# Patient Record
Sex: FEMALE | Race: WHITE | NOT HISPANIC OR LATINO | Employment: UNEMPLOYED | URBAN - METROPOLITAN AREA
[De-identification: names, ages, dates, MRNs, and addresses within clinical notes are randomized per-mention and may not be internally consistent; named-entity substitution may affect disease eponyms.]

---

## 2022-03-17 DIAGNOSIS — M25.561 RIGHT KNEE PAIN, UNSPECIFIED CHRONICITY: Primary | ICD-10-CM

## 2022-03-29 ENCOUNTER — HOSPITAL ENCOUNTER (OUTPATIENT)
Dept: RADIOLOGY | Facility: HOSPITAL | Age: 52
Discharge: HOME | End: 2022-03-29
Attending: ORTHOPAEDIC SURGERY
Payer: COMMERCIAL

## 2022-03-29 DIAGNOSIS — M25.561 RIGHT KNEE PAIN, UNSPECIFIED CHRONICITY: ICD-10-CM

## 2022-03-29 PROCEDURE — 73564 XR KNEE COMP 4 OR MORE VIEWS RIGHT: ICD-10-PCS | Mod: 26,RT,, | Performed by: RADIOLOGY

## 2022-03-29 PROCEDURE — 73564 X-RAY EXAM KNEE 4 OR MORE: CPT | Mod: 26,RT,, | Performed by: RADIOLOGY

## 2022-03-29 PROCEDURE — 73564 X-RAY EXAM KNEE 4 OR MORE: CPT | Mod: TC,FY,RT

## 2022-04-05 ENCOUNTER — OFFICE VISIT (OUTPATIENT)
Dept: ORTHOPEDICS | Facility: CLINIC | Age: 52
End: 2022-04-05
Payer: COMMERCIAL

## 2022-04-05 ENCOUNTER — LAB VISIT (OUTPATIENT)
Dept: LAB | Facility: HOSPITAL | Age: 52
End: 2022-04-05
Attending: ORTHOPAEDIC SURGERY
Payer: COMMERCIAL

## 2022-04-05 VITALS
WEIGHT: 168.88 LBS | DIASTOLIC BLOOD PRESSURE: 71 MMHG | BODY MASS INDEX: 24.18 KG/M2 | SYSTOLIC BLOOD PRESSURE: 117 MMHG | HEIGHT: 70 IN | HEART RATE: 77 BPM

## 2022-04-05 DIAGNOSIS — M12.561 TRAUMATIC ARTHRITIS OF RIGHT KNEE: ICD-10-CM

## 2022-04-05 DIAGNOSIS — T84.89XA ACL GRAFT TEAR, INITIAL ENCOUNTER: ICD-10-CM

## 2022-04-05 DIAGNOSIS — T84.89XA ACL GRAFT TEAR, INITIAL ENCOUNTER: Primary | ICD-10-CM

## 2022-04-05 LAB
BASOPHILS # BLD AUTO: 0.04 K/UL (ref 0–0.2)
BASOPHILS NFR BLD: 0.8 % (ref 0–1.9)
CRP SERPL-MCNC: 0.5 MG/L (ref 0–8.2)
DIFFERENTIAL METHOD: ABNORMAL
EOSINOPHIL # BLD AUTO: 0.3 K/UL (ref 0–0.5)
EOSINOPHIL NFR BLD: 5.5 % (ref 0–8)
ERYTHROCYTE [DISTWIDTH] IN BLOOD BY AUTOMATED COUNT: 14.3 % (ref 11.5–14.5)
HCT VFR BLD AUTO: 37.6 % (ref 37–48.5)
HGB BLD-MCNC: 12.6 G/DL (ref 12–16)
IMM GRANULOCYTES # BLD AUTO: 0.01 K/UL (ref 0–0.04)
IMM GRANULOCYTES NFR BLD AUTO: 0.2 % (ref 0–0.5)
LYMPHOCYTES # BLD AUTO: 1.3 K/UL (ref 1–4.8)
LYMPHOCYTES NFR BLD: 25.7 % (ref 18–48)
MCH RBC QN AUTO: 32 PG (ref 27–31)
MCHC RBC AUTO-ENTMCNC: 33.5 G/DL (ref 32–36)
MCV RBC AUTO: 95 FL (ref 82–98)
MONOCYTES # BLD AUTO: 0.5 K/UL (ref 0.3–1)
MONOCYTES NFR BLD: 10 % (ref 4–15)
NEUTROPHILS # BLD AUTO: 2.8 K/UL (ref 1.8–7.7)
NEUTROPHILS NFR BLD: 57.8 % (ref 38–73)
NRBC BLD-RTO: 0 /100 WBC
PLATELET # BLD AUTO: 196 K/UL (ref 150–450)
PMV BLD AUTO: 9.4 FL (ref 9.2–12.9)
RBC # BLD AUTO: 3.94 M/UL (ref 4–5.4)
WBC # BLD AUTO: 4.9 K/UL (ref 3.9–12.7)

## 2022-04-05 PROCEDURE — 3008F PR BODY MASS INDEX (BMI) DOCUMENTED: ICD-10-PCS | Mod: CPTII,S$GLB,, | Performed by: ORTHOPAEDIC SURGERY

## 2022-04-05 PROCEDURE — 1160F RVW MEDS BY RX/DR IN RCRD: CPT | Mod: CPTII,S$GLB,, | Performed by: ORTHOPAEDIC SURGERY

## 2022-04-05 PROCEDURE — 3074F SYST BP LT 130 MM HG: CPT | Mod: CPTII,S$GLB,, | Performed by: ORTHOPAEDIC SURGERY

## 2022-04-05 PROCEDURE — 1160F PR REVIEW ALL MEDS BY PRESCRIBER/CLIN PHARMACIST DOCUMENTED: ICD-10-PCS | Mod: CPTII,S$GLB,, | Performed by: ORTHOPAEDIC SURGERY

## 2022-04-05 PROCEDURE — 1159F MED LIST DOCD IN RCRD: CPT | Mod: CPTII,S$GLB,, | Performed by: ORTHOPAEDIC SURGERY

## 2022-04-05 PROCEDURE — 3078F DIAST BP <80 MM HG: CPT | Mod: CPTII,S$GLB,, | Performed by: ORTHOPAEDIC SURGERY

## 2022-04-05 PROCEDURE — 99999 PR PBB SHADOW E&M-EST. PATIENT-LVL III: ICD-10-PCS | Mod: PBBFAC,,, | Performed by: ORTHOPAEDIC SURGERY

## 2022-04-05 PROCEDURE — 3008F BODY MASS INDEX DOCD: CPT | Mod: CPTII,S$GLB,, | Performed by: ORTHOPAEDIC SURGERY

## 2022-04-05 PROCEDURE — 1159F PR MEDICATION LIST DOCUMENTED IN MEDICAL RECORD: ICD-10-PCS | Mod: CPTII,S$GLB,, | Performed by: ORTHOPAEDIC SURGERY

## 2022-04-05 PROCEDURE — 36415 COLL VENOUS BLD VENIPUNCTURE: CPT | Performed by: ORTHOPAEDIC SURGERY

## 2022-04-05 PROCEDURE — 86140 C-REACTIVE PROTEIN: CPT | Performed by: ORTHOPAEDIC SURGERY

## 2022-04-05 PROCEDURE — 85652 RBC SED RATE AUTOMATED: CPT | Performed by: ORTHOPAEDIC SURGERY

## 2022-04-05 PROCEDURE — 85025 COMPLETE CBC W/AUTO DIFF WBC: CPT | Performed by: ORTHOPAEDIC SURGERY

## 2022-04-05 PROCEDURE — 99999 PR PBB SHADOW E&M-EST. PATIENT-LVL III: CPT | Mod: PBBFAC,,, | Performed by: ORTHOPAEDIC SURGERY

## 2022-04-05 PROCEDURE — 99204 OFFICE O/P NEW MOD 45 MIN: CPT | Mod: S$GLB,,, | Performed by: ORTHOPAEDIC SURGERY

## 2022-04-05 PROCEDURE — 3074F PR MOST RECENT SYSTOLIC BLOOD PRESSURE < 130 MM HG: ICD-10-PCS | Mod: CPTII,S$GLB,, | Performed by: ORTHOPAEDIC SURGERY

## 2022-04-05 PROCEDURE — 99204 PR OFFICE/OUTPT VISIT, NEW, LEVL IV, 45-59 MIN: ICD-10-PCS | Mod: S$GLB,,, | Performed by: ORTHOPAEDIC SURGERY

## 2022-04-05 PROCEDURE — 3078F PR MOST RECENT DIASTOLIC BLOOD PRESSURE < 80 MM HG: ICD-10-PCS | Mod: CPTII,S$GLB,, | Performed by: ORTHOPAEDIC SURGERY

## 2022-04-05 RX ORDER — SERTRALINE HYDROCHLORIDE 50 MG/1
50 TABLET, FILM COATED ORAL DAILY
COMMUNITY
Start: 2022-03-17 | End: 2023-07-06 | Stop reason: SDUPTHER

## 2022-04-05 RX ORDER — MELOXICAM 7.5 MG/1
7.5-15 TABLET ORAL DAILY PRN
COMMUNITY
Start: 2022-02-16 | End: 2022-09-27

## 2022-04-05 RX ORDER — MOMETASONE FUROATE 1 MG/ML
SOLUTION TOPICAL 2 TIMES DAILY
COMMUNITY
Start: 2022-03-29

## 2022-04-05 NOTE — PROGRESS NOTES
"Patient ID:   Liza Byrne is a 51 y.o. female.    Chief Complaint:   Right knee pain and instability    HPI:   Mrs. Byrne is a pleasant 51 year old female with a history of undergoing right ACL reconstructive surgery with allograft 20 years ago. The injury occurred secondary to a basketball injury. She did well for a number of years until about four years ago. She tripped and fell and felt like she ruptured something in her knee. She tried physical therapy but did not progress well. She reports repeated episodes of the knee giving way, swelling, and pain. She currently wears a brace.     Medications:    Current Outpatient Medications:     meloxicam (MOBIC) 7.5 MG tablet, Take 7.5-15 mg by mouth daily as needed., Disp: , Rfl:     mometasone (ELOCON) 0.1 % solution, Apply topically 2 (two) times daily., Disp: , Rfl:     sertraline (ZOLOFT) 50 MG tablet, Take 50 mg by mouth once daily., Disp: , Rfl:     Allergies:  Review of patient's allergies indicates:  No Known Allergies    Past Medical History:  History reviewed. No pertinent past medical history.     Past Surgical History:  History reviewed. No pertinent surgical history.    Social History:  Social History     Occupational History    Not on file   Tobacco Use    Smoking status: Former Smoker    Smokeless tobacco: Never Used   Substance and Sexual Activity    Alcohol use: Yes    Drug use: Never    Sexual activity: Yes       Family History:  History reviewed. No pertinent family history.     ROS:  Review of Systems   Musculoskeletal: Positive for joint pain, joint swelling and myalgias.   Neurological: Negative for numbness and paresthesias.   All other systems reviewed and are negative.      Vitals:  /71 (BP Location: Left arm, Patient Position: Sitting, BP Method: Large (Automatic))   Pulse 77   Ht 5' 10" (1.778 m)   Wt 76.6 kg (168 lb 14 oz)   BMI 24.23 kg/m²     Physical Examination:  Comprehensive Orthopaedic Musculoskeletal Exam    General    "     Constitutional: appears stated age, well-developed and well-nourished    Scleral icterus: no    Labored breathing: no    Psychiatric: normal mood and affect and no acute distress    Neurological: alert and oriented x3    Skin: intact    Lymphadenopathy: none     Ortho Exam   Right knee exam:  Small vertical scar just medial to the tibia tubercle.   Positive 1+ effusion.   ROM on the right 0-135; left is +5 to 140  Lachmans 3B. 3+ pivot shift.   No medial or lateral laxity in full extension or 20 degrees.   Negative Dial test.   Tenderness along the medial and lateral joint lines. Positive McMurrays.     Imaging:  I have independently reviewed the following imaging studies performed at [facility]:    X-Ray Knee Complete 4 Or More Views Right  Narrative: EXAMINATION:  XR KNEE COMP 4 OR MORE VIEWS RIGHT    CLINICAL HISTORY:  Pain in right knee    TECHNIQUE:  Right knee radiographs, four views    COMPARISON:  None    FINDINGS:  Postsurgical changes of ACL reconstruction.  The tibial tunnel appears widened, measuring up to 2.0 cm.  No fracture or dislocation.  Moderate tibiofemoral cartilage space loss with osteophyte production noted.  Small joint effusion noted.  Impression: As above.    Electronically signed by: Dwight Lerner MD  Date:    03/29/2022  Time:    16:41    Assessment:  1. ACL graft tear, initial encounter    2. Traumatic arthritis of right knee      Plan:  I have reviewed the clinical and XR findings with Mrs. Byren. She has a very unstable knee. I have recommended further work-up to include infection panel (CBC, ESR, CRP), CT scan to evaluate the extent of the bony osteolysis within her old tunnels, and a MRI to evaluate the menisci, graft, and cartilage.     She will return to discuss further management after the studies have been completed.     Orders Placed This Encounter    CT Knee Without Contrast Right    MRI Knee Without Contrast Right    CBC Auto Differential    Sedimentation rate     C-reactive protein     No follow-ups on file.

## 2022-04-06 LAB — ERYTHROCYTE [SEDIMENTATION RATE] IN BLOOD BY WESTERGREN METHOD: 10 MM/HR (ref 0–20)

## 2022-04-11 ENCOUNTER — HOSPITAL ENCOUNTER (OUTPATIENT)
Dept: RADIOLOGY | Facility: HOSPITAL | Age: 52
Discharge: HOME | End: 2022-04-11
Attending: ORTHOPAEDIC SURGERY
Payer: COMMERCIAL

## 2022-04-11 DIAGNOSIS — T84.89XA ACL GRAFT TEAR, INITIAL ENCOUNTER: ICD-10-CM

## 2022-04-11 PROCEDURE — 73700 CT KNEE WITHOUT CONTRAST RIGHT: ICD-10-PCS | Mod: 26,RT,, | Performed by: RADIOLOGY

## 2022-04-11 PROCEDURE — 73700 CT LOWER EXTREMITY W/O DYE: CPT | Mod: TC,RT

## 2022-04-11 PROCEDURE — 73700 CT LOWER EXTREMITY W/O DYE: CPT | Mod: 26,RT,, | Performed by: RADIOLOGY

## 2022-04-28 ENCOUNTER — HOSPITAL ENCOUNTER (OUTPATIENT)
Dept: RADIOLOGY | Facility: OTHER | Age: 52
Discharge: HOME | End: 2022-04-28
Attending: ORTHOPAEDIC SURGERY
Payer: COMMERCIAL

## 2022-04-28 DIAGNOSIS — T84.89XA ACL GRAFT TEAR, INITIAL ENCOUNTER: ICD-10-CM

## 2022-04-28 PROCEDURE — 73721 MRI JNT OF LWR EXTRE W/O DYE: CPT | Mod: 26,RT,, | Performed by: RADIOLOGY

## 2022-04-28 PROCEDURE — 73721 MRI JNT OF LWR EXTRE W/O DYE: CPT | Mod: TC,RT

## 2022-04-28 PROCEDURE — 73721 MRI KNEE WITHOUT CONTRAST RIGHT: ICD-10-PCS | Mod: 26,RT,, | Performed by: RADIOLOGY

## 2022-05-03 ENCOUNTER — TELEPHONE (OUTPATIENT)
Dept: ORTHOPEDICS | Facility: CLINIC | Age: 52
End: 2022-05-03
Payer: COMMERCIAL

## 2022-05-03 NOTE — TELEPHONE ENCOUNTER
----- Message from Josiah Butler MD sent at 5/3/2022  4:01 PM CDT -----  Regarding: Follow-Up Appt  I would like for the patient to return to review MRI findings

## 2022-05-13 ENCOUNTER — OFFICE VISIT (OUTPATIENT)
Dept: ORTHOPEDICS | Facility: CLINIC | Age: 52
End: 2022-05-13
Payer: COMMERCIAL

## 2022-05-13 VITALS
BODY MASS INDEX: 23.56 KG/M2 | SYSTOLIC BLOOD PRESSURE: 130 MMHG | HEART RATE: 80 BPM | HEIGHT: 70 IN | WEIGHT: 164.56 LBS | DIASTOLIC BLOOD PRESSURE: 86 MMHG

## 2022-05-13 DIAGNOSIS — T84.89XA ACL GRAFT TEAR, INITIAL ENCOUNTER: Primary | ICD-10-CM

## 2022-05-13 DIAGNOSIS — M23.203 OLD COMPLEX TEAR OF MEDIAL MENISCUS OF RIGHT KNEE: ICD-10-CM

## 2022-05-13 DIAGNOSIS — M23.200 OLD COMPLEX TEAR OF LATERAL MENISCUS OF RIGHT KNEE: ICD-10-CM

## 2022-05-13 DIAGNOSIS — M12.561 TRAUMATIC ARTHRITIS OF RIGHT KNEE: ICD-10-CM

## 2022-05-13 PROCEDURE — 99214 PR OFFICE/OUTPT VISIT, EST, LEVL IV, 30-39 MIN: ICD-10-PCS | Mod: S$GLB,,, | Performed by: ORTHOPAEDIC SURGERY

## 2022-05-13 PROCEDURE — 3008F PR BODY MASS INDEX (BMI) DOCUMENTED: ICD-10-PCS | Mod: CPTII,S$GLB,, | Performed by: ORTHOPAEDIC SURGERY

## 2022-05-13 PROCEDURE — 1159F PR MEDICATION LIST DOCUMENTED IN MEDICAL RECORD: ICD-10-PCS | Mod: CPTII,S$GLB,, | Performed by: ORTHOPAEDIC SURGERY

## 2022-05-13 PROCEDURE — 1159F MED LIST DOCD IN RCRD: CPT | Mod: CPTII,S$GLB,, | Performed by: ORTHOPAEDIC SURGERY

## 2022-05-13 PROCEDURE — 3075F PR MOST RECENT SYSTOLIC BLOOD PRESS GE 130-139MM HG: ICD-10-PCS | Mod: CPTII,S$GLB,, | Performed by: ORTHOPAEDIC SURGERY

## 2022-05-13 PROCEDURE — 3079F DIAST BP 80-89 MM HG: CPT | Mod: CPTII,S$GLB,, | Performed by: ORTHOPAEDIC SURGERY

## 2022-05-13 PROCEDURE — 99214 OFFICE O/P EST MOD 30 MIN: CPT | Mod: S$GLB,,, | Performed by: ORTHOPAEDIC SURGERY

## 2022-05-13 PROCEDURE — 3008F BODY MASS INDEX DOCD: CPT | Mod: CPTII,S$GLB,, | Performed by: ORTHOPAEDIC SURGERY

## 2022-05-13 PROCEDURE — 3079F PR MOST RECENT DIASTOLIC BLOOD PRESSURE 80-89 MM HG: ICD-10-PCS | Mod: CPTII,S$GLB,, | Performed by: ORTHOPAEDIC SURGERY

## 2022-05-13 PROCEDURE — 3075F SYST BP GE 130 - 139MM HG: CPT | Mod: CPTII,S$GLB,, | Performed by: ORTHOPAEDIC SURGERY

## 2022-05-13 PROCEDURE — 99999 PR PBB SHADOW E&M-EST. PATIENT-LVL III: ICD-10-PCS | Mod: PBBFAC,,, | Performed by: ORTHOPAEDIC SURGERY

## 2022-05-13 PROCEDURE — 99999 PR PBB SHADOW E&M-EST. PATIENT-LVL III: CPT | Mod: PBBFAC,,, | Performed by: ORTHOPAEDIC SURGERY

## 2022-05-13 NOTE — PROGRESS NOTES
Patient ID:   Liza Byrne is a 51 y.o. female.    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 Byrne is returning for evaluation of the right knee.     Medications:    Current Outpatient Medications:     meloxicam (MOBIC) 7.5 MG tablet, Take 7.5-15 mg by mouth daily as needed., Disp: , Rfl:     mometasone (ELOCON) 0.1 % solution, Apply topically 2 (two) times daily., Disp: , Rfl:     sertraline (ZOLOFT) 50 MG tablet, Take 50 mg by mouth once daily., Disp: , Rfl:     Allergies:  Review of patient's allergies indicates:  No Known Allergies    Past Medical History:  No past medical history on file.     Past Surgical History:  No past surgical history on file.    Social History:  Social History     Occupational History    Not on file   Tobacco Use    Smoking status: Former Smoker    Smokeless tobacco: Never Used   Substance and Sexual Activity    Alcohol use: Yes    Drug use: Never    Sexual activity: Yes       Family History:  No family history on file.     ROS:  Review of Systems   Musculoskeletal: Positive for arthritis, joint pain, joint swelling and myalgias.   Neurological: Negative for numbness and paresthesias.   All other systems reviewed and are negative.      Vitals:  There were no vitals taken for this visit.    Physical Examination:  Comprehensive Orthopaedic Musculoskeletal Exam    General        Constitutional: appears stated age, well-developed and well-nourished    Scleral icterus: no    Labored breathing: no    Psychiatric: normal mood and affect and no acute distress    Neurological: alert and oriented x3    Skin: intact    Lymphadenopathy: none     Ortho Exam     Right knee exam:  Small vertical scar just medial to the tibia tubercle.   Positive 1+ effusion.   ROM on the right 0-135; left is +5 to 140  Lachmans 3B. 3+ pivot shift.   No medial or lateral laxity in full extension or 20 degrees.   Negative Dial test.    Tenderness along the medial and lateral joint lines. Positive McMurrays.     Imaging:    I have independently reviewed the following imaging studies performed at [facility]:    MRI Knee Without Contrast Right  Narrative: EXAMINATION:  MRI KNEE WITHOUT CONTRAST RIGHT    CLINICAL HISTORY:  Knee trauma, internal derangement suspected, xray done;    TECHNIQUE:  Routine MRI evaluation of the right knee without contrast using the following sequences: Coronal PDFS, PD; sagittal T2FS, T1; axial PDFS.    COMPARISON:  CT 04/11/2022.  Radiograph 03/29/2022.    FINDINGS:  Menisci: Suspected postoperative change of partial medial meniscectomy noting a diminutive caliber of the body segment and posterior horn.  Probable complex retear of the body segment noting a displaced flap into the superior recess of the medial gutter.    The lateral meniscus demonstrates the following abnormalities:    *Abnormal morphology and signal intensity of the posterior horn concerning for chronic tear with superimposed granulation or scar tissue.  *Abnormal morphology of the and signal intensity of the anterior root ligament concerning for chronic complex tear.  Ligaments: Postoperative changes of ACL reconstruction.  There is nonvisualization of the ACL graft in keeping with a chronic tear.  There is heterogeneous signal intensity within the tibial and femoral tunnels likely related to complex cystic expansion, better evaluated on previous CT.  PCL, MCL and posterolateral corner structures are intact.    Extensor Mechanism: Patellofemoral alignment is maintained.  Quadriceps and patellar tendons are intact.  MPFL and medial/lateral retinacula are normal.    Cartilage:    *Patellofemoral: There is partial to full-thickness chondral fissuring overlying the patellar eminence, medial patellar facet, lateral patellar facet and medial and central trochlea.  No subchondral edema.  *Medial tibiofemoral: There is partial-thickness chondral fissuring  overlying the medial aspect of the central and posterior weight-bearing portions of the femoral condyle.  Tibial cartilage appears grossly preserved.  *Lateral tibiofemoral: There is superficial chondral fissuring overlying the posterior weight-bearing portion of the tibial plateau.  Femoral cartilage appears preserved.  No subchondral edema.  Bones: There are small tricompartmental marginal osteophytes.  No fractures.  No avascular necrosis.  No marrow infiltrative process.    Miscellaneous: There is arthrofibrosis within Hoffa's fat pad.  There is a small joint effusion with mild synovial thickening.  No popliteal cyst.  Distal iliotibial band is normal.  Medial gastrocnemius, lateral gastrocnemius, distal semimembranosus and visualized pes anserine tendons appear unremarkable.  Distal iliotibial band is normal.  Neurovascular structures appear unremarkable.  Impression: 1. Chronic tear of the ACL graft and complex cystic expansion of the femoral and tibial tunnels, better evaluated on previous CT.  2. Postoperative changes of partial medial meniscectomy with probable complex retear of the body segment noting a displaced flap into the superior recess of the medial gutter.  3. Chronic tears of the anterior horn/anterior root ligament and posterior horn of the lateral meniscus.  4. Tricompartmental chondromalacia as detailed above.  5. Small joint effusion with associated synovitis.    Electronically signed by: Aleksandr Ibanez MD  Date:    04/28/2022  Time:    16:56    EXAMINATION:  CT KNEE WITHOUT CONTRAST RIGHT     CLINICAL HISTORY:  Bone lesion, knee, incidental;Osteolysis after remote ACL reconstructive surgery 20 years ago;  Other specified complication of internal orthopedic prosthetic devices, implants and grafts, initial encounter.     TECHNIQUE:  0.625 mm axial images of the knee (right) joint obtained, coronal and sagittal sagittal images reformatted, 3D reconstruction  generated.     COMPARISON:  None     FINDINGS:  Prior history of ACL repair, the tibial tunnel is widened measuring 21 x 20 mm in its largest portion.     The femoral tunnel is also heterogeneous and enlarged measuring 21 x 19 mm.     The tunnels demonstrate fatty tissue and soft tissue component within.  I suspect a ACL graft tear.  There is significant calcification of the anterior horn of the lateral meniscus.     There is a small joint effusion.     The subcutaneous soft tissue and musculature appear normal.     Impression:     Empty and enlarged appearing tibial and femoral tunnels from prior ACL repair.  The ACL graft is probably torn.     Small joint effusion.        Electronically signed by: Rossy Espino MD  Date:                                            04/11/2022  Time:                                           16:11    Assessment:  1. ACL graft tear, initial encounter    2. Traumatic arthritis of right knee    3. Old complex tear of medial meniscus of right knee    4. Old complex tear of lateral meniscus of right knee      Plan:  I have reviewed the imaging studies with the patient in detail. I reviewed the surgical option of right knee diagnostic arthroscopy with meniscal treatment as indicated, bone grafting of the old expanded reconstruction tunnels, and revision ACL reconstruction with allograft. The risks, benefits, and alternatives were reviewed.      No follow-ups on file.

## 2022-05-19 DIAGNOSIS — M23.200 OLD COMPLEX TEAR OF LATERAL MENISCUS OF RIGHT KNEE: ICD-10-CM

## 2022-05-19 DIAGNOSIS — S83.511A RIGHT ACL TEAR: ICD-10-CM

## 2022-05-19 DIAGNOSIS — M23.203 OLD COMPLEX TEAR OF MEDIAL MENISCUS OF RIGHT KNEE: ICD-10-CM

## 2022-05-19 DIAGNOSIS — Z01.818 PRE-OP TESTING: ICD-10-CM

## 2022-05-19 DIAGNOSIS — T84.89XA ACL GRAFT TEAR, INITIAL ENCOUNTER: Primary | ICD-10-CM

## 2022-05-19 RX ORDER — CEFAZOLIN SODIUM 2 G/50ML
2 SOLUTION INTRAVENOUS
Status: CANCELLED | OUTPATIENT
Start: 2022-05-19

## 2022-05-19 RX ORDER — SODIUM CHLORIDE 9 MG/ML
INJECTION, SOLUTION INTRAVENOUS CONTINUOUS
Status: CANCELLED | OUTPATIENT
Start: 2022-05-19

## 2022-06-01 ENCOUNTER — TELEPHONE (OUTPATIENT)
Dept: ORTHOPEDICS | Facility: CLINIC | Age: 52
End: 2022-06-01
Payer: COMMERCIAL

## 2022-06-01 NOTE — TELEPHONE ENCOUNTER
----- Message from Damaris Post sent at 6/1/2022  9:51 AM CDT -----  Regarding: call back  Contact: [phone]  Who Called: PT     Patient is calling to talk to nurse in regards to getting her procedure arrival time and any directions on what to do before the procedure.

## 2022-06-02 ENCOUNTER — TELEPHONE (OUTPATIENT)
Dept: ORTHOPEDICS | Facility: CLINIC | Age: 52
End: 2022-06-02

## 2022-06-02 ENCOUNTER — ANESTHESIA EVENT (OUTPATIENT)
Dept: SURGERY | Facility: HOSPITAL | Age: 52
End: 2022-06-02
Payer: COMMERCIAL

## 2022-06-02 ENCOUNTER — ANESTHESIA (OUTPATIENT)
Dept: SURGERY | Facility: HOSPITAL | Age: 52
End: 2022-06-02
Payer: COMMERCIAL

## 2022-06-02 ENCOUNTER — HOSPITAL ENCOUNTER (OUTPATIENT)
Facility: HOSPITAL | Age: 52
Discharge: HOME | End: 2022-06-02
Attending: ORTHOPAEDIC SURGERY | Admitting: ORTHOPAEDIC SURGERY
Payer: COMMERCIAL

## 2022-06-02 VITALS
BODY MASS INDEX: 24.05 KG/M2 | WEIGHT: 168 LBS | HEIGHT: 70 IN | HEART RATE: 56 BPM | DIASTOLIC BLOOD PRESSURE: 70 MMHG | OXYGEN SATURATION: 97 % | TEMPERATURE: 98 F | SYSTOLIC BLOOD PRESSURE: 128 MMHG | RESPIRATION RATE: 16 BRPM

## 2022-06-02 DIAGNOSIS — S83.511A RIGHT ACL TEAR: ICD-10-CM

## 2022-06-02 DIAGNOSIS — M23.200 OLD COMPLEX TEAR OF LATERAL MENISCUS OF RIGHT KNEE: ICD-10-CM

## 2022-06-02 DIAGNOSIS — M89.50 OSTEOLYSIS AFTER SURGICAL PROCEDURE ON SKELETAL SYSTEM: ICD-10-CM

## 2022-06-02 DIAGNOSIS — T84.89XA ACL GRAFT TEAR, INITIAL ENCOUNTER: ICD-10-CM

## 2022-06-02 DIAGNOSIS — M23.203 OLD COMPLEX TEAR OF MEDIAL MENISCUS OF RIGHT KNEE: ICD-10-CM

## 2022-06-02 DIAGNOSIS — M96.89 OSTEOLYSIS AFTER SURGICAL PROCEDURE ON SKELETAL SYSTEM: ICD-10-CM

## 2022-06-02 PROCEDURE — 25000003 PHARM REV CODE 250: Performed by: NURSE ANESTHETIST, CERTIFIED REGISTERED

## 2022-06-02 PROCEDURE — 27800903 OPTIME MED/SURG SUP & DEVICES OTHER IMPLANTS: Performed by: ORTHOPAEDIC SURGERY

## 2022-06-02 PROCEDURE — 27415 OSTEOCHONDRAL KNEE ALLOGRAFT: CPT | Mod: RT,,, | Performed by: ORTHOPAEDIC SURGERY

## 2022-06-02 PROCEDURE — 97116 GAIT TRAINING THERAPY: CPT

## 2022-06-02 PROCEDURE — 63600175 PHARM REV CODE 636 W HCPCS: Performed by: NURSE ANESTHETIST, CERTIFIED REGISTERED

## 2022-06-02 PROCEDURE — 63600175 PHARM REV CODE 636 W HCPCS: Performed by: ANESTHESIOLOGY

## 2022-06-02 PROCEDURE — 27415 PR OSTEOCHONDRAL KNEE ALLOGRAFT: ICD-10-PCS | Mod: RT,,, | Performed by: ORTHOPAEDIC SURGERY

## 2022-06-02 PROCEDURE — 37000008 HC ANESTHESIA 1ST 15 MINUTES: Performed by: ORTHOPAEDIC SURGERY

## 2022-06-02 PROCEDURE — 63600175 PHARM REV CODE 636 W HCPCS: Performed by: ORTHOPAEDIC SURGERY

## 2022-06-02 PROCEDURE — 71000033 HC RECOVERY, INTIAL HOUR: Performed by: ORTHOPAEDIC SURGERY

## 2022-06-02 PROCEDURE — 36000710: Performed by: ORTHOPAEDIC SURGERY

## 2022-06-02 PROCEDURE — 29880 PR KNEE SCOPE MED/LAT MENISCECTOMY: ICD-10-PCS | Mod: 51,RT,, | Performed by: ORTHOPAEDIC SURGERY

## 2022-06-02 PROCEDURE — 97161 PT EVAL LOW COMPLEX 20 MIN: CPT

## 2022-06-02 PROCEDURE — 71000015 HC POSTOP RECOV 1ST HR: Performed by: ORTHOPAEDIC SURGERY

## 2022-06-02 PROCEDURE — C1713 ANCHOR/SCREW BN/BN,TIS/BN: HCPCS | Performed by: ORTHOPAEDIC SURGERY

## 2022-06-02 PROCEDURE — 71000016 HC POSTOP RECOV ADDL HR: Performed by: ORTHOPAEDIC SURGERY

## 2022-06-02 PROCEDURE — 76942 ECHO GUIDE FOR BIOPSY: CPT | Performed by: STUDENT IN AN ORGANIZED HEALTH CARE EDUCATION/TRAINING PROGRAM

## 2022-06-02 PROCEDURE — 37000009 HC ANESTHESIA EA ADD 15 MINS: Performed by: ORTHOPAEDIC SURGERY

## 2022-06-02 PROCEDURE — 97530 THERAPEUTIC ACTIVITIES: CPT

## 2022-06-02 PROCEDURE — 27201423 OPTIME MED/SURG SUP & DEVICES STERILE SUPPLY: Performed by: ORTHOPAEDIC SURGERY

## 2022-06-02 PROCEDURE — 36000711: Performed by: ORTHOPAEDIC SURGERY

## 2022-06-02 PROCEDURE — 29880 ARTHRS KNE SRG MNISECTMY M&L: CPT | Mod: 51,RT,, | Performed by: ORTHOPAEDIC SURGERY

## 2022-06-02 DEVICE — FIBER CORTICAL ENHANCE 2.5CC: Type: IMPLANTABLE DEVICE | Site: KNEE | Status: FUNCTIONAL

## 2022-06-02 DEVICE — IMPLANTABLE DEVICE: Type: IMPLANTABLE DEVICE | Site: KNEE | Status: FUNCTIONAL

## 2022-06-02 DEVICE — PIN GUIDE GRAFT PASS XACTPIN: Type: IMPLANTABLE DEVICE | Site: KNEE | Status: FUNCTIONAL

## 2022-06-02 RX ORDER — HYDROMORPHONE HYDROCHLORIDE 2 MG/ML
0.5 INJECTION, SOLUTION INTRAMUSCULAR; INTRAVENOUS; SUBCUTANEOUS EVERY 5 MIN PRN
Status: DISCONTINUED | OUTPATIENT
Start: 2022-06-02 | End: 2022-06-02 | Stop reason: HOSPADM

## 2022-06-02 RX ORDER — ONDANSETRON 2 MG/ML
4 INJECTION INTRAMUSCULAR; INTRAVENOUS DAILY PRN
Status: DISCONTINUED | OUTPATIENT
Start: 2022-06-02 | End: 2022-06-02 | Stop reason: HOSPADM

## 2022-06-02 RX ORDER — PROPOFOL 10 MG/ML
VIAL (ML) INTRAVENOUS
Status: DISCONTINUED | OUTPATIENT
Start: 2022-06-02 | End: 2022-06-02

## 2022-06-02 RX ORDER — DEXAMETHASONE SODIUM PHOSPHATE 4 MG/ML
INJECTION, SOLUTION INTRA-ARTICULAR; INTRALESIONAL; INTRAMUSCULAR; INTRAVENOUS; SOFT TISSUE
Status: DISCONTINUED | OUTPATIENT
Start: 2022-06-02 | End: 2022-06-02

## 2022-06-02 RX ORDER — EPHEDRINE SULFATE 50 MG/ML
INJECTION, SOLUTION INTRAVENOUS
Status: DISCONTINUED | OUTPATIENT
Start: 2022-06-02 | End: 2022-06-02

## 2022-06-02 RX ORDER — CEPHALEXIN 500 MG/1
500 CAPSULE ORAL EVERY 12 HOURS
Qty: 2 CAPSULE | Refills: 0 | Status: SHIPPED | OUTPATIENT
Start: 2022-06-02 | End: 2022-06-03

## 2022-06-02 RX ORDER — FENTANYL CITRATE 50 UG/ML
INJECTION, SOLUTION INTRAMUSCULAR; INTRAVENOUS
Status: DISCONTINUED | OUTPATIENT
Start: 2022-06-02 | End: 2022-06-02

## 2022-06-02 RX ORDER — SODIUM CHLORIDE 0.9 % (FLUSH) 0.9 %
3 SYRINGE (ML) INJECTION
Status: DISCONTINUED | OUTPATIENT
Start: 2022-06-02 | End: 2022-06-02 | Stop reason: HOSPADM

## 2022-06-02 RX ORDER — ONDANSETRON HYDROCHLORIDE 8 MG/1
8 TABLET, FILM COATED ORAL EVERY 12 HOURS PRN
Qty: 24 TABLET | Refills: 0 | Status: SHIPPED | OUTPATIENT
Start: 2022-06-02 | End: 2022-09-13

## 2022-06-02 RX ORDER — OXYCODONE AND ACETAMINOPHEN 10; 325 MG/1; MG/1
1 TABLET ORAL EVERY 6 HOURS PRN
Qty: 28 TABLET | Refills: 0 | Status: SHIPPED | OUTPATIENT
Start: 2022-06-02 | End: 2022-09-13

## 2022-06-02 RX ORDER — ROPIVACAINE HYDROCHLORIDE 2 MG/ML
INJECTION, SOLUTION EPIDURAL; INFILTRATION; PERINEURAL
Status: DISCONTINUED | OUTPATIENT
Start: 2022-06-02 | End: 2022-06-02

## 2022-06-02 RX ORDER — ONDANSETRON 2 MG/ML
INJECTION INTRAMUSCULAR; INTRAVENOUS
Status: DISCONTINUED | OUTPATIENT
Start: 2022-06-02 | End: 2022-06-02

## 2022-06-02 RX ORDER — SODIUM CHLORIDE 9 MG/ML
INJECTION, SOLUTION INTRAVENOUS CONTINUOUS
Status: DISCONTINUED | OUTPATIENT
Start: 2022-06-02 | End: 2022-06-02 | Stop reason: HOSPADM

## 2022-06-02 RX ORDER — MIDAZOLAM HYDROCHLORIDE 1 MG/ML
INJECTION, SOLUTION INTRAMUSCULAR; INTRAVENOUS
Status: DISCONTINUED | OUTPATIENT
Start: 2022-06-02 | End: 2022-06-02

## 2022-06-02 RX ORDER — CEFAZOLIN SODIUM 1 G/3ML
INJECTION, POWDER, FOR SOLUTION INTRAMUSCULAR; INTRAVENOUS
Status: DISCONTINUED | OUTPATIENT
Start: 2022-06-02 | End: 2022-06-02

## 2022-06-02 RX ORDER — EPINEPHRINE 1 MG/ML
INJECTION INTRAMUSCULAR; INTRAVENOUS; SUBCUTANEOUS
Status: DISCONTINUED | OUTPATIENT
Start: 2022-06-02 | End: 2022-06-02 | Stop reason: HOSPADM

## 2022-06-02 RX ORDER — CEFAZOLIN SODIUM 2 G/50ML
2 SOLUTION INTRAVENOUS
Status: DISCONTINUED | OUTPATIENT
Start: 2022-06-02 | End: 2022-06-02 | Stop reason: HOSPADM

## 2022-06-02 RX ORDER — ROCURONIUM BROMIDE 10 MG/ML
INJECTION, SOLUTION INTRAVENOUS
Status: DISCONTINUED | OUTPATIENT
Start: 2022-06-02 | End: 2022-06-02

## 2022-06-02 RX ORDER — LIDOCAINE HYDROCHLORIDE 20 MG/ML
INJECTION, SOLUTION EPIDURAL; INFILTRATION; INTRACAUDAL; PERINEURAL
Status: DISCONTINUED | OUTPATIENT
Start: 2022-06-02 | End: 2022-06-02

## 2022-06-02 RX ORDER — PHENYLEPHRINE HYDROCHLORIDE 10 MG/ML
INJECTION INTRAVENOUS
Status: DISCONTINUED | OUTPATIENT
Start: 2022-06-02 | End: 2022-06-02

## 2022-06-02 RX ORDER — DIPHENHYDRAMINE HYDROCHLORIDE 50 MG/ML
12.5 INJECTION INTRAMUSCULAR; INTRAVENOUS EVERY 6 HOURS PRN
Status: DISCONTINUED | OUTPATIENT
Start: 2022-06-02 | End: 2022-06-02 | Stop reason: HOSPADM

## 2022-06-02 RX ADMIN — ONDANSETRON 8 MG: 2 INJECTION, SOLUTION INTRAMUSCULAR; INTRAVENOUS at 11:06

## 2022-06-02 RX ADMIN — ROPIVACAINE HYDROCHLORIDE 20 ML: 2 INJECTION, SOLUTION EPIDURAL; INFILTRATION at 12:06

## 2022-06-02 RX ADMIN — PHENYLEPHRINE HYDROCHLORIDE 100 MCG: 10 INJECTION INTRAVENOUS at 10:06

## 2022-06-02 RX ADMIN — DEXAMETHASONE SODIUM PHOSPHATE 4 MG: 4 INJECTION, SOLUTION INTRA-ARTICULAR; INTRALESIONAL; INTRAMUSCULAR; INTRAVENOUS; SOFT TISSUE at 10:06

## 2022-06-02 RX ADMIN — SODIUM CHLORIDE, SODIUM LACTATE, POTASSIUM CHLORIDE, AND CALCIUM CHLORIDE: .6; .31; .03; .02 INJECTION, SOLUTION INTRAVENOUS at 10:06

## 2022-06-02 RX ADMIN — EPHEDRINE SULFATE 5 MG: 50 INJECTION, SOLUTION INTRAMUSCULAR; INTRAVENOUS; SUBCUTANEOUS at 10:06

## 2022-06-02 RX ADMIN — MIDAZOLAM 2 MG: 1 INJECTION INTRAMUSCULAR; INTRAVENOUS at 09:06

## 2022-06-02 RX ADMIN — PHENYLEPHRINE HYDROCHLORIDE 100 MCG: 10 INJECTION INTRAVENOUS at 09:06

## 2022-06-02 RX ADMIN — PHENYLEPHRINE HYDROCHLORIDE 200 MCG: 10 INJECTION INTRAVENOUS at 10:06

## 2022-06-02 RX ADMIN — CEFAZOLIN 2 G: 330 INJECTION, POWDER, FOR SOLUTION INTRAMUSCULAR; INTRAVENOUS at 09:06

## 2022-06-02 RX ADMIN — FENTANYL CITRATE 50 MCG: 50 INJECTION, SOLUTION INTRAMUSCULAR; INTRAVENOUS at 11:06

## 2022-06-02 RX ADMIN — GLYCOPYRROLATE 0.2 MG: 0.2 INJECTION, SOLUTION INTRAMUSCULAR; INTRAVITREAL at 09:06

## 2022-06-02 RX ADMIN — ROCURONIUM BROMIDE 40 MG: 10 INJECTION, SOLUTION INTRAVENOUS at 09:06

## 2022-06-02 RX ADMIN — EPHEDRINE SULFATE 10 MG: 50 INJECTION, SOLUTION INTRAMUSCULAR; INTRAVENOUS; SUBCUTANEOUS at 10:06

## 2022-06-02 RX ADMIN — PROPOFOL 160 MG: 10 INJECTION, EMULSION INTRAVENOUS at 09:06

## 2022-06-02 RX ADMIN — LIDOCAINE HYDROCHLORIDE 100 MG: 20 INJECTION, SOLUTION EPIDURAL; INFILTRATION; INTRACAUDAL; PERINEURAL at 09:06

## 2022-06-02 RX ADMIN — SODIUM CHLORIDE, SODIUM LACTATE, POTASSIUM CHLORIDE, AND CALCIUM CHLORIDE: .6; .31; .03; .02 INJECTION, SOLUTION INTRAVENOUS at 09:06

## 2022-06-02 RX ADMIN — EPHEDRINE SULFATE 10 MG: 50 INJECTION, SOLUTION INTRAMUSCULAR; INTRAVENOUS; SUBCUTANEOUS at 11:06

## 2022-06-02 RX ADMIN — FENTANYL CITRATE 125 MCG: 50 INJECTION, SOLUTION INTRAMUSCULAR; INTRAVENOUS at 09:06

## 2022-06-02 RX ADMIN — HYDROMORPHONE HYDROCHLORIDE 0.5 MG: 2 INJECTION, SOLUTION INTRAMUSCULAR; INTRAVENOUS; SUBCUTANEOUS at 12:06

## 2022-06-02 RX ADMIN — HYPROMELLOSE 2910 2 DROP: 5 SOLUTION OPHTHALMIC at 09:06

## 2022-06-02 NOTE — BRIEF OP NOTE
Right knee diagnostic arthroscopy, partial medial/lateral meniscectomy, limited chondroplasty, removal of loose bodies, femoral/tibial bone grafting Procedure Note    Liza Byrne  [id number]    Date of Procedure: 6/2/2022    Pre-op Diagnosis: R ACL Recon rupture, Med/Lateral meniscal tear        Post-op Diagnosis:   R ACL tear  Radial tear medial meniscus  Vertical longitudinal tear lateral meniscus meniscal root to lateral margin  Grade III/IV chondromalacia trochlea  Grade II/III chondromalacia Medial/Lateral compartments     Procedure(s):  1. Right knee diagnostic arthroscopy  2. Right knee medial/lateral partial meniscectomy  3. Right knee limited chondroplasty  4. Right knee removal of loose body  5. Right knee notchplasty  6. Femoral/tibial bone grafting, allograft dowels     Anesthesia: General    Surgeon(s) and Role:  Panel 1:     * Josiah Butler MD - Primary     * Lior Ortiz MD - Resident: Surgeon Not Chief     * Ernst Escalante MD - Resident: Surgeon Chief    Estimated Blood Loss: Minimal    Quantatative Blood Loss: No Data Recorded           Drain:  None            Total IV Fluids: see logs           Specimens: * No specimens in log *           Implants:  Linvatec alograft Cloward dowels           Complications:  None    Findings:     R ACL tear  Radial tear medial meniscus  Vertical longitudinal tear lateral meniscus meniscal root to lateral margin  Grade III/IV chondromalacia trochlea  Grade II/III chondromalacia Medial/Lateral compartments            Disposition: awakened from anesthesia, extubated and taken to the recovery room in a stable condition, having suffered no apparent untoward event.           Condition: doing well without problems      Technique: See operative report     Admission Condition: stable    Discharged Condition: stable    Indication for Admission: SDS    Hospital Course: Patient was admitted to same day surgery on 6/2/2022 for right knee diagnostic arthroscopy,  medial/lateral meniscectomy, femoral/tibial bone grafting. Pt underwent procedure, tolerated it well and without complication. Pt was brought to PACU and subsequently stepped down back to same day surgery. In same day, pt's pain was adequately controlled with PO pain medicine and pt met all criteria and was deemed stable for discharge. Pt was discharged to home with PO pain medicine, thorough wound care instructions, and appropriate clinic follow up and discharge instructions.     Patient Instructions:     Activity: activity as tolerated  Diet: regular diet  Wound Care: keep wound clean and dry and as directed    - - -  Ernst Escalante MD   [facility], PGY-5      I have reviewed the notes, assessments, and/or procedures performed by Dr. Butler, I concur with her/his documentation of Liza Byrne.

## 2022-06-02 NOTE — PT/OT/SLP PROGRESS
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 No pertinent past medical history.  History reviewed. No pertinent surgical history.    Subjective Information     Prior level of function: independent  Residence: lives with their family 1-story house/ trailer   Support available: family  Equipment owned: None  Mental Status: Oriented X 4 and Lethargic but became more alert throughout session  Skin: Impaired: surgical site R knee  Sensation: Impaired: numbness R foot post peripheral nerve block  Posture: Good  Hearing: Intact  Vision:  Intact    Pain at time of assessment: 3/10 located in R knee; 4/10 post ambulation     Objective findings/Assessment  Bed mobility: Supervision  Transfers: CGA-SBA  Gross assessment: WFL  Standing balance: Fair+  ROM: R knee AROM grossly 0-80 degrees, otherwise pt WFL  Strength: R LE not tested 2/2 pain and recent sx; LLE and B UE WFL  Patient requires crutch fitting and training.    Treatment  Gait: Pt ambulated ~40 ft and ~10 ft with B crutches and CGA progressed to SBA.   Stairs: PT demonstrated proper stair negotiation with crutches; pt  declined practicing a step   Transfers: Pt performed sup<>sit with SPV; sit <>stand with CGA-SBA with VC's for hand placement and sequencing; and toilet tf with CGA and use of crutches  Therapeutic Exercise: Pt educated on LE exercises such as APs, LAQs, seated marches   Education provided in form of: visual demonstration and verbal instruction; pt educated on ice/rest/elevation, WBAT RLE precautions, step to gait pattern with crutches, stair negotiation with crutches, and overall home safety     AM-PAC 6 CLICK MOBILITY  How much help from another person does this patient currently need?   1 = Unable, Total/Dependent Assistance  2 = A lot, Maximum/Moderate Assistance  3 = A little, Minimum/Contact Guard/Supervision  4 = None, Modified [place]/Independent    Turning over in bed (including adjusting bedclothes, sheets and blankets)?: 3  Sitting down on and standing up from a chair with arms (e.g., wheelchair, bedside commode, etc.): 3  Moving from lying on back to sitting on the side of the bed?: 3  Moving to and from a bed to a chair (including a wheelchair)?: 3  Need to walk in hospital room?: 3  Climbing 3-5 steps with a railing?: 3  Basic Mobility Total Score: 18     AM-PAC Raw Score CMS G-Code Modifier Level of Impairment Assistance   6 % Total / Unable   7 - 9 CM 80 - 100% Maximal Assist   10 - 14 CL 60 - 80% Moderate Assist   15 - 19 CK 40 - 60% Moderate Assist   20 - 22 CJ 20 - 40% Minimal Assist   23 CI 1-20% SBA / CGA   24 CH 0% Independent/ Mod I     Goals/Discharge Status  Patient safely and effectively ambulates with crutches with  standy by assistance,  weight bearing as tolerated: right leg on level surfaces.    Recommended Plan:  Patient to be discharged to home with family support. Recommending OP PT upon d/c once cleared by MD.     06/02/2022

## 2022-06-02 NOTE — ANESTHESIA PROCEDURE NOTES
Peripheral Block    Patient location during procedure: post-op   Block not for primary anesthetic.  Reason for block: at surgeon's request and post-op pain management   Post-op Pain Location: Right knee   Start time: 6/2/2022 12:17 PM  Timeout: 6/2/2022 12:16 PM   End time: 6/2/2022 12:20 PM    Staffing  Authorizing Provider: Kwame Frank MD  Performing Provider: Matt Elizabeth MD    Staffing  Other anesthesia staff: Mary Ellen Mcgrath MD  Preanesthetic Checklist  Completed: patient identified, IV checked, site marked, risks and benefits discussed, surgical consent, monitors and equipment checked, pre-op evaluation and timeout performed  Peripheral Block  Patient position: supine  Prep: ChloraPrep  Patient monitoring: heart rate, continuous pulse ox, frequent blood pressure checks and cardiac monitor  Block type: adductor canal  Laterality: right  Injection technique: single shot  Needle  Needle type: Stimuplex   Needle gauge: 20 G  Needle length: 4 in  Needle localization: ultrasound guidance   -ultrasound image captured on disc.  Assessment  Injection assessment: negative parasthesia, local visualized surrounding nerve and negative aspiration  Paresthesia pain: none  Heart rate change: no  Slow fractionated injection: yes  Pain Tolerance: comfortable throughout block and no complaints  Medications:    Medications: ropivacaine (NAROPIN) solution 0.2% - Perineural   20 mL - 6/2/2022 12:18:00 PM

## 2022-06-02 NOTE — ANESTHESIA PROCEDURE NOTES
Intubation    Date/Time: 6/2/2022 9:44 AM  Performed by: Junito Sepulveda CRNA  Authorized by: Kwame Frank MD     Intubation:     Induction:  Intravenous    Intubated:  Postinduction    Mask Ventilation:  Easy mask    Attempts:  1    Attempted By:  Student (ALFONSO Morataya)    Method of Intubation:  Video laryngoscopy    Blade:  Torres 3    Laryngeal View Grade: Grade I - full view of cords      Difficult Airway Encountered?: No      Complications:  None    Airway Device:  Oral endotracheal tube    Airway Device Size:  7.0    Style/Cuff Inflation:  Cuffed (inflated to minimal occlusive pressure)    Inflation Amount (mL):  5    Tube secured:  21    Secured at:  The lips    Placement Verified By:  Capnometry    Complicating Factors:  None    Findings Post-Intubation:  BS equal bilateral and atraumatic/condition of teeth unchanged

## 2022-06-02 NOTE — PATIENT INSTRUCTIONS
Keep surgical extremity elevated  Ice as needed  Weightbearing as tolerated  You could bend her knee as much as it feels comfortable  Encourage active knee range of motion  Crutches as needed  Dressing can be removed in 3 days and you can take a shower.  No baths.  Follow-up in Orthopedic surgery Clinic in 2 weeks for suture removal and wound check.

## 2022-06-02 NOTE — H&P
I have read and updated the H&P on file. There are not changes to be made to the documentation on file.     To OR today for diagnostic ATS, PLM PMM as needed, staged ACL recon with allograft bone grafting.     - - -     Ernst Escalante MD   \[facility]\"" Orthopaedic Surgery, PGY-5    Patient ID:   Liza Byrne is a 51 y.o. female.     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 Byrne is returning for evaluation of the right knee.      Medications:     Current Outpatient Medications:     meloxicam (MOBIC) 7.5 MG tablet, Take 7.5-15 mg by mouth daily as needed., Disp: , Rfl:     mometasone (ELOCON) 0.1 % solution, Apply topically 2 (two) times daily., Disp: , Rfl:     sertraline (ZOLOFT) 50 MG tablet, Take 50 mg by mouth once daily., Disp: , Rfl:      Allergies:  Review of patient's allergies indicates:  No Known Allergies     Past Medical History:  No past medical history on file.      Past Surgical History:  No past surgical history on file.     Social History:  Social History           Occupational History    Not on file   Tobacco Use    Smoking status: Former Smoker    Smokeless tobacco: Never Used   Substance and Sexual Activity    Alcohol use: Yes    Drug use: Never    Sexual activity: Yes         Family History:  No family history on file.      ROS:  Review of Systems   Musculoskeletal: Positive for arthritis, joint pain, joint swelling and myalgias.   Neurological: Negative for numbness and paresthesias.   All other systems reviewed and are negative.        Vitals:  There were no vitals taken for this visit.     Physical Examination:  Comprehensive Orthopaedic Musculoskeletal Exam     General        Constitutional: appears stated age, well-developed and well-nourished    Scleral icterus: no    Labored breathing: no    Psychiatric: normal mood and affect and no acute distress    Neurological: alert and oriented x3    Skin: intact     Lymphadenopathy: none     Ortho Exam      Right knee exam:  Small vertical scar just medial to the tibia tubercle.   Positive 1+ effusion.   ROM on the right 0-135; left is +5 to 140  Lachmans 3B. 3+ pivot shift.   No medial or lateral laxity in full extension or 20 degrees.   Negative Dial test.   Tenderness along the medial and lateral joint lines. Positive McMurrays.      Imaging:     I have independently reviewed the following imaging studies performed at [facility]:     MRI Knee Without Contrast Right  Narrative: EXAMINATION:  MRI KNEE WITHOUT CONTRAST RIGHT     CLINICAL HISTORY:  Knee trauma, internal derangement suspected, xray done;     TECHNIQUE:  Routine MRI evaluation of the right knee without contrast using the following sequences: Coronal PDFS, PD; sagittal T2FS, T1; axial PDFS.     COMPARISON:  CT 04/11/2022.  Radiograph 03/29/2022.     FINDINGS:  Menisci: Suspected postoperative change of partial medial meniscectomy noting a diminutive caliber of the body segment and posterior horn.  Probable complex retear of the body segment noting a displaced flap into the superior recess of the medial gutter.     The lateral meniscus demonstrates the following abnormalities:     *Abnormal morphology and signal intensity of the posterior horn concerning for chronic tear with superimposed granulation or scar tissue.  *Abnormal morphology of the and signal intensity of the anterior root ligament concerning for chronic complex tear.  Ligaments: Postoperative changes of ACL reconstruction.  There is nonvisualization of the ACL graft in keeping with a chronic tear.  There is heterogeneous signal intensity within the tibial and femoral tunnels likely related to complex cystic expansion, better evaluated on previous CT.  PCL, MCL and posterolateral corner structures are intact.     Extensor Mechanism: Patellofemoral alignment is maintained.  Quadriceps and patellar tendons are intact.  MPFL and medial/lateral retinacula are  normal.     Cartilage:     *Patellofemoral: There is partial to full-thickness chondral fissuring overlying the patellar eminence, medial patellar facet, lateral patellar facet and medial and central trochlea.  No subchondral edema.  *Medial tibiofemoral: There is partial-thickness chondral fissuring overlying the medial aspect of the central and posterior weight-bearing portions of the femoral condyle.  Tibial cartilage appears grossly preserved.  *Lateral tibiofemoral: There is superficial chondral fissuring overlying the posterior weight-bearing portion of the tibial plateau.  Femoral cartilage appears preserved.  No subchondral edema.  Bones: There are small tricompartmental marginal osteophytes.  No fractures.  No avascular necrosis.  No marrow infiltrative process.     Miscellaneous: There is arthrofibrosis within Hoffa's fat pad.  There is a small joint effusion with mild synovial thickening.  No popliteal cyst.  Distal iliotibial band is normal.  Medial gastrocnemius, lateral gastrocnemius, distal semimembranosus and visualized pes anserine tendons appear unremarkable.  Distal iliotibial band is normal.  Neurovascular structures appear unremarkable.  Impression: 1. Chronic tear of the ACL graft and complex cystic expansion of the femoral and tibial tunnels, better evaluated on previous CT.  2. Postoperative changes of partial medial meniscectomy with probable complex retear of the body segment noting a displaced flap into the superior recess of the medial gutter.  3. Chronic tears of the anterior horn/anterior root ligament and posterior horn of the lateral meniscus.  4. Tricompartmental chondromalacia as detailed above.  5. Small joint effusion with associated synovitis.     Electronically signed by:         Aleksandr Ibanez MD  Date:                                        04/28/2022  Time:                                       16:56     EXAMINATION:  CT KNEE WITHOUT CONTRAST RIGHT     CLINICAL  HISTORY:  Bone lesion, knee, incidental;Osteolysis after remote ACL reconstructive surgery 20 years ago;  Other specified complication of internal orthopedic prosthetic devices, implants and grafts, initial encounter.     TECHNIQUE:  0.625 mm axial images of the knee (right) joint obtained, coronal and sagittal sagittal images reformatted, 3D reconstruction generated.     COMPARISON:  None     FINDINGS:  Prior history of ACL repair, the tibial tunnel is widened measuring 21 x 20 mm in its largest portion.     The femoral tunnel is also heterogeneous and enlarged measuring 21 x 19 mm.     The tunnels demonstrate fatty tissue and soft tissue component within.  I suspect a ACL graft tear.  There is significant calcification of the anterior horn of the lateral meniscus.     There is a small joint effusion.     The subcutaneous soft tissue and musculature appear normal.     Impression:     Empty and enlarged appearing tibial and femoral tunnels from prior ACL repair.  The ACL graft is probably torn.     Small joint effusion.        Electronically signed by: Rossy Espino MD  Date:                                            04/11/2022  Time:                                           16:11     Assessment:  1. ACL graft tear, initial encounter    2. Traumatic arthritis of right knee    3. Old complex tear of medial meniscus of right knee    4. Old complex tear of lateral meniscus of right knee       Plan:  I have reviewed the imaging studies with the patient in detail. I reviewed the surgical option of right knee diagnostic arthroscopy with meniscal treatment as indicated, bone grafting of the old expanded reconstruction tunnels, and revision ACL reconstruction with allograft. The risks, benefits, and alternatives were reviewed.     I have reviewed the H&P. There are no interval changes to report.

## 2022-06-02 NOTE — ANESTHESIA POSTPROCEDURE EVALUATION
Anesthesia Post Evaluation    Patient: Liza Byrne    Procedure(s) Performed: Procedure(s) (LRB):  Right knee arthroscopy, partial meniscectomy, bone grafting of old ACL tunnels (Right)    Final Anesthesia Type: general      Patient location during evaluation: PACU  Patient participation: Yes- Able to Participate  Level of consciousness: awake and alert  Post-procedure vital signs: reviewed and stable  Pain management: adequate  Airway patency: patent    PONV status at discharge: No PONV  Anesthetic complications: no      Cardiovascular status: blood pressure returned to baseline  Respiratory status: unassisted  Hydration status: euvolemic  Follow-up not needed.          Vitals Value Taken Time   /70 06/02/22 1355   Temp 36.6 °C (97.9 °F) 06/02/22 1355   Pulse 56 06/02/22 1355   Resp 16 06/02/22 1355   SpO2 97 % 06/02/22 1355         Event Time   Out of Recovery 12:48:50         Pain/Sia Score: Pain Rating Prior to Med Admin: 7 (6/2/2022 12:13 PM)  Sia Score: 10 (6/2/2022  1:55 PM)

## 2022-06-02 NOTE — PLAN OF CARE
Patient has met PACU discharge criteria, VSS, pain well controlled. Family updated by phone. Released from PACU by Dr. Frank

## 2022-06-02 NOTE — OP NOTE
Date of surgery:  June 2, 2022    Facility:  [facility]    Surgeon:  Josiah Butler MD    First assistant:  Ernst Cedillo MD    Second assistant:  Lior Ortiz MD    Preoperative diagnosis:  1. Right ACL graft rupture s/p ACL reconstruction performed elsewhere (chonic)  2. Right medial and lateral meniscal tears  3. Right ACL reconstructive tunnel expansion    Indication:  To improve pain.  To improve bone stock    Postoperative diagnosis:  1. Right ACL graft rupture s/p ACL reconstruction performed elsewhere (chonic)  2. Right medial and lateral meniscal tears  3. Right ACL reconstructive tunnel expansion    Anesthesia:   GETA + pre-operative adductor canal    Procedure:  1. Right knee arthroscopy with partial medial and lateral meniscectomies  2. Right knee arthroscopic-assisted and open bone grafting of expanded ACL tunnels on femur and tibia with allograft    The patient is a 51-year-old female who previously underwent ACL reconstruction approximately 20 years ago.  She has a chronic history of an unstable knee.  She recently presented to the outpatient clinic where x-rays showed large expanded reconstruction tunnels both on the femur and the tibia from her prior ACL surgery.  Her clinical exam revealed that she had significant instability in the knee.  MRI study showed that the graft was torn and that she had medial and lateral meniscal tears.  Recommendation was made for staged reconstruction of her knee.  First, she was to undergo right knee arthroscopy with meniscal treatment as indicated and bone grafting of the expanded reconstruction tunnels.  The 2nd stage would involve right ACL reconstruction.  Patient was 1st correctly identified in the preoperative holding area.  Written informed consent was verified.  The correct extremity was marked with a surgical pen.  The patient was brought into the operating room.  The patient was placed supine on operating room table.  General  anesthesia was administered.  A tourniquet was placed on the right thigh.  The thigh was secured in an arthroscopic leg chamorro.  The right knee was prepped and draped in the usual sterile fashion.  A surgical time-out was performed to verify the correct extremity as well as preoperative minutes duration of IV antibiotics.  The right lower extremity was exsanguinated and then the tourniquet was inflated to 250 mmHg.  A standard anterolateral portal was made.  An arthroscope was introduced into the patellofemoral compartment.  Cartilage on the patella was well preserved.  Grade 3 changes were seen on the trochlear groove.  Scope was placed down the lateral gutter.  Multiple loose bodies were seen and flushed out of the joint.  The scope was then placed down the medial gutter.  No loose bodies were seen.  Osteophytes were present.  Scope was then placed into the medial compartment.  Using an outside in technique, the medial portal was established.  Examination of the medial compartment revealed that there was grade 3 changes present on the medial femoral condyle.  Medial meniscus demonstrated evidence prior partial medial meniscectomy.  There was a tear involving the body of the medial meniscus with a flap.  This flap of meniscus was trimmed using an arthroscopic shaver to complete a partial medial meniscectomy.  The scope was then introduced into the intercondylar notch.  In the notch, there were multiple loose bodies that were present.  These were ostial chondral loose bodies.  These were removed.  The scope was then advanced into the lateral compartment.  Grade 3 changes were seen within the lateral compartment.  The lateral meniscus demonstrated tearing of the posterior horn body and anterior horn.  An arthroscopic shaver was used to perform a partial lateral meniscectomy back to a stable rim.  The scope was then advanced back into the intercondylar notch.  The prior femoral reconstruction tunnel was identified.   The tunnel was at the 12 o'clock position quite vertical.  A shaver was used to debride some the soft tissue at the entrance to the tunnel.  At this point, the shaver and scope were removed from the knee joint.  The previous scar over the proximal tibia was incised.  Dissection was performed down to the entrance to the tibial tunnel.  Once identified, a tibial tunnel was debrided.  A guide pin was placed into the tibial tunnel.  I verified satisfactory position of the guide pin intra-articularly.  Over the guide pin, I sequentially reamed the tunnel up to a 13 mm Reamer.  With the scope in the joint and looking down the tibial tunnel, a curette was used to debride any soft tissue that was present within the tunnel.  Next, a Beath pin was passed into the femoral tunnel.  The femoral tunnel was sequentially reamed to a 12 mm diameter.  Next a 12 mm allograft core was placed over the femoral Beath pin and impacted in place to bone graft the femoral tunnel.  The same was performed on the tibial side except a 14 mm graft was placed.  The graft was placed until it appeared flush at the intra articular opening of the tibial tunnel.  Cortical fibers were then used to bone graft the entrance to the tibial tunnel.  The instruments were removed from the knee joint.  A skin incision was closed with 2-0 buried Vicryl suture and then 3-0 nylon for the skin.  A sterile dressing was applied.  The patient was awakened and transferred to the postanesthesia care unit in stable condition.    Estimated blood loss:  Less than 50 cc    Complications:  None known    Drains:

## 2022-06-02 NOTE — TELEPHONE ENCOUNTER
----- Message from Josiah Butler MD sent at 6/2/2022  1:00 PM CDT -----  Regarding: PO Appt  Pt needs PO appt in about 2 weeks

## 2022-06-02 NOTE — TRANSFER OF CARE
"Anesthesia Transfer of Care Note    Patient: Liza Byrne    Procedure(s) Performed: Procedure(s) (LRB):  Right knee arthroscopy, partial meniscectomy, bone grafting of old ACL tunnels (Right)    Patient location: PACU    Anesthesia Type: general    Transport from OR: Transported from OR on 6-10 L/min O2 by face mask with adequate spontaneous ventilation    Post pain: adequate analgesia    Post assessment: no apparent anesthetic complications    Post vital signs: stable    Level of consciousness: awake and alert    Nausea/Vomiting: no nausea/vomiting    Complications: none    Transfer of care protocol was followed      Last vitals:   Visit Vitals  /79 (BP Location: Right arm, Patient Position: Sitting)   Pulse (!) 59   Temp 36.6 °C (97.9 °F) (Temporal)   Resp 16   Ht 5' 10" (1.778 m)   Wt 76.2 kg (168 lb)   LMP 05/09/2022   SpO2 100%   Breastfeeding No   BMI 24.11 kg/m²     "

## 2022-06-02 NOTE — ANESTHESIA PREPROCEDURE EVALUATION
06/02/2022  Liza Byrne is a 51 y.o., female.      Pre-op Assessment    I have reviewed the Patient Summary Reports.     I have reviewed the Nursing Notes. I have reviewed the NPO Status.   I have reviewed the Medications.     Review of Systems  Anesthesia Hx:  No problems with previous Anesthesia    Cardiovascular:  Cardiovascular Normal     Pulmonary:  Pulmonary Normal    Hepatic/GI:  Hepatic/GI Normal    Neurological:  Neurology Normal    Endocrine:  Endocrine Normal        Physical Exam  General: Well nourished, Cooperative, Alert and Oriented    Airway:  Mallampati: II   Mouth Opening: Normal    Chest/Lungs:  Clear to auscultation, Normal Respiratory Rate    Heart:  Rate: Normal  Rhythm: Regular Rhythm  Sounds: Normal        Anesthesia Plan  Type of Anesthesia, risks & benefits discussed:    Anesthesia Type: Gen ETT  Intra-op Monitoring Plan: Standard ASA Monitors  Post Op Pain Control Plan: multimodal analgesia  Induction:  IV  Airway Plan: Direct  Informed Consent: Informed consent signed with the Patient and all parties understand the risks and agree with anesthesia plan.  All questions answered.   ASA Score: 2    Ready For Surgery From Anesthesia Perspective.     .

## 2022-06-02 NOTE — H&P
Updated H&P    All pertinent history reviewed. No changes in the H&P below. OR today for right knee diagnostic arthroscopy with meniscal treatment as indicated, bone grafting of the old expanded reconstruction tunnels, and revision ACL reconstruction with allograft with Dr. Butler  __________________________________________________________    Patient ID:   Liza Byrne is a 51 y.o. female.     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 Byrne is returning for evaluation of the right knee.      Medications:     Current Outpatient Medications:     meloxicam (MOBIC) 7.5 MG tablet, Take 7.5-15 mg by mouth daily as needed., Disp: , Rfl:     mometasone (ELOCON) 0.1 % solution, Apply topically 2 (two) times daily., Disp: , Rfl:     sertraline (ZOLOFT) 50 MG tablet, Take 50 mg by mouth once daily., Disp: , Rfl:      Allergies:  Review of patient's allergies indicates:  No Known Allergies     Past Medical History:  No past medical history on file.      Past Surgical History:  No past surgical history on file.     Social History:  Social History           Occupational History    Not on file   Tobacco Use    Smoking status: Former Smoker    Smokeless tobacco: Never Used   Substance and Sexual Activity    Alcohol use: Yes    Drug use: Never    Sexual activity: Yes         Family History:  No family history on file.      ROS:  Review of Systems   Musculoskeletal: Positive for arthritis, joint pain, joint swelling and myalgias.   Neurological: Negative for numbness and paresthesias.   All other systems reviewed and are negative.        Vitals:  There were no vitals taken for this visit.     Physical Examination:  Comprehensive Orthopaedic Musculoskeletal Exam     General        Constitutional: appears stated age, well-developed and well-nourished    Scleral icterus: no    Labored breathing: no    Psychiatric: normal mood and affect and no acute distress     Neurological: alert and oriented x3    Skin: intact    Lymphadenopathy: none     Ortho Exam      Right knee exam:  Small vertical scar just medial to the tibia tubercle.   Positive 1+ effusion.   ROM on the right 0-135; left is +5 to 140  Lachmans 3B. 3+ pivot shift.   No medial or lateral laxity in full extension or 20 degrees.   Negative Dial test.   Tenderness along the medial and lateral joint lines. Positive McMurrays.      Imaging:     I have independently reviewed the following imaging studies performed at [facility]:     MRI Knee Without Contrast Right  Narrative: EXAMINATION:  MRI KNEE WITHOUT CONTRAST RIGHT     CLINICAL HISTORY:  Knee trauma, internal derangement suspected, xray done;     TECHNIQUE:  Routine MRI evaluation of the right knee without contrast using the following sequences: Coronal PDFS, PD; sagittal T2FS, T1; axial PDFS.     COMPARISON:  CT 04/11/2022.  Radiograph 03/29/2022.     FINDINGS:  Menisci: Suspected postoperative change of partial medial meniscectomy noting a diminutive caliber of the body segment and posterior horn.  Probable complex retear of the body segment noting a displaced flap into the superior recess of the medial gutter.     The lateral meniscus demonstrates the following abnormalities:     *Abnormal morphology and signal intensity of the posterior horn concerning for chronic tear with superimposed granulation or scar tissue.  *Abnormal morphology of the and signal intensity of the anterior root ligament concerning for chronic complex tear.  Ligaments: Postoperative changes of ACL reconstruction.  There is nonvisualization of the ACL graft in keeping with a chronic tear.  There is heterogeneous signal intensity within the tibial and femoral tunnels likely related to complex cystic expansion, better evaluated on previous CT.  PCL, MCL and posterolateral corner structures are intact.     Extensor Mechanism: Patellofemoral alignment is maintained.  Quadriceps and patellar  tendons are intact.  MPFL and medial/lateral retinacula are normal.     Cartilage:     *Patellofemoral: There is partial to full-thickness chondral fissuring overlying the patellar eminence, medial patellar facet, lateral patellar facet and medial and central trochlea.  No subchondral edema.  *Medial tibiofemoral: There is partial-thickness chondral fissuring overlying the medial aspect of the central and posterior weight-bearing portions of the femoral condyle.  Tibial cartilage appears grossly preserved.  *Lateral tibiofemoral: There is superficial chondral fissuring overlying the posterior weight-bearing portion of the tibial plateau.  Femoral cartilage appears preserved.  No subchondral edema.  Bones: There are small tricompartmental marginal osteophytes.  No fractures.  No avascular necrosis.  No marrow infiltrative process.     Miscellaneous: There is arthrofibrosis within Hoffa's fat pad.  There is a small joint effusion with mild synovial thickening.  No popliteal cyst.  Distal iliotibial band is normal.  Medial gastrocnemius, lateral gastrocnemius, distal semimembranosus and visualized pes anserine tendons appear unremarkable.  Distal iliotibial band is normal.  Neurovascular structures appear unremarkable.  Impression: 1. Chronic tear of the ACL graft and complex cystic expansion of the femoral and tibial tunnels, better evaluated on previous CT.  2. Postoperative changes of partial medial meniscectomy with probable complex retear of the body segment noting a displaced flap into the superior recess of the medial gutter.  3. Chronic tears of the anterior horn/anterior root ligament and posterior horn of the lateral meniscus.  4. Tricompartmental chondromalacia as detailed above.  5. Small joint effusion with associated synovitis.     Electronically signed by:         Aleksandr Ibanez MD  Date:                                        04/28/2022  Time:                                       16:56      EXAMINATION:  CT KNEE WITHOUT CONTRAST RIGHT     CLINICAL HISTORY:  Bone lesion, knee, incidental;Osteolysis after remote ACL reconstructive surgery 20 years ago;  Other specified complication of internal orthopedic prosthetic devices, implants and grafts, initial encounter.     TECHNIQUE:  0.625 mm axial images of the knee (right) joint obtained, coronal and sagittal sagittal images reformatted, 3D reconstruction generated.     COMPARISON:  None     FINDINGS:  Prior history of ACL repair, the tibial tunnel is widened measuring 21 x 20 mm in its largest portion.     The femoral tunnel is also heterogeneous and enlarged measuring 21 x 19 mm.     The tunnels demonstrate fatty tissue and soft tissue component within.  I suspect a ACL graft tear.  There is significant calcification of the anterior horn of the lateral meniscus.     There is a small joint effusion.     The subcutaneous soft tissue and musculature appear normal.     Impression:     Empty and enlarged appearing tibial and femoral tunnels from prior ACL repair.  The ACL graft is probably torn.     Small joint effusion.        Electronically signed by: Rossy Espino MD  Date:                                            04/11/2022  Time:                                           16:11     Assessment:  1. ACL graft tear, initial encounter    2. Traumatic arthritis of right knee    3. Old complex tear of medial meniscus of right knee    4. Old complex tear of lateral meniscus of right knee       Plan:  I have reviewed the imaging studies with the patient in detail. I reviewed the surgical option of right knee diagnostic arthroscopy with meniscal treatment as indicated, bone grafting of the old expanded reconstruction tunnels, and revision ACL reconstruction with allograft. The risks, benefits, and alternatives were reviewed.   No follow-ups on file.    Lior Ortiz MD  [facility] PGY3    I have reviewed the H&P. There are no interval changes to  report.

## 2022-06-04 ENCOUNTER — NURSE TRIAGE (OUTPATIENT)
Dept: ADMINISTRATIVE | Facility: CLINIC | Age: 52
End: 2022-06-04
Payer: COMMERCIAL

## 2022-06-04 NOTE — TELEPHONE ENCOUNTER
and pt calling with questions regarding recent knee surgery. Discharge instructions reviewed. Instruction to remove dressing after 3 days and to shower provided. No baths. Pat dry. Leave open to air. No indication of brace use. Educated on WBAT.   Instructed to call back with any further questions, concerns, new symptoms. Verbalizes understanding.     Reason for Disposition   Health Information question, no triage required and triager able to answer question    Protocols used: INFORMATION ONLY CALL - NO TRIAGE-A-

## 2022-06-17 ENCOUNTER — OFFICE VISIT (OUTPATIENT)
Dept: ORTHOPEDICS | Facility: CLINIC | Age: 52
End: 2022-06-17
Payer: COMMERCIAL

## 2022-06-17 VITALS
HEART RATE: 81 BPM | HEIGHT: 70 IN | DIASTOLIC BLOOD PRESSURE: 76 MMHG | SYSTOLIC BLOOD PRESSURE: 118 MMHG | BODY MASS INDEX: 24.08 KG/M2 | WEIGHT: 168.19 LBS

## 2022-06-17 DIAGNOSIS — S83.511D RUPTURE OF ANTERIOR CRUCIATE LIGAMENT OF RIGHT KNEE, SUBSEQUENT ENCOUNTER: Primary | ICD-10-CM

## 2022-06-17 PROCEDURE — 1160F PR REVIEW ALL MEDS BY PRESCRIBER/CLIN PHARMACIST DOCUMENTED: ICD-10-PCS | Mod: CPTII,S$GLB,, | Performed by: ORTHOPAEDIC SURGERY

## 2022-06-17 PROCEDURE — 99024 PR POST-OP FOLLOW-UP VISIT: ICD-10-PCS | Mod: S$GLB,,, | Performed by: ORTHOPAEDIC SURGERY

## 2022-06-17 PROCEDURE — 1160F RVW MEDS BY RX/DR IN RCRD: CPT | Mod: CPTII,S$GLB,, | Performed by: ORTHOPAEDIC SURGERY

## 2022-06-17 PROCEDURE — 3074F PR MOST RECENT SYSTOLIC BLOOD PRESSURE < 130 MM HG: ICD-10-PCS | Mod: CPTII,S$GLB,, | Performed by: ORTHOPAEDIC SURGERY

## 2022-06-17 PROCEDURE — 99999 PR PBB SHADOW E&M-EST. PATIENT-LVL III: CPT | Mod: PBBFAC,,, | Performed by: ORTHOPAEDIC SURGERY

## 2022-06-17 PROCEDURE — 3008F BODY MASS INDEX DOCD: CPT | Mod: CPTII,S$GLB,, | Performed by: ORTHOPAEDIC SURGERY

## 2022-06-17 PROCEDURE — 3008F PR BODY MASS INDEX (BMI) DOCUMENTED: ICD-10-PCS | Mod: CPTII,S$GLB,, | Performed by: ORTHOPAEDIC SURGERY

## 2022-06-17 PROCEDURE — 1159F PR MEDICATION LIST DOCUMENTED IN MEDICAL RECORD: ICD-10-PCS | Mod: CPTII,S$GLB,, | Performed by: ORTHOPAEDIC SURGERY

## 2022-06-17 PROCEDURE — 3078F DIAST BP <80 MM HG: CPT | Mod: CPTII,S$GLB,, | Performed by: ORTHOPAEDIC SURGERY

## 2022-06-17 PROCEDURE — 99024 POSTOP FOLLOW-UP VISIT: CPT | Mod: S$GLB,,, | Performed by: ORTHOPAEDIC SURGERY

## 2022-06-17 PROCEDURE — 3078F PR MOST RECENT DIASTOLIC BLOOD PRESSURE < 80 MM HG: ICD-10-PCS | Mod: CPTII,S$GLB,, | Performed by: ORTHOPAEDIC SURGERY

## 2022-06-17 PROCEDURE — 99999 PR PBB SHADOW E&M-EST. PATIENT-LVL III: ICD-10-PCS | Mod: PBBFAC,,, | Performed by: ORTHOPAEDIC SURGERY

## 2022-06-17 PROCEDURE — 1159F MED LIST DOCD IN RCRD: CPT | Mod: CPTII,S$GLB,, | Performed by: ORTHOPAEDIC SURGERY

## 2022-06-17 PROCEDURE — 3074F SYST BP LT 130 MM HG: CPT | Mod: CPTII,S$GLB,, | Performed by: ORTHOPAEDIC SURGERY

## 2022-06-19 NOTE — PROGRESS NOTES
"Patient ID:   Liza Byrne is a 51 y.o. female.    Chief Complaint:   Surgical aftercare s/p right knee arthroscopy, bone grafting ACL tunnels, PLM, PMM    HPI:   Mrs. Byrne is returning for evaluation of her knee. She is here today for suture removal.     Medications:    Current Outpatient Medications:     meloxicam (MOBIC) 7.5 MG tablet, Take 7.5-15 mg by mouth daily as needed., Disp: , Rfl:     mometasone (ELOCON) 0.1 % solution, Apply topically 2 (two) times daily., Disp: , Rfl:     ondansetron (ZOFRAN) 8 MG tablet, Take 1 tablet (8 mg total) by mouth every 12 (twelve) hours as needed for Nausea., Disp: 24 tablet, Rfl: 0    oxyCODONE-acetaminophen (PERCOCET)  mg per tablet, Take 1 tablet by mouth every 6 (six) hours as needed for Pain., Disp: 28 tablet, Rfl: 0    sertraline (ZOLOFT) 50 MG tablet, Take 50 mg by mouth once daily., Disp: , Rfl:     Allergies:  Review of patient's allergies indicates:  No Known Allergies    Vitals:  /76 (BP Location: Left arm, Patient Position: Sitting, BP Method: Large (Automatic))   Pulse 81   Ht 5' 10" (1.778 m)   Wt 76.3 kg (168 lb 3.4 oz)   BMI 24.14 kg/m²     Physical Examination:  Ortho Exam   Right knee exam:  Positive effusion.   ROM 0-130  Surgical incisions C/D/I    Assessment:  1. Rupture of anterior cruciate ligament of right knee, subsequent encounter      Plan:  Suture removal today. Continue PT. Functional ACL brace. Follow-up when she return to the area.     Orders Placed This Encounter    HME - OTHER     No follow-ups on file.           "

## 2022-08-27 ENCOUNTER — OFFICE VISIT (OUTPATIENT)
Dept: URGENT CARE | Facility: CLINIC | Age: 52
End: 2022-08-27
Payer: COMMERCIAL

## 2022-08-27 VITALS
SYSTOLIC BLOOD PRESSURE: 107 MMHG | BODY MASS INDEX: 24.05 KG/M2 | TEMPERATURE: 99 F | OXYGEN SATURATION: 97 % | RESPIRATION RATE: 24 BRPM | HEART RATE: 85 BPM | WEIGHT: 168 LBS | DIASTOLIC BLOOD PRESSURE: 69 MMHG | HEIGHT: 70 IN

## 2022-08-27 DIAGNOSIS — R07.9 CHEST PAIN, UNSPECIFIED TYPE: Primary | ICD-10-CM

## 2022-08-27 DIAGNOSIS — R06.02 SHORTNESS OF BREATH: ICD-10-CM

## 2022-08-27 PROCEDURE — 3008F PR BODY MASS INDEX (BMI) DOCUMENTED: ICD-10-PCS | Mod: CPTII,S$GLB,,

## 2022-08-27 PROCEDURE — 3078F PR MOST RECENT DIASTOLIC BLOOD PRESSURE < 80 MM HG: ICD-10-PCS | Mod: CPTII,S$GLB,,

## 2022-08-27 PROCEDURE — 93005 ELECTROCARDIOGRAM TRACING: CPT | Mod: S$GLB,,,

## 2022-08-27 PROCEDURE — 1160F RVW MEDS BY RX/DR IN RCRD: CPT | Mod: CPTII,S$GLB,,

## 2022-08-27 PROCEDURE — 93010 EKG 12-LEAD: ICD-10-PCS | Mod: S$GLB,,, | Performed by: INTERNAL MEDICINE

## 2022-08-27 PROCEDURE — 71046 X-RAY EXAM CHEST 2 VIEWS: CPT | Mod: S$GLB,,, | Performed by: RADIOLOGY

## 2022-08-27 PROCEDURE — 3078F DIAST BP <80 MM HG: CPT | Mod: CPTII,S$GLB,,

## 2022-08-27 PROCEDURE — 99204 OFFICE O/P NEW MOD 45 MIN: CPT | Mod: S$GLB,,,

## 2022-08-27 PROCEDURE — 3074F SYST BP LT 130 MM HG: CPT | Mod: CPTII,S$GLB,,

## 2022-08-27 PROCEDURE — 71046 XR CHEST PA AND LATERAL: ICD-10-PCS | Mod: S$GLB,,, | Performed by: RADIOLOGY

## 2022-08-27 PROCEDURE — 1159F MED LIST DOCD IN RCRD: CPT | Mod: CPTII,S$GLB,,

## 2022-08-27 PROCEDURE — 93005 EKG 12-LEAD: ICD-10-PCS | Mod: S$GLB,,,

## 2022-08-27 PROCEDURE — 3074F PR MOST RECENT SYSTOLIC BLOOD PRESSURE < 130 MM HG: ICD-10-PCS | Mod: CPTII,S$GLB,,

## 2022-08-27 PROCEDURE — 3008F BODY MASS INDEX DOCD: CPT | Mod: CPTII,S$GLB,,

## 2022-08-27 PROCEDURE — 99204 PR OFFICE/OUTPT VISIT, NEW, LEVL IV, 45-59 MIN: ICD-10-PCS | Mod: S$GLB,,,

## 2022-08-27 PROCEDURE — 1159F PR MEDICATION LIST DOCUMENTED IN MEDICAL RECORD: ICD-10-PCS | Mod: CPTII,S$GLB,,

## 2022-08-27 PROCEDURE — 93010 ELECTROCARDIOGRAM REPORT: CPT | Mod: S$GLB,,, | Performed by: INTERNAL MEDICINE

## 2022-08-27 PROCEDURE — 1160F PR REVIEW ALL MEDS BY PRESCRIBER/CLIN PHARMACIST DOCUMENTED: ICD-10-PCS | Mod: CPTII,S$GLB,,

## 2022-08-27 NOTE — PROGRESS NOTES
"Subjective:       Patient ID: Liza Byrne is a 51 y.o. female.    Vitals:  height is 5' 10" (1.778 m) and weight is 76.2 kg (168 lb). Her temperature is 98.5 °F (36.9 °C). Her blood pressure is 107/69 and her pulse is 85. Her respiration is 24 (abnormal) and oxygen saturation is 97%.     Chief Complaint: Chest Pain    Pt presents with complaint of left sided pain when breathing x3 days.  Pt denies any injury mechanism and says she just returned from a three week long roadtrip.  Pt states she is unable to sleep on her sides due to the pain.  Pt states she took ibuprofen last night for her pain    Provider note starts below:  Patient presents endorsing left-sided chest pain and shortness of breath for 3 days.  The pain is under her left breast and worsens with deep breaths or if she lays on her sides.  She has she has to lay on her back at night.  It is slightly reproducible if she presses on her ribs under her left breast.  She denies any radiation with the pain, palpitations, wheezing, left arm numbness or tingling, nausea, vomiting or jaw pain.  he denies any trauma to the area, fever, chills. She states she has also started to feel short of breath and today felt she was restricted in her breathing and could not take a deep breath.  She also endorses of shuttering breath that happens spontaneously.  She states over the last 3 weeks she has been on a road trip with about 5-7 hours at a time in the vehicle.  She also states in June she had surgery on her right knee and was immobile for a week but after that was soon back to normal activity.  She denies any history of blood clots, history of calf pain, leg swelling or erythema.  She is a former smoker from age [age] and recently smoked for a month when [place] [employer] [place] was announced.  She does not take any blood thinners. No family hx of blood disorders.     Chest Pain   This is a new problem. The current episode started in the past 7 days. The onset quality is sudden. " The problem occurs constantly. The problem has been unchanged. The pain is present in the lateral region. The pain is at a severity of 6/10. The pain is moderate. The quality of the pain is described as sharp and stabbing. The pain does not radiate. Associated symptoms include shortness of breath. Pertinent negatives include no cough, fever, hemoptysis, nausea, numbness, palpitations or vomiting. The pain is aggravated by breathing and movement. She has tried NSAIDs for the symptoms. The treatment provided no relief.     Constitution: Negative for chills and fever.   Cardiovascular:  Positive for chest pain. Negative for chest trauma, leg swelling and palpitations.   Respiratory:  Positive for shortness of breath. Negative for cough and bloody sputum.    Gastrointestinal:  Negative for nausea and vomiting.   Musculoskeletal:  Positive for pain. Negative for trauma.   Neurological:  Negative for numbness and tingling.   Hematologic/Lymphatic: Negative for easy bruising/bleeding, trouble clotting, history of blood clots and history of bleeding disorder. Does not bruise/bleed easily.   Psychiatric/Behavioral:  Positive for nervous/anxious. The patient is nervous/anxious.      Objective:      Physical Exam   Constitutional: normal  HENT:   Head: Normocephalic and atraumatic.   Eyes: Conjunctivae are normal. Extraocular movement intact   Cardiovascular: Normal rate, regular rhythm, normal heart sounds and normal pulses.   No murmur heard.  Pulses:       Dorsalis pedis pulses are 2+ on the right side and 2+ on the left side. Exam reveals no gallop and no friction rub.   Pulmonary/Chest: Breath sounds normal. No stridor. Tachypnea noted. She has no decreased breath sounds. She has no wheezes. She has no rhonchi. She has no rales. She exhibits tenderness. She exhibits no crepitus.   Mild tachypnea noted on exam- takes deep breath after long sentences- breath sounds heard bilaterally             Comments: Mild tachypnea  noted on exam- takes deep breath after long sentences- breath sounds heard bilaterally    Abdominal: Normal appearance.   Musculoskeletal:      Right lower leg: No edema.      Left lower leg: No edema.   Neurological: She is alert.   Skin: Skin is not diaphoretic.   Nursing note and vitals reviewed.    XR CHEST PA AND LATERAL    Result Date: 8/27/2022  EXAMINATION: XR CHEST PA AND LATERAL CLINICAL HISTORY: Chest pain, unspecified TECHNIQUE: PA and lateral views of the chest were performed. COMPARISON: None FINDINGS: Cardiomediastinal silhouette appears to be within normal limits.  Lungs appear essentially clear.  No definite pneumothorax or large volume pleural effusion.  No acute findings in the visualized abdomen.  Osseous and soft tissue structures appear essentially within normal limits.     No convincing evidence of acute cardiopulmonary disease. Electronically signed by: Wong Dobson Date:    08/27/2022 Time:    13:46    XR RIB LEFT W/ PA CHEST    Result Date: 8/27/2022  EXAMINATION: XR RIBS MIN 3 VIEWS W/ PA CHEST LEFT CLINICAL HISTORY: Chest pain, unspecified TECHNIQUE: Four views left ribs. COMPARISON: Correlation with same day chest radiograph. FINDINGS: No convincing evidence of acute displaced left-sided rib fracture.  Findings in the chest reported separately.     As above. Electronically signed by: Wong Dobson Date:    08/27/2022 Time:    13:47     EKG: normal sinus rhythm; no sign of IA elevation or depression or st segment changes  Assessment:       1. Chest pain, unspecified type    2. Shortness of breath          Plan:     EKG and CXR reviewed. Extensive shared decision making conversation had between the patient. Discussed Ddx including pneumonia, pneumothorax, PE, rib fracture, muscular etiology. Based on wells score and PERC rule, low risk for PE. However, on exam, patient is mildly short of breath after long sentences with no underlying cause or etiology. Tachypneic to 20-24 on exam.  Recent surgery and stasis during travel are concerning given presentation. Advised patient that we could not rule out PE in this setting and was strongly encouraged to go to emergency room for further evaluation. Patient stated she was unsure if she wanted to go to the emergency room and would like to discuss with her . We discussed dangers of PE. Advised that if any of her symptoms worsen to go to the ED immediately. patient voiced understanding. All questions answered. Case discussed with Dr. Cynthia Abdul who agreed with plan. Educational information and ED address given via [employer] portal.    Chest pain, unspecified type  -     XR CHEST PA AND LATERAL; Future; Expected date: 08/27/2022  -     IN OFFICE EKG 12-LEAD (to Muse)  -     XR RIB LEFT W/ PA CHEST; Future; Expected date: 08/27/2022    Shortness of breath  -     XR CHEST PA AND LATERAL; Future; Expected date: 08/27/2022  -     IN OFFICE EKG 12-LEAD (to Muse)  -     XR RIB LEFT W/ PA CHEST; Future; Expected date: 08/27/2022            Additional MDM:   PERC Rule:   Age is greater than or equal to 50 = 0.0  Heart Rate is greater than or equal to 100 = 0.0  SaO2 on room air < 95% = 0.0  Unilateral leg swelling = 0.0  Hemoptysis = 0.0  Recent surgery or trauma = 0.0  Prior PE or DVT =  0.0  Hormone use = 0.00  PERC Score = 0    Well's Criteria Score:  -Clinical symptoms of DVT (leg swelling, pain with palpation) = 0.0  -Other diagnosis less likely than pulmonary embolism =            0.0  -Heart Rate >100 =   0.0  -Immobilization (= or > than 3 days) or surgery in the previous 4 weeks = 0.0  -Previous DVT/PE = 0.0  -Hemoptysis =          0.0  -Malignancy =           0.0  Well's Probability Score =    0      Patient Instructions   PLEASE READ ALL DISCHARGE INSTRUCTIONS    ER Referral   Your condition is serious and requires immediate attention and evaluation in an ER setting.    [facility]  [address] [place] [address]  [id number].    GO  STRAIGHT TO THE ER AND DO NOT EAT OR DRINK ANYTHING UNLESS A HEALTHCARE PROVIDER GIVES IT TO YOU.

## 2022-08-27 NOTE — PATIENT INSTRUCTIONS
PLEASE READ ALL DISCHARGE INSTRUCTIONS    ER Referral   Your condition is serious and requires immediate attention and evaluation in an ER setting.    [facility] [address] [place]  [address].    GO STRAIGHT TO THE ER AND DO NOT EAT OR DRINK ANYTHING UNLESS A HEALTHCARE PROVIDER GIVES IT TO YOU.

## 2022-09-01 ENCOUNTER — TELEPHONE (OUTPATIENT)
Dept: URGENT CARE | Facility: CLINIC | Age: 52
End: 2022-09-01
Payer: COMMERCIAL

## 2022-09-11 ENCOUNTER — HOSPITAL ENCOUNTER (EMERGENCY)
Facility: OTHER | Age: 52
Discharge: HOME | End: 2022-09-11
Attending: EMERGENCY MEDICINE
Payer: COMMERCIAL

## 2022-09-11 VITALS
HEART RATE: 70 BPM | WEIGHT: 170 LBS | HEIGHT: 70 IN | TEMPERATURE: 98 F | DIASTOLIC BLOOD PRESSURE: 68 MMHG | SYSTOLIC BLOOD PRESSURE: 105 MMHG | OXYGEN SATURATION: 96 % | BODY MASS INDEX: 24.34 KG/M2 | RESPIRATION RATE: 16 BRPM

## 2022-09-11 DIAGNOSIS — M25.561 ACUTE PAIN OF RIGHT KNEE: Primary | ICD-10-CM

## 2022-09-11 DIAGNOSIS — R52 PAIN: ICD-10-CM

## 2022-09-11 LAB
B-HCG UR QL: NEGATIVE
CTP QC/QA: YES

## 2022-09-11 PROCEDURE — 96372 THER/PROPH/DIAG INJ SC/IM: CPT | Performed by: PHYSICIAN ASSISTANT

## 2022-09-11 PROCEDURE — 63600175 PHARM REV CODE 636 W HCPCS: Performed by: PHYSICIAN ASSISTANT

## 2022-09-11 PROCEDURE — 29505 APPLICATION LONG LEG SPLINT: CPT | Mod: RT

## 2022-09-11 PROCEDURE — 81025 URINE PREGNANCY TEST: CPT | Performed by: PHYSICIAN ASSISTANT

## 2022-09-11 PROCEDURE — 99285 EMERGENCY DEPT VISIT HI MDM: CPT | Mod: 25

## 2022-09-11 RX ORDER — HYDROMORPHONE HYDROCHLORIDE 1 MG/ML
1 INJECTION, SOLUTION INTRAMUSCULAR; INTRAVENOUS; SUBCUTANEOUS
Status: COMPLETED | OUTPATIENT
Start: 2022-09-11 | End: 2022-09-11

## 2022-09-11 RX ORDER — HYDROCODONE BITARTRATE AND ACETAMINOPHEN 5; 325 MG/1; MG/1
1 TABLET ORAL EVERY 4 HOURS PRN
Qty: 18 TABLET | Refills: 0 | Status: SHIPPED | OUTPATIENT
Start: 2022-09-11 | End: 2022-09-27

## 2022-09-11 RX ADMIN — HYDROMORPHONE HYDROCHLORIDE 1 MG: 1 INJECTION, SOLUTION INTRAMUSCULAR; INTRAVENOUS; SUBCUTANEOUS at 02:09

## 2022-09-11 NOTE — ED TRIAGE NOTES
Pt presents to ED c/o R knee pain s/p dislocation while having intercourse. Pt states she had bone graft 6/6/22 in preparation for ACL surgery. States she felt a pop when it occurred and attempted to pop it back in herself by extending her leg but was unsuccessful. Hx of multiple dislocations to this knee, usually able to self-reduce. States this is her first since surgery. Pt arrived to room in wheelchair and holding knee with arms, unable to extend. +pedal pulse, ROM intact for all toes on affected foot.

## 2022-09-12 ENCOUNTER — TELEPHONE (OUTPATIENT)
Dept: ORTHOPEDICS | Facility: CLINIC | Age: 52
End: 2022-09-12
Payer: COMMERCIAL

## 2022-09-12 NOTE — ED PROVIDER NOTES
Encounter Date: 9/11/2022       History     Chief Complaint   Patient presents with    Leg Pain     Bone graft 6/6/22 to R knee in preparation for ACL surgery. Hx of knee dislocations. States she feels her knee dislocated again while having intercourse this am. Unable to extend leg. States she attempted to put it back in place herself.      Patient is a 51-year-old female with history of multiple right knee surgeries who presents to the emergency department with concern for dislocation.  Patient reports she was having intercourse, when she straightened her right knee and felt a pop.  She states typically when this happens she moves the leg and is able to get it back in place.  She reports she is not able to bear weight.  She states this feels different this time.  She reports swelling.  She denies numbness or tingling.    The history is provided by the patient.   Review of patient's allergies indicates:  No Known Allergies  No past medical history on file.  Past Surgical History:   Procedure Laterality Date    KNEE ARTHROSCOPY W/ MENISCECTOMY Right 6/2/2022    Procedure: Right knee arthroscopy, partial meniscectomy, bone grafting of old ACL tunnels;  Surgeon: Josiah Butler MD;  Location: [facility];  Service: Orthopedics;  Laterality: Right;  Linvatec ACL instrumentation, Cloward bone graft dowels     No family history on file.  Social History     Tobacco Use    Smoking status: Former    Smokeless tobacco: Never   Substance Use Topics    Alcohol use: Yes    Drug use: Never     Review of Systems   Constitutional:  Negative for activity change, appetite change, chills, fatigue and fever.   HENT:  Negative for congestion, ear discharge, ear pain, postnasal drip, rhinorrhea, sore throat and trouble swallowing.    Respiratory:  Negative for cough and shortness of breath.    Cardiovascular:  Negative for chest pain.   Gastrointestinal:  Negative for abdominal pain, blood in stool, constipation, diarrhea, nausea and  vomiting.   Genitourinary:  Negative for dysuria, flank pain and hematuria.   Musculoskeletal:  Negative for back pain, neck pain and neck stiffness.        Knee pain   Skin:  Negative for rash and wound.   Neurological:  Negative for dizziness, weakness, light-headedness and headaches.     Physical Exam     Initial Vitals [09/11/22 1218]   BP Pulse Resp Temp SpO2   119/60 73 18 97.5 °F (36.4 °C) 99 %      MAP       --         Physical Exam    Nursing note and vitals reviewed.  Constitutional: She appears well-developed and well-nourished. She is not diaphoretic. She appears distressed (secondary to pain).   HENT:   Head: Normocephalic.   Right Ear: External ear normal.   Left Ear: External ear normal.   Nose: Nose normal.   Eyes: Conjunctivae are normal. Pupils are equal, round, and reactive to light.   Neck:   Normal range of motion.  Cardiovascular:  Normal rate and regular rhythm.           Pulmonary/Chest: Breath sounds normal.   Musculoskeletal:      Cervical back: Normal range of motion.      Right knee: Swelling and bony tenderness present. Decreased range of motion. Tenderness present.     Neurological: She is alert and oriented to person, place, and time.   Skin: Skin is warm and dry. Capillary refill takes less than 2 seconds.   Psychiatric: She has a normal mood and affect.       ED Course   Procedures  Labs Reviewed   POCT URINE PREGNANCY          Imaging Results              CT Knee Without Contrast Right (Final result)  Result time 09/11/22 15:35:55      Final result by Aleksandr Ibanez MD (09/11/22 15:35:55)                   Impression:      1. No acute displaced fractures.  2. Remote postoperative change of ACL reconstruction with recent bone grafting of the expanded femoral and tibial tunnels.      Electronically signed by: Aleksandr Ibanez MD  Date:    09/11/2022  Time:    15:35               Narrative:    EXAMINATION:  CT KNEE WITHOUT CONTRAST RIGHT    CLINICAL HISTORY:  Knee trauma, occult  fracture suspected, xray done;    TECHNIQUE:  0.625 mm axial images were obtained through the right knee without the use of contrast.  Coronal and sagittal reformats were performed.    COMPARISON:  Radiograph 09/11/2014.  MRI 04/28/2022.    FINDINGS:  Postoperative change of ACL reconstruction and recent bone grafting of expanded femoral and tibial tunnels.  No acute displaced fracture.  No suspicious lytic or blastic lesions.  There are tibiofemoral marginal osteophytes.  Mild medial tibiofemoral cartilage space narrowing.  No subluxation or dislocation.  Trace suprapatellar joint fluid.  Visualized musculature demonstrates normal bulk.  Subcutaneous soft tissues are within normal limits.                                       X-Ray Knee 3 View Right (Final result)  Result time 09/11/22 12:47:26      Final result by Ewa Aragon MD (09/11/22 12:47:26)                   Impression:      1. Stable postsurgical change of the knee  2. Moderate osteoarthritis      Electronically signed by: Ewa Aragon MD  Date:    09/11/2022  Time:    12:47               Narrative:    EXAMINATION:  XR KNEE 3 VIEW RIGHT    CLINICAL HISTORY:  Pain, unspecified    TECHNIQUE:  AP, lateral, and Merchant views of the right knee were performed.    COMPARISON:  Prior dated 03/29/22 and MRI of the knee dated 04/28/2022    FINDINGS:  There is moderate medial compartment joint space narrowing and tricompartmental marginal osteophytosis.  There is postsurgical change of the femur and tibia similar to prior exam suggesting remote ACL repair.  No joint effusion.  No acute fracture.                                       Medications   HYDROmorphone injection 1 mg (1 mg Intramuscular Given 9/11/22 1409)     Medical Decision Making:   Initial Assessment:   Urgent evaluation of a 51-year-old female who presents to the emergency department with knee pain.  Patient has had multiple surgeries to right knee.  Recent ACL graft with anticipation to  repair ACL in the next couple of months.  On exam, patient cannot fully extend or flex.  She cannot bear weight.  X-ray reveals no acute osseous injury.  CT scan is obtained also revealing no acute displaced fractures.  Discussed case with patient's Orthopedic Dr. Butler.  He advised knee immobilizer and follow-up in his clinic on Tuesday.  Patient is given pain medication and advised to follow up with Ortho.  Advised to return to the emergency department with any worsening symptoms or concerns.                    Clinical Impression:   Final diagnoses:  [R52] Pain  [M25.561] Acute pain of right knee (Primary)        ED Disposition Condition    Discharge Stable          ED Prescriptions       Medication Sig Dispense Start Date End Date Auth. Provider    HYDROcodone-acetaminophen (NORCO) 5-325 mg per tablet Take 1 tablet by mouth every 4 (four) hours as needed for Pain. 18 tablet 9/11/2022 -- Lidia Wilcox PA-C          Follow-up Information       Follow up With Specialties Details Why Contact Info    Josiah Butler MD Orthopedic Surgery   [address]  [address]  [facility] [address]  [phone]               Lidia Wilcox PA-C  09/11/22 1951

## 2022-09-12 NOTE — TELEPHONE ENCOUNTER
----- Message from Marie Meléndez sent at 9/12/2022  8:14 AM CDT -----  Contact: [phone]  Type:  Same Day Appointment Request    Caller is requesting a same day appointment.  Caller declined first available appointment listed below.    Name of Caller:Pt  When is the first available appointment?9/13  Symptoms: pt is having severe Knee pain, was in the ER and they spoke with the Dr and was to schedule a appt for tomorrow, pt would like to be seen today if possible even if it is with someone Else  Best Call Back Number:[phone]

## 2022-09-13 ENCOUNTER — TELEPHONE (OUTPATIENT)
Dept: ORTHOPEDICS | Facility: CLINIC | Age: 52
End: 2022-09-13

## 2022-09-13 ENCOUNTER — OFFICE VISIT (OUTPATIENT)
Dept: ORTHOPEDICS | Facility: CLINIC | Age: 52
End: 2022-09-13
Payer: COMMERCIAL

## 2022-09-13 ENCOUNTER — HOSPITAL ENCOUNTER (OUTPATIENT)
Dept: RADIOLOGY | Facility: HOSPITAL | Age: 52
Discharge: HOME | End: 2022-09-13
Attending: ORTHOPAEDIC SURGERY
Payer: COMMERCIAL

## 2022-09-13 VITALS
HEART RATE: 86 BPM | WEIGHT: 172.38 LBS | HEIGHT: 70 IN | BODY MASS INDEX: 24.68 KG/M2 | SYSTOLIC BLOOD PRESSURE: 106 MMHG | DIASTOLIC BLOOD PRESSURE: 67 MMHG

## 2022-09-13 DIAGNOSIS — T84.89XD ACL GRAFT TEAR, SUBSEQUENT ENCOUNTER: ICD-10-CM

## 2022-09-13 DIAGNOSIS — S83.251A BUCKET-HANDLE TEAR OF LATERAL MENISCUS OF RIGHT KNEE AS CURRENT INJURY, INITIAL ENCOUNTER: ICD-10-CM

## 2022-09-13 DIAGNOSIS — T84.89XD ACL GRAFT TEAR, SUBSEQUENT ENCOUNTER: Primary | ICD-10-CM

## 2022-09-13 DIAGNOSIS — M12.561 TRAUMATIC ARTHRITIS OF RIGHT KNEE: ICD-10-CM

## 2022-09-13 PROCEDURE — 3078F PR MOST RECENT DIASTOLIC BLOOD PRESSURE < 80 MM HG: ICD-10-PCS | Mod: CPTII,S$GLB,, | Performed by: ORTHOPAEDIC SURGERY

## 2022-09-13 PROCEDURE — 73721 MRI KNEE WITHOUT CONTRAST RIGHT: ICD-10-PCS | Mod: 26,RT,, | Performed by: RADIOLOGY

## 2022-09-13 PROCEDURE — 3078F DIAST BP <80 MM HG: CPT | Mod: CPTII,S$GLB,, | Performed by: ORTHOPAEDIC SURGERY

## 2022-09-13 PROCEDURE — 73721 MRI JNT OF LWR EXTRE W/O DYE: CPT | Mod: 26,RT,, | Performed by: RADIOLOGY

## 2022-09-13 PROCEDURE — 3008F BODY MASS INDEX DOCD: CPT | Mod: CPTII,S$GLB,, | Performed by: ORTHOPAEDIC SURGERY

## 2022-09-13 PROCEDURE — 99999 PR PBB SHADOW E&M-EST. PATIENT-LVL IV: ICD-10-PCS | Mod: PBBFAC,,, | Performed by: ORTHOPAEDIC SURGERY

## 2022-09-13 PROCEDURE — 99214 OFFICE O/P EST MOD 30 MIN: CPT | Mod: S$GLB,,, | Performed by: ORTHOPAEDIC SURGERY

## 2022-09-13 PROCEDURE — 1160F RVW MEDS BY RX/DR IN RCRD: CPT | Mod: CPTII,S$GLB,, | Performed by: ORTHOPAEDIC SURGERY

## 2022-09-13 PROCEDURE — 3074F SYST BP LT 130 MM HG: CPT | Mod: CPTII,S$GLB,, | Performed by: ORTHOPAEDIC SURGERY

## 2022-09-13 PROCEDURE — 3074F PR MOST RECENT SYSTOLIC BLOOD PRESSURE < 130 MM HG: ICD-10-PCS | Mod: CPTII,S$GLB,, | Performed by: ORTHOPAEDIC SURGERY

## 2022-09-13 PROCEDURE — 1159F PR MEDICATION LIST DOCUMENTED IN MEDICAL RECORD: ICD-10-PCS | Mod: CPTII,S$GLB,, | Performed by: ORTHOPAEDIC SURGERY

## 2022-09-13 PROCEDURE — 3008F PR BODY MASS INDEX (BMI) DOCUMENTED: ICD-10-PCS | Mod: CPTII,S$GLB,, | Performed by: ORTHOPAEDIC SURGERY

## 2022-09-13 PROCEDURE — 1160F PR REVIEW ALL MEDS BY PRESCRIBER/CLIN PHARMACIST DOCUMENTED: ICD-10-PCS | Mod: CPTII,S$GLB,, | Performed by: ORTHOPAEDIC SURGERY

## 2022-09-13 PROCEDURE — 1159F MED LIST DOCD IN RCRD: CPT | Mod: CPTII,S$GLB,, | Performed by: ORTHOPAEDIC SURGERY

## 2022-09-13 PROCEDURE — 73721 MRI JNT OF LWR EXTRE W/O DYE: CPT | Mod: TC,PO,RT

## 2022-09-13 PROCEDURE — 99214 PR OFFICE/OUTPT VISIT, EST, LEVL IV, 30-39 MIN: ICD-10-PCS | Mod: S$GLB,,, | Performed by: ORTHOPAEDIC SURGERY

## 2022-09-13 PROCEDURE — 99999 PR PBB SHADOW E&M-EST. PATIENT-LVL IV: CPT | Mod: PBBFAC,,, | Performed by: ORTHOPAEDIC SURGERY

## 2022-09-13 RX ORDER — SODIUM CHLORIDE 9 MG/ML
INJECTION, SOLUTION INTRAVENOUS CONTINUOUS
Status: CANCELLED | OUTPATIENT
Start: 2022-09-13

## 2022-09-13 RX ORDER — CEFAZOLIN SODIUM 2 G/50ML
2 SOLUTION INTRAVENOUS
Status: CANCELLED | OUTPATIENT
Start: 2022-09-13

## 2022-09-13 NOTE — TELEPHONE ENCOUNTER
The patient underwent an MRI scan of the right knee today.  I spoke to her on the phone.  She has displaced bucket-handle lateral meniscus tear.  I recommended urgent surgery to include ACL reconstruction and lateral meniscus treatment as indicated, most likely partial lateral meniscectomy

## 2022-09-13 NOTE — PROGRESS NOTES
Patient ID:   Liaz Byrne is a 51 y.o. female.    Chief Complaint:   Follow-up evaluation for right ACL deficient knee s/p partial meniscectomy & bone grafting     HPI:   The patient is returning today for evaluation of her right knee.  She reports injuring the right knee over this past weekend.  She was participating in sexual intercourse when she felt her knee pop out of place.  She states that she has had this feeling before however she was unable to pop the knee back in the place.  She presented to the [facility] Emergency Department.  She underwent plain x-rays and a CT scan.  She reports having significant pain.  She states that she has never had this allowed amount of pain in the knee.     Medications:    Current Outpatient Medications:     HYDROcodone-acetaminophen (NORCO) 5-325 mg per tablet, Take 1 tablet by mouth every 4 (four) hours as needed for Pain., Disp: 18 tablet, Rfl: 0    meloxicam (MOBIC) 7.5 MG tablet, Take 7.5-15 mg by mouth daily as needed., Disp: , Rfl:     mometasone (ELOCON) 0.1 % solution, Apply topically 2 (two) times daily., Disp: , Rfl:     ondansetron (ZOFRAN) 8 MG tablet, Take 1 tablet (8 mg total) by mouth every 12 (twelve) hours as needed for Nausea. (Patient not taking: Reported on 8/27/2022), Disp: 24 tablet, Rfl: 0    oxyCODONE-acetaminophen (PERCOCET)  mg per tablet, Take 1 tablet by mouth every 6 (six) hours as needed for Pain. (Patient not taking: Reported on 8/27/2022), Disp: 28 tablet, Rfl: 0    sertraline (ZOLOFT) 50 MG tablet, Take 50 mg by mouth once daily., Disp: , Rfl:     Allergies:  Review of patient's allergies indicates:  No Known Allergies    Past Medical History:  No past medical history on file.     Past Surgical History:  Past Surgical History:   Procedure Laterality Date    KNEE ARTHROSCOPY W/ MENISCECTOMY Right 6/2/2022    Procedure: Right knee arthroscopy, partial meniscectomy, bone grafting of old ACL tunnels;  Surgeon: Josiah Butler MD;   Location: [facility] OR;  Service: Orthopedics;  Laterality: Right;  Linvatec ACL instrumentation, Cloward bone graft dowels       Social History:  Social History     Occupational History    Not on file   Tobacco Use    Smoking status: Former    Smokeless tobacco: Never   Substance and Sexual Activity    Alcohol use: Yes    Drug use: Never    Sexual activity: Yes       Family History:  No family history on file.     ROS:  Review of Systems   Musculoskeletal:  Positive for joint pain, joint swelling, myalgias and stiffness.   All other systems reviewed and are negative.    Vitals:  There were no vitals taken for this visit.    Physical Examination:  Comprehensive Orthopaedic Musculoskeletal Exam    General      Constitutional: appears stated age, well-developed and well-nourished    Scleral icterus: no    Labored breathing: no    Psychiatric: normal mood and affect and no acute distress    Neurological: alert and oriented x3    Skin: intact    Lymphadenopathy: none   Ortho Exam     Right knee exam:  There is an effusion.  The knee is very tender along the lateral joint line.  She has a difficult time extending the knee due to pain.  Passively, I can get the knee to about -5 degrees of extension.  The knee feels better in flexion.  No medial or lateral laxity.  Lachman's is 2B.  Negative posterior drawer.    Imaging:  I have independently reviewed the following imaging studies performed at [facility]:    CT Knee Without Contrast Right  Narrative: EXAMINATION:  CT KNEE WITHOUT CONTRAST RIGHT    CLINICAL HISTORY:  Knee trauma, occult fracture suspected, xray done;    TECHNIQUE:  0.625 mm axial images were obtained through the right knee without the use of contrast.  Coronal and sagittal reformats were performed.    COMPARISON:  Radiograph 09/11/2014.  MRI 04/28/2022.    FINDINGS:  Postoperative change of ACL reconstruction and recent bone grafting of expanded femoral and tibial tunnels.  No acute displaced fracture.  No  suspicious lytic or blastic lesions.  There are tibiofemoral marginal osteophytes.  Mild medial tibiofemoral cartilage space narrowing.  No subluxation or dislocation.  Trace suprapatellar joint fluid.  Visualized musculature demonstrates normal bulk.  Subcutaneous soft tissues are within normal limits.  Impression: 1. No acute displaced fractures.  2. Remote postoperative change of ACL reconstruction with recent bone grafting of the expanded femoral and tibial tunnels.    Electronically signed by: Aleksandr Ibanez MD  Date:    09/11/2022  Time:    15:35  X-Ray Knee 3 View Right  Narrative: EXAMINATION:  XR KNEE 3 VIEW RIGHT    CLINICAL HISTORY:  Pain, unspecified    TECHNIQUE:  AP, lateral, and Merchant views of the right knee were performed.    COMPARISON:  Prior dated 03/29/22 and MRI of the knee dated 04/28/2022    FINDINGS:  There is moderate medial compartment joint space narrowing and tricompartmental marginal osteophytosis.  There is postsurgical change of the femur and tibia similar to prior exam suggesting remote ACL repair.  No joint effusion.  No acute fracture.  Impression: 1. Stable postsurgical change of the knee  2. Moderate osteoarthritis    Electronically signed by: Ewa Aragon MD  Date:    09/11/2022  Time:    12:47    Assessment:  1. ACL graft tear, subsequent encounter    2. Traumatic arthritis of right knee      Plan:  I have reviewed the findings in detail with the patient.  Suspect that she has a bucket-handle lateral meniscus tear blocking her motion.  Also discussed that she may have had a large pivot shift which resulted in a significant lateral compartment contusion.  I recommended she undergo MRI scan.  If she does indeed have a bucket-handle tear that is displaced, I would recommend proceeding with urgent ACL reconstruction and lateral meniscus treatment as indicated.  If no bucket-handle tear, she will need to get back in physical therapy and work on regaining her motion.     No  follow-ups on file.

## 2022-09-14 ENCOUNTER — ANESTHESIA EVENT (OUTPATIENT)
Dept: SURGERY | Facility: HOSPITAL | Age: 52
End: 2022-09-14
Payer: COMMERCIAL

## 2022-09-15 ENCOUNTER — ANESTHESIA (OUTPATIENT)
Dept: SURGERY | Facility: HOSPITAL | Age: 52
End: 2022-09-15
Payer: COMMERCIAL

## 2022-09-15 ENCOUNTER — HOSPITAL ENCOUNTER (OUTPATIENT)
Facility: HOSPITAL | Age: 52
Discharge: HOME | End: 2022-09-15
Attending: ORTHOPAEDIC SURGERY | Admitting: ORTHOPAEDIC SURGERY
Payer: COMMERCIAL

## 2022-09-15 VITALS
TEMPERATURE: 98 F | SYSTOLIC BLOOD PRESSURE: 105 MMHG | RESPIRATION RATE: 20 BRPM | BODY MASS INDEX: 24.62 KG/M2 | WEIGHT: 172 LBS | DIASTOLIC BLOOD PRESSURE: 59 MMHG | HEART RATE: 68 BPM | OXYGEN SATURATION: 100 % | HEIGHT: 70 IN

## 2022-09-15 DIAGNOSIS — M12.561 TRAUMATIC ARTHRITIS OF RIGHT KNEE: ICD-10-CM

## 2022-09-15 DIAGNOSIS — T84.89XD ACL GRAFT TEAR, SUBSEQUENT ENCOUNTER: ICD-10-CM

## 2022-09-15 DIAGNOSIS — S83.251A BUCKET-HANDLE TEAR OF LATERAL MENISCUS OF RIGHT KNEE AS CURRENT INJURY, INITIAL ENCOUNTER: ICD-10-CM

## 2022-09-15 DIAGNOSIS — T84.89XD ACL GRAFT TEAR, SUBSEQUENT ENCOUNTER: Primary | ICD-10-CM

## 2022-09-15 LAB
B-HCG UR QL: NEGATIVE
CTP QC/QA: YES

## 2022-09-15 PROCEDURE — 29881 PR KNEE SCOPE SINGLE MENISECECTOMY: ICD-10-PCS | Mod: 51,RT,, | Performed by: ORTHOPAEDIC SURGERY

## 2022-09-15 PROCEDURE — 37000008 HC ANESTHESIA 1ST 15 MINUTES: Performed by: ORTHOPAEDIC SURGERY

## 2022-09-15 PROCEDURE — 63600175 PHARM REV CODE 636 W HCPCS: Performed by: STUDENT IN AN ORGANIZED HEALTH CARE EDUCATION/TRAINING PROGRAM

## 2022-09-15 PROCEDURE — 63600175 PHARM REV CODE 636 W HCPCS: Performed by: ORTHOPAEDIC SURGERY

## 2022-09-15 PROCEDURE — 71000015 HC POSTOP RECOV 1ST HR: Performed by: ORTHOPAEDIC SURGERY

## 2022-09-15 PROCEDURE — C1713 ANCHOR/SCREW BN/BN,TIS/BN: HCPCS | Performed by: ORTHOPAEDIC SURGERY

## 2022-09-15 PROCEDURE — 27201423 OPTIME MED/SURG SUP & DEVICES STERILE SUPPLY: Performed by: ORTHOPAEDIC SURGERY

## 2022-09-15 PROCEDURE — 76942 ECHO GUIDE FOR BIOPSY: CPT | Performed by: STUDENT IN AN ORGANIZED HEALTH CARE EDUCATION/TRAINING PROGRAM

## 2022-09-15 PROCEDURE — 37000009 HC ANESTHESIA EA ADD 15 MINS: Performed by: ORTHOPAEDIC SURGERY

## 2022-09-15 PROCEDURE — 27800903 OPTIME MED/SURG SUP & DEVICES OTHER IMPLANTS: Performed by: ORTHOPAEDIC SURGERY

## 2022-09-15 PROCEDURE — 25000003 PHARM REV CODE 250: Performed by: NURSE ANESTHETIST, CERTIFIED REGISTERED

## 2022-09-15 PROCEDURE — C1769 GUIDE WIRE: HCPCS | Performed by: ORTHOPAEDIC SURGERY

## 2022-09-15 PROCEDURE — 36000711: Performed by: ORTHOPAEDIC SURGERY

## 2022-09-15 PROCEDURE — 81025 URINE PREGNANCY TEST: CPT | Performed by: ORTHOPAEDIC SURGERY

## 2022-09-15 PROCEDURE — 71000033 HC RECOVERY, INTIAL HOUR: Performed by: ORTHOPAEDIC SURGERY

## 2022-09-15 PROCEDURE — 25000003 PHARM REV CODE 250: Performed by: STUDENT IN AN ORGANIZED HEALTH CARE EDUCATION/TRAINING PROGRAM

## 2022-09-15 PROCEDURE — 36000710: Performed by: ORTHOPAEDIC SURGERY

## 2022-09-15 PROCEDURE — 63600175 PHARM REV CODE 636 W HCPCS: Performed by: NURSE ANESTHETIST, CERTIFIED REGISTERED

## 2022-09-15 PROCEDURE — 71000016 HC POSTOP RECOV ADDL HR: Performed by: ORTHOPAEDIC SURGERY

## 2022-09-15 PROCEDURE — 29881 ARTHRS KNE SRG MNISECTMY M/L: CPT | Mod: 51,RT,, | Performed by: ORTHOPAEDIC SURGERY

## 2022-09-15 PROCEDURE — 29888 PR KNEE SCOPE,AID ANT CRUCIATE REPAIR: ICD-10-PCS | Mod: RT,,, | Performed by: ORTHOPAEDIC SURGERY

## 2022-09-15 PROCEDURE — 29888 ARTHRS AID ACL RPR/AGMNTJ: CPT | Mod: RT,,, | Performed by: ORTHOPAEDIC SURGERY

## 2022-09-15 DEVICE — GUIDE FEMORAL BULLSEYE END: Type: IMPLANTABLE DEVICE | Site: KNEE | Status: FUNCTIONAL

## 2022-09-15 DEVICE — SCREW BONE MATRYX 8X25MM: Type: IMPLANTABLE DEVICE | Site: KNEE | Status: FUNCTIONAL

## 2022-09-15 DEVICE — SCREW BONE MATRYX 7X25MM: Type: IMPLANTABLE DEVICE | Site: KNEE | Status: FUNCTIONAL

## 2022-09-15 DEVICE — GUIDE GRAFTMAX XACTPIN FLEX: Type: IMPLANTABLE DEVICE | Site: KNEE | Status: FUNCTIONAL

## 2022-09-15 DEVICE — IMPLANTABLE DEVICE: Type: IMPLANTABLE DEVICE | Site: KNEE | Status: FUNCTIONAL

## 2022-09-15 RX ORDER — SODIUM CHLORIDE 9 MG/ML
INJECTION, SOLUTION INTRAVENOUS CONTINUOUS
Status: DISCONTINUED | OUTPATIENT
Start: 2022-09-15 | End: 2022-09-15 | Stop reason: HOSPADM

## 2022-09-15 RX ORDER — BUPIVACAINE HYDROCHLORIDE AND EPINEPHRINE 5; 5 MG/ML; UG/ML
INJECTION, SOLUTION EPIDURAL; INTRACAUDAL; PERINEURAL
Status: DISCONTINUED | OUTPATIENT
Start: 2022-09-15 | End: 2022-09-15

## 2022-09-15 RX ORDER — SODIUM CHLORIDE 0.9 % (FLUSH) 0.9 %
10 SYRINGE (ML) INJECTION
Status: DISCONTINUED | OUTPATIENT
Start: 2022-09-15 | End: 2022-09-15 | Stop reason: HOSPADM

## 2022-09-15 RX ORDER — ONDANSETRON HYDROCHLORIDE 2 MG/ML
INJECTION, SOLUTION INTRAMUSCULAR; INTRAVENOUS
Status: DISCONTINUED | OUTPATIENT
Start: 2022-09-15 | End: 2022-09-15

## 2022-09-15 RX ORDER — SUCCINYLCHOLINE CHLORIDE 20 MG/ML
INJECTION INTRAMUSCULAR; INTRAVENOUS
Status: DISCONTINUED | OUTPATIENT
Start: 2022-09-15 | End: 2022-09-15

## 2022-09-15 RX ORDER — DEXAMETHASONE SODIUM PHOSPHATE 4 MG/ML
INJECTION, SOLUTION INTRA-ARTICULAR; INTRALESIONAL; INTRAMUSCULAR; INTRAVENOUS; SOFT TISSUE
Status: DISCONTINUED | OUTPATIENT
Start: 2022-09-15 | End: 2022-09-15

## 2022-09-15 RX ORDER — MIDAZOLAM HYDROCHLORIDE 1 MG/ML
INJECTION INTRAMUSCULAR; INTRAVENOUS
Status: DISCONTINUED | OUTPATIENT
Start: 2022-09-15 | End: 2022-09-15

## 2022-09-15 RX ORDER — LIDOCAINE HCL/PF 100 MG/5ML
SYRINGE (ML) INTRAVENOUS
Status: DISCONTINUED | OUTPATIENT
Start: 2022-09-15 | End: 2022-09-15

## 2022-09-15 RX ORDER — CEPHALEXIN 500 MG/1
500 CAPSULE ORAL EVERY 6 HOURS
Qty: 2 CAPSULE | Refills: 0 | Status: SHIPPED | OUTPATIENT
Start: 2022-09-15 | End: 2022-09-16

## 2022-09-15 RX ORDER — OXYCODONE AND ACETAMINOPHEN 5; 325 MG/1; MG/1
1 TABLET ORAL EVERY 4 HOURS PRN
Qty: 28 TABLET | Refills: 0 | Status: SHIPPED | OUTPATIENT
Start: 2022-09-15 | End: 2022-09-27

## 2022-09-15 RX ORDER — PHENYLEPHRINE HYDROCHLORIDE 10 MG/ML
INJECTION INTRAVENOUS
Status: DISCONTINUED | OUTPATIENT
Start: 2022-09-15 | End: 2022-09-15

## 2022-09-15 RX ORDER — EPINEPHRINE 1 MG/ML
INJECTION, SOLUTION INTRACARDIAC; INTRAMUSCULAR; INTRAVENOUS; SUBCUTANEOUS
Status: DISCONTINUED | OUTPATIENT
Start: 2022-09-15 | End: 2022-09-15 | Stop reason: HOSPADM

## 2022-09-15 RX ORDER — OXYCODONE HYDROCHLORIDE 5 MG/1
5 TABLET ORAL
Status: DISCONTINUED | OUTPATIENT
Start: 2022-09-15 | End: 2022-09-15 | Stop reason: HOSPADM

## 2022-09-15 RX ORDER — ONDANSETRON 4 MG/1
4 TABLET, FILM COATED ORAL 2 TIMES DAILY
Qty: 10 TABLET | Refills: 0 | Status: SHIPPED | OUTPATIENT
Start: 2022-09-15 | End: 2022-09-27

## 2022-09-15 RX ORDER — HYDROMORPHONE HYDROCHLORIDE 2 MG/ML
0.5 INJECTION, SOLUTION INTRAMUSCULAR; INTRAVENOUS; SUBCUTANEOUS EVERY 5 MIN PRN
Status: DISCONTINUED | OUTPATIENT
Start: 2022-09-15 | End: 2022-09-15 | Stop reason: HOSPADM

## 2022-09-15 RX ORDER — EPHEDRINE SULFATE 50 MG/ML
INJECTION, SOLUTION INTRAVENOUS
Status: DISCONTINUED | OUTPATIENT
Start: 2022-09-15 | End: 2022-09-15

## 2022-09-15 RX ORDER — PROCHLORPERAZINE EDISYLATE 5 MG/ML
5 INJECTION INTRAMUSCULAR; INTRAVENOUS EVERY 30 MIN PRN
Status: DISCONTINUED | OUTPATIENT
Start: 2022-09-15 | End: 2022-09-15 | Stop reason: HOSPADM

## 2022-09-15 RX ORDER — DEXMEDETOMIDINE HYDROCHLORIDE 100 UG/ML
INJECTION, SOLUTION INTRAVENOUS
Status: DISCONTINUED | OUTPATIENT
Start: 2022-09-15 | End: 2022-09-15

## 2022-09-15 RX ORDER — CEFAZOLIN SODIUM 2 G/50ML
2 SOLUTION INTRAVENOUS
Status: COMPLETED | OUTPATIENT
Start: 2022-09-15 | End: 2022-09-15

## 2022-09-15 RX ORDER — FENTANYL CITRATE 50 UG/ML
INJECTION, SOLUTION INTRAMUSCULAR; INTRAVENOUS
Status: DISCONTINUED | OUTPATIENT
Start: 2022-09-15 | End: 2022-09-15

## 2022-09-15 RX ORDER — ROCURONIUM BROMIDE 10 MG/ML
INJECTION, SOLUTION INTRAVENOUS
Status: DISCONTINUED | OUTPATIENT
Start: 2022-09-15 | End: 2022-09-15

## 2022-09-15 RX ORDER — PROPOFOL 10 MG/ML
VIAL (ML) INTRAVENOUS
Status: DISCONTINUED | OUTPATIENT
Start: 2022-09-15 | End: 2022-09-15

## 2022-09-15 RX ADMIN — MIDAZOLAM HYDROCHLORIDE 3 MG: 1 INJECTION, SOLUTION INTRAMUSCULAR; INTRAVENOUS at 11:09

## 2022-09-15 RX ADMIN — DEXMEDETOMIDINE HYDROCHLORIDE 50 MCG: 100 INJECTION, SOLUTION, CONCENTRATE INTRAVENOUS at 11:09

## 2022-09-15 RX ADMIN — FENTANYL CITRATE 50 MCG: 50 INJECTION, SOLUTION INTRAMUSCULAR; INTRAVENOUS at 12:09

## 2022-09-15 RX ADMIN — ONDANSETRON 8 MG: 2 INJECTION, SOLUTION INTRAMUSCULAR; INTRAVENOUS at 02:09

## 2022-09-15 RX ADMIN — CEFAZOLIN SODIUM 2 G: 2 SOLUTION INTRAVENOUS at 12:09

## 2022-09-15 RX ADMIN — EPHEDRINE SULFATE 10 MG: 50 INJECTION, SOLUTION INTRAMUSCULAR; INTRAVENOUS; SUBCUTANEOUS at 01:09

## 2022-09-15 RX ADMIN — OXYCODONE 5 MG: 5 TABLET ORAL at 04:09

## 2022-09-15 RX ADMIN — SUCCINYLCHOLINE CHLORIDE 120 MG: 20 INJECTION, SOLUTION INTRAMUSCULAR; INTRAVENOUS at 12:09

## 2022-09-15 RX ADMIN — EPHEDRINE SULFATE 5 MG: 50 INJECTION, SOLUTION INTRAMUSCULAR; INTRAVENOUS; SUBCUTANEOUS at 12:09

## 2022-09-15 RX ADMIN — SODIUM CHLORIDE, SODIUM LACTATE, POTASSIUM CHLORIDE, AND CALCIUM CHLORIDE: .6; .31; .03; .02 INJECTION, SOLUTION INTRAVENOUS at 12:09

## 2022-09-15 RX ADMIN — PROPOFOL 130 MG: 10 INJECTION, EMULSION INTRAVENOUS at 12:09

## 2022-09-15 RX ADMIN — DEXAMETHASONE SODIUM PHOSPHATE 8 MG: 4 INJECTION, SOLUTION INTRA-ARTICULAR; INTRALESIONAL; INTRAMUSCULAR; INTRAVENOUS; SOFT TISSUE at 12:09

## 2022-09-15 RX ADMIN — PHENYLEPHRINE HYDROCHLORIDE 100 MCG: 10 INJECTION INTRAVENOUS at 12:09

## 2022-09-15 RX ADMIN — GLYCOPYRROLATE 0.2 MG: 0.2 INJECTION, SOLUTION INTRAMUSCULAR; INTRAVITREAL at 12:09

## 2022-09-15 RX ADMIN — ROCURONIUM BROMIDE 5 MG: 10 INJECTION, SOLUTION INTRAVENOUS at 12:09

## 2022-09-15 RX ADMIN — FENTANYL CITRATE 150 MCG: 50 INJECTION, SOLUTION INTRAMUSCULAR; INTRAVENOUS at 12:09

## 2022-09-15 RX ADMIN — LIDOCAINE HYDROCHLORIDE 30 MG: 20 INJECTION, SOLUTION INTRAVENOUS at 12:09

## 2022-09-15 RX ADMIN — BUPIVACAINE HYDROCHLORIDE AND EPINEPHRINE 25 MG: 5; 5 INJECTION, SOLUTION EPIDURAL; INTRACAUDAL; PERINEURAL at 11:09

## 2022-09-15 RX ADMIN — FENTANYL CITRATE 50 MCG: 50 INJECTION, SOLUTION INTRAMUSCULAR; INTRAVENOUS at 02:09

## 2022-09-15 RX ADMIN — SODIUM CHLORIDE, SODIUM LACTATE, POTASSIUM CHLORIDE, AND CALCIUM CHLORIDE: .6; .31; .03; .02 INJECTION, SOLUTION INTRAVENOUS at 11:09

## 2022-09-15 NOTE — PLAN OF CARE
Discharge criteria met, voicing desire to go home. Discharge instructions given to patient & mother,verbalized understanding. Discharge home via wheelchair in care of her mother.

## 2022-09-15 NOTE — ANESTHESIA PROCEDURE NOTES
Peripheral Block    Patient location during procedure: pre-op   Block not for primary anesthetic.  Reason for block: at surgeon's request and post-op pain management   Post-op Pain Location: R knee   Start time: 9/15/2022 11:05 AM  Timeout: 9/15/2022 11:04 AM   End time: 9/15/2022 11:15 AM    Staffing  Authorizing Provider: Gilberto Heard MD  Performing Provider: Meme Mcmahon MD    Preanesthetic Checklist  Completed: patient identified, IV checked, site marked, risks and benefits discussed, surgical consent, monitors and equipment checked, pre-op evaluation and timeout performed  Peripheral Block  Patient position: supine  Prep: ChloraPrep  Patient monitoring: heart rate, continuous pulse ox and frequent blood pressure checks  Block type: I PACK  Laterality: right  Injection technique: single shot  Needle  Needle type: Echogenic   Needle gauge: 21 G  Needle length: 3.5 in  Needle localization: ultrasound guidance   -ultrasound image captured on disc.  Assessment  Injection assessment: negative aspiration, negative parasthesia and local visualized surrounding nerve  Paresthesia pain: none  Heart rate change: no  Slow fractionated injection: yes  Pain Tolerance: comfortable throughout block and no complaints      Additional Notes  Ultrasound placed on anterior medial thigh just above the patient's knee and used to identify the femur and the popliteal vessels. The needle was directed between the femur and the popliteal vessels, at this location 20 cc of LA was administered. Negative aspiration was performed prior to each 5 cc aliquot administered. LA used was 20 cc of 0.25 % bupivacaine with 1:200K epinephrine and 20 mcg of dexmetetomidine added.

## 2022-09-15 NOTE — ANESTHESIA PREPROCEDURE EVALUATION
09/15/2022  Liza Byrne is a 51 y.o., female for R ACL reconstruction       Pre-op Assessment    I have reviewed the Patient Summary Reports.     I have reviewed the Nursing Notes. I have reviewed the NPO Status.      Review of Systems  Hematology/Oncology:         -- Denies Anemia:   Cardiovascular:   Exercise tolerance: good  Denies Angina.  Denies WHEELER.    Pulmonary:   Denies Shortness of breath.    Renal/:   Denies Chronic Renal Disease.     Hepatic/GI:  Hepatic/GI Normal    Neurological:  Neurology Normal    Endocrine:  Endocrine Normal        Physical Exam  General: Cooperative, Oriented and Alert    Airway:  Mallampati: II   Mouth Opening: Normal  TM Distance: Normal  Tongue: Normal  Neck ROM: Normal ROM    Dental:  Intact    Chest/Lungs:  Clear to auscultation, Normal Respiratory Rate    Heart:  Rate: Normal  Rhythm: Regular Rhythm        Anesthesia Plan  Type of Anesthesia, risks & benefits discussed:    Anesthesia Type: Gen ETT, Regional  Intra-op Monitoring Plan: Standard ASA Monitors  Post Op Pain Control Plan: multimodal analgesia, IV/PO Opioids PRN and peripheral nerve block  Induction:  IV  Airway Plan: Direct  Informed Consent: Informed consent signed with the Patient and all parties understand the risks and agree with anesthesia plan.  All questions answered.   ASA Score: 2    Ready For Surgery From Anesthesia Perspective.     .    Lab Results   Component Value Date    WBC 4.90 04/05/2022    HGB 12.6 04/05/2022    HCT 37.6 04/05/2022     04/05/2022

## 2022-09-15 NOTE — TRANSFER OF CARE
"Anesthesia Transfer of Care Note    Patient: Liza Byrne    Procedure(s) Performed: Procedure(s) (LRB):  RECONSTRUCTION, KNEE, ACL, ARTHROSCOPIC (Right)    Patient location: PACU    Anesthesia Type: general    Transport from OR: Transported from OR on 6-10 L/min O2 by face mask with adequate spontaneous ventilation    Post pain: adequate analgesia    Post assessment: no apparent anesthetic complications    Post vital signs: stable    Level of consciousness: awake    Nausea/Vomiting: no nausea/vomiting    Complications: none    Transfer of care protocol was followed      Last vitals:   Visit Vitals  /60 (BP Location: Left arm, Patient Position: Lying)   Pulse (!) 56   Temp 37 °C (98.6 °F) (Skin)   Resp 16   Ht 5' 10" (1.778 m)   Wt 78 kg (172 lb)   SpO2 100%   BMI 24.68 kg/m²     "

## 2022-09-15 NOTE — DISCHARGE SUMMARY
[facility] Same Day Surgery Discharge Summary   Patient ID:  Liza Byrne  [id number]  51 y.o.  1970    Admit date: 9/15/2022    Discharge date:  9/15/22    Admitting Physician: Josiah Butler MD     Discharge Physician: Josiah Butler MD    Admission Diagnoses: ACL graft tear, subsequent encounter [T84.89XD]  Traumatic arthritis of right knee [M12.561]  Bucket-handle tear of lateral meniscus of right knee as current injury, initial encounter [S83.251A]     Final Diagnoses:    Principal Problem: ACL graft tear, subsequent encounter [T84.89XD]  Traumatic arthritis of right knee [M12.561]  Bucket-handle tear of lateral meniscus of right knee as current injury, initial encounter [S83.251A]     Admission Condition: good    Discharged Condition: good    Consults: Orthopedic Surgery    Hospital Course: Pt was admitted to same day surgery on 9/15/2022 for Right ACL revision recon and meniscus debridement. Pt underwent procedure, tolerated it well and without complication. Pt was brought to PACU and subsequently stepped down back to same day surgery. In same day, pt's pain was adequately controlled with PO pain medicine and pt met all criteria and was deemed stable for discharge. Pt was discharged to home with PO pain medicine, thorough wound care instructions, and appropriate L-ortho clinic follow up.     Physical Exam:  General: NAD, A&Ox3  Cardiac: RRR by PP  Pulm: Non-labored WOB  Abd: soft, non-tender non-distented  RLE: Sterile surgical dressings C/D/I  NVID    Disposition: Home or Self Care    @DISCHARGEMEDSLIST(<NOROUTINE> error)@     Activity: activity as tolerated  Diet: regular diet  Wound Care: keep wound clean and dry  Follow-up with surgery in two weeks     I have reviewed the notes, assessments, and/or procedures performed by Dr. Hicks, I concur with her/his documentation of Liza Byrne.

## 2022-09-15 NOTE — PLAN OF CARE
Patient has met PACU discharge criteria, VSS, pain well controlled. Family updated by phone. Released from PACU by Dr. Heard

## 2022-09-15 NOTE — ANESTHESIA PROCEDURE NOTES
Peripheral Block    Patient location during procedure: pre-op   Block not for primary anesthetic.  Reason for block: at surgeon's request and post-op pain management   Post-op Pain Location: R knee   Start time: 9/15/2022 11:05 AM  Timeout: 9/15/2022 11:04 AM   End time: 9/15/2022 11:14 AM    Staffing  Authorizing Provider: Gilberto Heard MD  Performing Provider: Meme Mcmahon MD    Preanesthetic Checklist  Completed: patient identified, IV checked, site marked, risks and benefits discussed, surgical consent, monitors and equipment checked, pre-op evaluation and timeout performed  Peripheral Block  Patient position: supine  Prep: ChloraPrep  Patient monitoring: heart rate, continuous pulse ox and frequent blood pressure checks  Block type: adductor canal  Laterality: right  Injection technique: single shot  Interspace: T5-6    Needle  Needle type: Echogenic   Needle gauge: 21 G  Needle length: 3.5 in  Needle localization: ultrasound guidance   -ultrasound image captured on disc.  Assessment  Injection assessment: negative aspiration, negative parasthesia and local visualized surrounding nerve  Paresthesia pain: none  Heart rate change: no  Slow fractionated injection: yes  Pain Tolerance: no complaints and comfortable throughout block      Additional Notes  Saphenous nerve, Femoral artery and vein, sartorius, vastus medialis and adductor yao identified under ultrasound. Area cleaned with chloraprep prior to insertion of stimuplex needle. Prior to local injection, aspiration negative for heme. Saphenous nerve surrounded by local anesthetic and visualized under ultrasound - tracked distally to see nerve with continuous local anesthetic coverage. No complaints of parasthesias or discomfort throughout block. Vitals monitored and stable throughout. LA used was 25 cc of 0.25 % bupivacaine with 1:200K epinephrine and 30 mcg of Dexmetetomidine added.

## 2022-09-15 NOTE — H&P
Josiah Butler MD  Physician  Specialty:  Orthopedic Surgery  Progress Notes     Signed  Encounter Date:  9/13/2022  Creation Time:  9/13/2022  8:00 AM                                                                                                                                                                                                                                                                                                                                                                                             Patient ID:   Liza Byrne is a 51 y.o. female.     Chief Complaint:   Follow-up evaluation for right ACL deficient knee s/p partial meniscectomy & bone grafting      HPI:   The patient is returning today for evaluation of her right knee.  She reports injuring the right knee over this past weekend.  She was participating in sexual intercourse when she felt her knee pop out of place.  She states that she has had this feeling before however she was unable to pop the knee back in the place.  She presented to the [facility].  She underwent plain x-rays and a CT scan.  She reports having significant pain.  She states that she has never had this allowed amount of pain in the knee.      Medications:     Current Outpatient Medications:     HYDROcodone-acetaminophen (NORCO) 5-325 mg per tablet, Take 1 tablet by mouth every 4 (four) hours as needed for Pain., Disp: 18 tablet, Rfl: 0    meloxicam (MOBIC) 7.5 MG tablet, Take 7.5-15 mg by mouth daily as needed., Disp: , Rfl:     mometasone (ELOCON) 0.1 % solution, Apply topically 2 (two) times daily., Disp: , Rfl:     ondansetron (ZOFRAN) 8 MG tablet, Take 1 tablet (8 mg total) by mouth every 12 (twelve) hours as needed for Nausea. (Patient not taking: Reported on 8/27/2022), Disp: 24 tablet, Rfl: 0    oxyCODONE-acetaminophen (PERCOCET)  mg per tablet, Take 1 tablet by mouth every 6 (six) hours as needed for  Pain. (Patient not taking: Reported on 8/27/2022), Disp: 28 tablet, Rfl: 0    sertraline (ZOLOFT) 50 MG tablet, Take 50 mg by mouth once daily., Disp: , Rfl:      Allergies:  Review of patient's allergies indicates:  No Known Allergies     Past Medical History:  No past medical history on file.      Past Surgical History:        Past Surgical History:   Procedure Laterality Date    KNEE ARTHROSCOPY W/ MENISCECTOMY Right 6/2/2022     Procedure: Right knee arthroscopy, partial meniscectomy, bone grafting of old ACL tunnels;  Surgeon: Josiah Butler MD;  Location: [facility];  Service: Orthopedics;  Laterality: Right;  Linvate ACL instrumentation, Cloward bone graft dowels         Social History:  Social History           Occupational History    Not on file   Tobacco Use    Smoking status: Former    Smokeless tobacco: Never   Substance and Sexual Activity    Alcohol use: Yes    Drug use: Never    Sexual activity: Yes         Family History:  No family history on file.      ROS:  Review of Systems   Musculoskeletal:  Positive for joint pain, joint swelling, myalgias and stiffness.   All other systems reviewed and are negative.     Vitals:  There were no vitals taken for this visit.     Physical Examination:  Comprehensive Orthopaedic Musculoskeletal Exam     General      Constitutional: appears stated age, well-developed and well-nourished    Scleral icterus: no    Labored breathing: no    Psychiatric: normal mood and affect and no acute distress    Neurological: alert and oriented x3    Skin: intact    Lymphadenopathy: none   Ortho Exam      Right knee exam:  There is an effusion.  The knee is very tender along the lateral joint line.  She has a difficult time extending the knee due to pain.  Passively, I can get the knee to about -5 degrees of extension.  The knee feels better in flexion.  No medial or lateral laxity.  Lachman's is 2B.  Negative posterior drawer.     Imaging:  I have independently reviewed the  following imaging studies performed at [facility]:     CT Knee Without Contrast Right  Narrative: EXAMINATION:  CT KNEE WITHOUT CONTRAST RIGHT     CLINICAL HISTORY:  Knee trauma, occult fracture suspected, xray done;     TECHNIQUE:  0.625 mm axial images were obtained through the right knee without the use of contrast.  Coronal and sagittal reformats were performed.     COMPARISON:  Radiograph 09/11/2014.  MRI 04/28/2022.     FINDINGS:  Postoperative change of ACL reconstruction and recent bone grafting of expanded femoral and tibial tunnels.  No acute displaced fracture.  No suspicious lytic or blastic lesions.  There are tibiofemoral marginal osteophytes.  Mild medial tibiofemoral cartilage space narrowing.  No subluxation or dislocation.  Trace suprapatellar joint fluid.  Visualized musculature demonstrates normal bulk.  Subcutaneous soft tissues are within normal limits.  Impression: 1. No acute displaced fractures.  2. Remote postoperative change of ACL reconstruction with recent bone grafting of the expanded femoral and tibial tunnels.     Electronically signed by:         Aleksandr Ibanez MD  Date:                                        09/11/2022  Time:                                       15:35  X-Ray Knee 3 View Right  Narrative: EXAMINATION:  XR KNEE 3 VIEW RIGHT     CLINICAL HISTORY:  Pain, unspecified     TECHNIQUE:  AP, lateral, and Merchant views of the right knee were performed.     COMPARISON:  Prior dated 03/29/22 and MRI of the knee dated 04/28/2022     FINDINGS:  There is moderate medial compartment joint space narrowing and tricompartmental marginal osteophytosis.  There is postsurgical change of the femur and tibia similar to prior exam suggesting remote ACL repair.  No joint effusion.  No acute fracture.  Impression: 1. Stable postsurgical change of the knee  2. Moderate osteoarthritis     Electronically signed by:         Ewa Aragon MD  Date:                                         09/11/2022  Time:                                       12:47     Assessment:  1. ACL graft tear, subsequent encounter    2. Traumatic arthritis of right knee       Plan:  I have reviewed the findings in detail with the patient.  Suspect that she has a bucket-handle lateral meniscus tear blocking her motion.  Also discussed that she may have had a large pivot shift which resulted in a significant lateral compartment contusion.  I recommended she undergo MRI scan.  If she does indeed have a bucket-handle tear that is displaced, I would recommend proceeding with urgent ACL reconstruction and lateral meniscus treatment as indicated.  If no bucket-handle tear, she will need to get back in physical therapy and work on regaining her motion.    I have reviewed the H&P. MRI scan shows a displaced bucket handle lateral meniscus tear and chronic ACL insufficiency. Plan today for right knee arthroscopy with ACL reconstruction using allograft, lateral meniscus treatment as indicated, and any other indicated procedures.

## 2022-09-15 NOTE — ANESTHESIA POSTPROCEDURE EVALUATION
Anesthesia Post Evaluation    Patient: Liza Byrne    Procedure(s) Performed: Procedure(s) (LRB):  RECONSTRUCTION, KNEE, ACL, ARTHROSCOPIC (Right)    Final Anesthesia Type: general      Patient location during evaluation: PACU  Patient participation: Yes- Able to Participate  Level of consciousness: awake and alert  Post-procedure vital signs: reviewed and stable  Pain management: adequate  Airway patency: patent    PONV status at discharge: No PONV  Anesthetic complications: no      Cardiovascular status: blood pressure returned to baseline and hemodynamically stable  Respiratory status: unassisted, spontaneous ventilation and room air  Hydration status: euvolemic  Follow-up not needed.          Vitals Value Taken Time   /62 09/15/22 1555   Temp 36.6 °C (97.9 °F) 09/15/22 1555   Pulse 68 09/15/22 1555   Resp 20 09/15/22 1618   SpO2 99 % 09/15/22 1555         Event Time   Out of Recovery 15:52:28         Pain/Sia Score: Pain Rating Prior to Med Admin: 4 (9/15/2022  4:18 PM)  Sia Score: 10 (9/15/2022  3:36 PM)

## 2022-09-16 NOTE — OP NOTE
Facility:  [facility]    Date of Surgery:  September 15, 2022    Surgeon:  Josiah Butler MD    First Assistant:  Catarino Hicks DO    Second Assistant:  Hima Abbasi MD    Anesthesia:  GETA + Adductor Canal    Pre-operative Diagnosis:  Right chronic ACL rupture  Right displaced bucket handle lateral meniscus tear  Right knee post-traumatic arthritis  Status post allograft bone placement expanded prior ACL reconstructive tunnels    Indication:  Improve pain and knee stability    Post-operative Diagnosis:  Right chronic ACL rupture  Right displaced bucket handle lateral meniscus tear  Right knee post-traumatic arthritis  Status post allograft bone placement expanded prior ACL reconstructive tunnels    Procedure:  Right arthroscopic-assisted ACL reconstruction with bone patellar tendon allograft  Right partial lateral meniscectomy  Right shaving chondroplasty trochlear, medial femoral condyle, and lateral femoral condyle    The patient was identified in the pre-operative holding area. The correct extremity was marked and written informed consent was verified. Anesthesia provided a pre-op adductor canal block. The patient was transferred to the OR. The patient was placed on the OR table. General anesthesia was administered. Examination of the knee revealed ROM 0-145, Lachmans 2B, positive pivot shift, negative posterior drawer, negative varus/valgus stress test. A tourniquet was placed on the thigh. The thigh was secured in an arthroscopic leg chamorro. The opposite extremity was positioned on a padded well leg chamorro.     The operative extremity was prepped and draped in the usual sterile fashion. A surgical timeout was performed to verify the correct extremely and administration of IV antibiotics withing 30 minutes of surgery start time.     A standard anterolateral portal was made. The arthroscopic was introduced into the patellofemoral compartment. Grade III changes were present on the  patella and Grade III/IV changes were present in the central trochlear groove. The scope was introduced into the lateral gutter. Chondral loose bodies were present and flushed out. The scope was advanced into the medial gutter. No loose bodies were present. The scope was advanced into the medial compartment. Using an outside in technique, the anteromedial portal incision was made. Exam of the medial meniscus revealed prior partial medial meniscectomy of the body and posterior horn. A rim of meniscus remained. Grade III/IV changes were present at the posteromedial aspect of the medial femoral condyle and tibia. The scope was advanced into the notch. The ACL was absent. The PCL was intact. The scope was advanced laterally and the displaced bucket handle was encountered. The meniscus was reduced. Using a basket biter and shaver, the bucket handle portion of the lateral meniscus was resected. The residual lateral meniscus included the body and the anterior horn. The posterior was resected.     The scope was positioned back into the notch. A notchplasty was performed. The prior bone graft plugs appeared healed at the femoral entrance. Through the anteromedial portal, a curved ACL guide was introduced. The guide was placed at the anatomic origin of the femoral wall, missing the old vertical tunnel completely. A flexible guidewire was passed through the femur and anterolateral thigh with the knee in hyperflexion. The guidewire position was excellent. Over the guidewire, a 10 mm flexible reamer was introduced and a 10 x 30 mm femoral socket was created. The back wall of the tunnel was inspected and probed and found to be intact. A passing suture was passed through the femoral tunnel using the flexible Beath pin. The previous anteromedial skin incision was opened. Dissection along the proximal medial tibial was performed. An ACL guide was placed through the AM portal with the tip at the insertion of the ACL footprint. A  guidepin was passed. Over the guidepin, a 10 mm reamer was used to make the tibial tunnel.     The passing suture was retrieved through the tibial tunnel. The graft was passed through the tibial and femoral tunnels. There was a graft tunnel length mismatch, therefore the femoral bone plug was recessed into the femoral tunnel for 5mm. The femoral bone plug was secured with a 7 x 25 mm biointerference screw with excellent fixation. The knee was cycled 15 times. No roof impingement was present. The knee was brought into full extension. The graft extruded 10 mm. The graft was secured on the tibia with a 8mm x 30 mm biointerference screw which had excellent fixation. The protruding graft was burred down. The wound was irrigated. The wound was closed with 2-0 vicryl interrupted suture. The skin was closed with a running 3-0 monocryl. The portal incisions were closed with 3-0 nylon suture.     A sterile dressing was placed. A hinge dknee brace locked in full extension was applied. The patient was awakened and transferred to the PACU in stable condition.     EBL:  Less than 50 cc    Drains:  None    Complications:  None known

## 2022-09-20 ENCOUNTER — TELEPHONE (OUTPATIENT)
Dept: ORTHOPEDICS | Facility: CLINIC | Age: 52
End: 2022-09-20
Payer: COMMERCIAL

## 2022-09-20 ENCOUNTER — NURSE TRIAGE (OUTPATIENT)
Dept: ADMINISTRATIVE | Facility: CLINIC | Age: 52
End: 2022-09-20
Payer: COMMERCIAL

## 2022-09-20 NOTE — TELEPHONE ENCOUNTER
ACL reconstruction on Thursday. Started with low grade fever today. 2 episodes of dizzy spells since this morning, both felt as if she would pass out, had to stop & sit until it subsides, one while at therapy.  Denies dizziness now. Feels achy all over, just not good. Fever currently 100.4.     Dispo-ER/UC now for eval. Callback with questions.     Reason for Disposition   Patient sounds very sick or weak to the triager    Additional Information   Negative: Sounds like a life-threatening emergency to the triager   Negative: [1] Widespread rash AND [2] bright red, sunburn-like   Negative: [1] SEVERE headache AND [2] after spinal (epidural) anesthesia   Negative: [1] Vomiting AND [2] persists > 4 hours   Negative: [1] Vomiting AND [2] abdomen looks much more swollen than usual   Negative: [1] Drinking very little AND [2] dehydration suspected (e.g., no urine > 12 hours, very dry mouth, very lightheaded)    Protocols used: Post-Op Symptoms and [id number]

## 2022-09-21 NOTE — TELEPHONE ENCOUNTER
I spoke to patient just now. RN Triage note came across my inbox this evening. Patient having a low grade fever. She is POD #5 s/p revision ACL reconstruction. She is not taking pain medication. Feels dizzy at times. Denies chest pain, SOB, redness, drainage. She is on ASA for DVT prophylaxis.     Instructed patient to monitor. Most like cause of low grade fever is atelectasis.     Reviewed red flags.     She will contact us if any worsening of symptoms.

## 2022-09-27 ENCOUNTER — OFFICE VISIT (OUTPATIENT)
Dept: ORTHOPEDICS | Facility: CLINIC | Age: 52
End: 2022-09-27
Payer: COMMERCIAL

## 2022-09-27 VITALS
HEIGHT: 70 IN | DIASTOLIC BLOOD PRESSURE: 71 MMHG | WEIGHT: 0.19 LBS | SYSTOLIC BLOOD PRESSURE: 123 MMHG | HEART RATE: 98 BPM | BODY MASS INDEX: 0.03 KG/M2

## 2022-09-27 DIAGNOSIS — Z98.890 S/P ACL RECONSTRUCTION: Primary | ICD-10-CM

## 2022-09-27 DIAGNOSIS — Z98.890 S/P LATERAL MENISCECTOMY OF RIGHT KNEE: ICD-10-CM

## 2022-09-27 PROCEDURE — 99024 POSTOP FOLLOW-UP VISIT: CPT | Mod: S$GLB,,, | Performed by: ORTHOPAEDIC SURGERY

## 2022-09-27 PROCEDURE — 99024 PR POST-OP FOLLOW-UP VISIT: ICD-10-PCS | Mod: S$GLB,,, | Performed by: ORTHOPAEDIC SURGERY

## 2022-09-27 PROCEDURE — 1159F MED LIST DOCD IN RCRD: CPT | Mod: CPTII,S$GLB,, | Performed by: ORTHOPAEDIC SURGERY

## 2022-09-27 PROCEDURE — 1160F PR REVIEW ALL MEDS BY PRESCRIBER/CLIN PHARMACIST DOCUMENTED: ICD-10-PCS | Mod: CPTII,S$GLB,, | Performed by: ORTHOPAEDIC SURGERY

## 2022-09-27 PROCEDURE — 3008F BODY MASS INDEX DOCD: CPT | Mod: CPTII,S$GLB,, | Performed by: ORTHOPAEDIC SURGERY

## 2022-09-27 PROCEDURE — 3078F DIAST BP <80 MM HG: CPT | Mod: CPTII,S$GLB,, | Performed by: ORTHOPAEDIC SURGERY

## 2022-09-27 PROCEDURE — 99999 PR PBB SHADOW E&M-EST. PATIENT-LVL III: CPT | Mod: PBBFAC,,, | Performed by: ORTHOPAEDIC SURGERY

## 2022-09-27 PROCEDURE — 3008F PR BODY MASS INDEX (BMI) DOCUMENTED: ICD-10-PCS | Mod: CPTII,S$GLB,, | Performed by: ORTHOPAEDIC SURGERY

## 2022-09-27 PROCEDURE — 1159F PR MEDICATION LIST DOCUMENTED IN MEDICAL RECORD: ICD-10-PCS | Mod: CPTII,S$GLB,, | Performed by: ORTHOPAEDIC SURGERY

## 2022-09-27 PROCEDURE — 99999 PR PBB SHADOW E&M-EST. PATIENT-LVL III: ICD-10-PCS | Mod: PBBFAC,,, | Performed by: ORTHOPAEDIC SURGERY

## 2022-09-27 PROCEDURE — 1160F RVW MEDS BY RX/DR IN RCRD: CPT | Mod: CPTII,S$GLB,, | Performed by: ORTHOPAEDIC SURGERY

## 2022-09-27 PROCEDURE — 3074F PR MOST RECENT SYSTOLIC BLOOD PRESSURE < 130 MM HG: ICD-10-PCS | Mod: CPTII,S$GLB,, | Performed by: ORTHOPAEDIC SURGERY

## 2022-09-27 PROCEDURE — 3074F SYST BP LT 130 MM HG: CPT | Mod: CPTII,S$GLB,, | Performed by: ORTHOPAEDIC SURGERY

## 2022-09-27 PROCEDURE — 3078F PR MOST RECENT DIASTOLIC BLOOD PRESSURE < 80 MM HG: ICD-10-PCS | Mod: CPTII,S$GLB,, | Performed by: ORTHOPAEDIC SURGERY

## 2022-09-27 NOTE — PROGRESS NOTES
"Patient ID:   Liza Byrne is a 51 y.o. female.    Chief Complaint:   POD #12 s/p right revision ACL reconstruction with allograft, partial lateral meniscectomy    HPI:   Patient is returning today for her 1st postoperative visit.  She reports pain in the right knee.    Medications:    Current Outpatient Medications:     mometasone (ELOCON) 0.1 % solution, Apply topically 2 (two) times daily., Disp: , Rfl:     sertraline (ZOLOFT) 50 MG tablet, Take 50 mg by mouth once daily., Disp: , Rfl:     Allergies:  Review of patient's allergies indicates:  No Known Allergies    Vitals:  /71 (BP Location: Left arm, Patient Position: Sitting, BP Method: Small (Automatic))   Pulse 98   Ht 5' 10" (1.778 m)   Wt (!) 0.076 kg (2.7 oz)   BMI 0.02 kg/m²     Physical Examination:  Ortho Exam   Right knee exam:  2+ effusion.  Skin incisions are clean, dry, and intact.  Range of motion is full extension to about 80°.    Assessment:  1. S/P ACL reconstruction    2. S/P lateral meniscectomy of right knee        Plan:  Given the large effusion, I recommended aspiration of the right knee.  Went ahead and did this today.  The right knee was prepped and draped in the usual sterile fashion.  Using an 18 gauge needle, approximately 50 cc of serosanguineous fluid was removed.  Patient tolerated the procedure without any difficulty.      Patient will continue with physical therapy.  She may weight bear as tolerated she will return in 1 month for repeat evaluation.       No follow-ups on file.          "

## 2022-11-01 ENCOUNTER — OFFICE VISIT (OUTPATIENT)
Dept: ORTHOPEDICS | Facility: CLINIC | Age: 52
End: 2022-11-01
Payer: COMMERCIAL

## 2022-11-01 VITALS
DIASTOLIC BLOOD PRESSURE: 77 MMHG | HEIGHT: 70 IN | HEART RATE: 75 BPM | WEIGHT: 162.06 LBS | SYSTOLIC BLOOD PRESSURE: 109 MMHG | BODY MASS INDEX: 23.2 KG/M2

## 2022-11-01 DIAGNOSIS — Z98.890 S/P ACL RECONSTRUCTION: Primary | ICD-10-CM

## 2022-11-01 DIAGNOSIS — Z98.890 S/P LATERAL MENISCECTOMY OF RIGHT KNEE: ICD-10-CM

## 2022-11-01 PROCEDURE — 1159F PR MEDICATION LIST DOCUMENTED IN MEDICAL RECORD: ICD-10-PCS | Mod: CPTII,S$GLB,, | Performed by: ORTHOPAEDIC SURGERY

## 2022-11-01 PROCEDURE — 99999 PR PBB SHADOW E&M-EST. PATIENT-LVL III: ICD-10-PCS | Mod: PBBFAC,,, | Performed by: ORTHOPAEDIC SURGERY

## 2022-11-01 PROCEDURE — 99999 PR PBB SHADOW E&M-EST. PATIENT-LVL III: CPT | Mod: PBBFAC,,, | Performed by: ORTHOPAEDIC SURGERY

## 2022-11-01 PROCEDURE — 99024 POSTOP FOLLOW-UP VISIT: CPT | Mod: S$GLB,,, | Performed by: ORTHOPAEDIC SURGERY

## 2022-11-01 PROCEDURE — 99024 PR POST-OP FOLLOW-UP VISIT: ICD-10-PCS | Mod: S$GLB,,, | Performed by: ORTHOPAEDIC SURGERY

## 2022-11-01 PROCEDURE — 1160F RVW MEDS BY RX/DR IN RCRD: CPT | Mod: CPTII,S$GLB,, | Performed by: ORTHOPAEDIC SURGERY

## 2022-11-01 PROCEDURE — 3078F PR MOST RECENT DIASTOLIC BLOOD PRESSURE < 80 MM HG: ICD-10-PCS | Mod: CPTII,S$GLB,, | Performed by: ORTHOPAEDIC SURGERY

## 2022-11-01 PROCEDURE — 3074F PR MOST RECENT SYSTOLIC BLOOD PRESSURE < 130 MM HG: ICD-10-PCS | Mod: CPTII,S$GLB,, | Performed by: ORTHOPAEDIC SURGERY

## 2022-11-01 PROCEDURE — 1160F PR REVIEW ALL MEDS BY PRESCRIBER/CLIN PHARMACIST DOCUMENTED: ICD-10-PCS | Mod: CPTII,S$GLB,, | Performed by: ORTHOPAEDIC SURGERY

## 2022-11-01 PROCEDURE — 3008F BODY MASS INDEX DOCD: CPT | Mod: CPTII,S$GLB,, | Performed by: ORTHOPAEDIC SURGERY

## 2022-11-01 PROCEDURE — 3008F PR BODY MASS INDEX (BMI) DOCUMENTED: ICD-10-PCS | Mod: CPTII,S$GLB,, | Performed by: ORTHOPAEDIC SURGERY

## 2022-11-01 PROCEDURE — 3074F SYST BP LT 130 MM HG: CPT | Mod: CPTII,S$GLB,, | Performed by: ORTHOPAEDIC SURGERY

## 2022-11-01 PROCEDURE — 1159F MED LIST DOCD IN RCRD: CPT | Mod: CPTII,S$GLB,, | Performed by: ORTHOPAEDIC SURGERY

## 2022-11-01 PROCEDURE — 3078F DIAST BP <80 MM HG: CPT | Mod: CPTII,S$GLB,, | Performed by: ORTHOPAEDIC SURGERY

## 2022-11-01 RX ORDER — VALACYCLOVIR HYDROCHLORIDE 500 MG/1
500 TABLET, FILM COATED ORAL 2 TIMES DAILY
COMMUNITY
Start: 2022-09-29

## 2022-11-01 RX ORDER — ACYCLOVIR 50 MG/G
CREAM TOPICAL
COMMUNITY
Start: 2022-10-06 | End: 2023-10-06

## 2022-11-01 NOTE — PROGRESS NOTES
"Patient ID:   Liza Byrne is a 52 y.o. female.    Chief Complaint:   6 w 5d s/p R revision ACLR with BTB allograft, partial lateral meniscectomy, shaving chondroplasty    HPI:   Mrs. Byrne is returning for evaluation.     Medications:    Current Outpatient Medications:     acyclovir 5% (ZOVIRAX) 5 % Crea, Apply sparingly at first sign of outbreak five times daily for 4 days, Disp: , Rfl:     mometasone (ELOCON) 0.1 % solution, Apply topically 2 (two) times daily., Disp: , Rfl:     sertraline (ZOLOFT) 50 MG tablet, Take 50 mg by mouth once daily., Disp: , Rfl:     valACYclovir (VALTREX) 500 MG tablet, Take 500 mg by mouth 2 (two) times daily., Disp: , Rfl:     Allergies:  Review of patient's allergies indicates:  No Known Allergies    Vitals:  /77 (BP Location: Left arm, Patient Position: Sitting, BP Method: Large (Automatic))   Pulse 75   Ht 5' 10" (1.778 m)   Wt 73.5 kg (162 lb 0.6 oz)   BMI 23.25 kg/m²     Physical Examination:  Ortho Exam   Right knee exam:  1+ effusion.   ROM: 0-130  Mild tenderness along medial and lateral joint line  Lachmans 1A    Assessment:  1. S/P ACL reconstruction    2. S/P lateral meniscectomy of right knee      Plan:  The patient is progressing well. I have advised continued PT. Follow-up in six weeks.       No follow-ups on file.          "

## 2022-11-04 ENCOUNTER — TELEPHONE (OUTPATIENT)
Dept: ORTHOPEDICS | Facility: CLINIC | Age: 52
End: 2022-11-04
Payer: COMMERCIAL

## 2022-11-05 LAB — PAP RECOMMENDATION EXT: NORMAL

## 2022-12-13 ENCOUNTER — HOSPITAL ENCOUNTER (OUTPATIENT)
Dept: RADIOLOGY | Facility: HOSPITAL | Age: 52
Discharge: HOME | End: 2022-12-13
Attending: ORTHOPAEDIC SURGERY
Payer: COMMERCIAL

## 2022-12-13 ENCOUNTER — OFFICE VISIT (OUTPATIENT)
Dept: ORTHOPEDICS | Facility: CLINIC | Age: 52
End: 2022-12-13
Payer: COMMERCIAL

## 2022-12-13 VITALS
DIASTOLIC BLOOD PRESSURE: 71 MMHG | HEIGHT: 70 IN | BODY MASS INDEX: 23.2 KG/M2 | WEIGHT: 162.06 LBS | HEART RATE: 67 BPM | SYSTOLIC BLOOD PRESSURE: 112 MMHG

## 2022-12-13 DIAGNOSIS — G89.29 CHRONIC LEFT SHOULDER PAIN: Primary | ICD-10-CM

## 2022-12-13 DIAGNOSIS — Z98.890 S/P ACL RECONSTRUCTION: ICD-10-CM

## 2022-12-13 DIAGNOSIS — Z98.890 S/P LATERAL MENISCECTOMY OF RIGHT KNEE: ICD-10-CM

## 2022-12-13 DIAGNOSIS — G89.29 CHRONIC LEFT SHOULDER PAIN: ICD-10-CM

## 2022-12-13 DIAGNOSIS — M25.512 CHRONIC LEFT SHOULDER PAIN: Primary | ICD-10-CM

## 2022-12-13 DIAGNOSIS — M25.512 CHRONIC LEFT SHOULDER PAIN: ICD-10-CM

## 2022-12-13 PROCEDURE — 99214 PR OFFICE/OUTPT VISIT, EST, LEVL IV, 30-39 MIN: ICD-10-PCS | Mod: 24,S$GLB,, | Performed by: ORTHOPAEDIC SURGERY

## 2022-12-13 PROCEDURE — 3008F PR BODY MASS INDEX (BMI) DOCUMENTED: ICD-10-PCS | Mod: CPTII,S$GLB,, | Performed by: ORTHOPAEDIC SURGERY

## 2022-12-13 PROCEDURE — 3074F PR MOST RECENT SYSTOLIC BLOOD PRESSURE < 130 MM HG: ICD-10-PCS | Mod: CPTII,S$GLB,, | Performed by: ORTHOPAEDIC SURGERY

## 2022-12-13 PROCEDURE — 3078F PR MOST RECENT DIASTOLIC BLOOD PRESSURE < 80 MM HG: ICD-10-PCS | Mod: CPTII,S$GLB,, | Performed by: ORTHOPAEDIC SURGERY

## 2022-12-13 PROCEDURE — 1160F RVW MEDS BY RX/DR IN RCRD: CPT | Mod: CPTII,S$GLB,, | Performed by: ORTHOPAEDIC SURGERY

## 2022-12-13 PROCEDURE — 1159F MED LIST DOCD IN RCRD: CPT | Mod: CPTII,S$GLB,, | Performed by: ORTHOPAEDIC SURGERY

## 2022-12-13 PROCEDURE — 73030 XR SHOULDER TRAUMA 3 VIEW LEFT: ICD-10-PCS | Mod: 26,LT,, | Performed by: RADIOLOGY

## 2022-12-13 PROCEDURE — 73030 X-RAY EXAM OF SHOULDER: CPT | Mod: 26,LT,, | Performed by: RADIOLOGY

## 2022-12-13 PROCEDURE — 3078F DIAST BP <80 MM HG: CPT | Mod: CPTII,S$GLB,, | Performed by: ORTHOPAEDIC SURGERY

## 2022-12-13 PROCEDURE — 3074F SYST BP LT 130 MM HG: CPT | Mod: CPTII,S$GLB,, | Performed by: ORTHOPAEDIC SURGERY

## 2022-12-13 PROCEDURE — 99214 OFFICE O/P EST MOD 30 MIN: CPT | Mod: 24,S$GLB,, | Performed by: ORTHOPAEDIC SURGERY

## 2022-12-13 PROCEDURE — 99999 PR PBB SHADOW E&M-EST. PATIENT-LVL IV: ICD-10-PCS | Mod: PBBFAC,,, | Performed by: ORTHOPAEDIC SURGERY

## 2022-12-13 PROCEDURE — 1160F PR REVIEW ALL MEDS BY PRESCRIBER/CLIN PHARMACIST DOCUMENTED: ICD-10-PCS | Mod: CPTII,S$GLB,, | Performed by: ORTHOPAEDIC SURGERY

## 2022-12-13 PROCEDURE — 73030 X-RAY EXAM OF SHOULDER: CPT | Mod: TC,PN,LT

## 2022-12-13 PROCEDURE — 99999 PR PBB SHADOW E&M-EST. PATIENT-LVL IV: CPT | Mod: PBBFAC,,, | Performed by: ORTHOPAEDIC SURGERY

## 2022-12-13 PROCEDURE — 3008F BODY MASS INDEX DOCD: CPT | Mod: CPTII,S$GLB,, | Performed by: ORTHOPAEDIC SURGERY

## 2022-12-13 PROCEDURE — 1159F PR MEDICATION LIST DOCUMENTED IN MEDICAL RECORD: ICD-10-PCS | Mod: CPTII,S$GLB,, | Performed by: ORTHOPAEDIC SURGERY

## 2022-12-13 NOTE — PROGRESS NOTES
"Patient ID:   Liza Byrne is a 52 y.o. female.    Chief Complaint:   1. Left shoulder pain   2. Approximately 3 months status post right revision ACL reconstruction, partial lateral meniscectomy, shaving chondroplasty    HPI:   The patient is returning today for evaluation of both her left shoulder and her right knee.  She has a history of bilateral shoulder pain worse on the left side.  The pain has been present since the summer of 2022.  It 1st started when she was reaching toward the back seat of her automobile.  She describes pain if she attempts to extend the arm.  She reports night pain.  The pain is present over the anterolateral shoulder and radiates into the arm.  She denies any pain past the elbow.  She denies any numbness or paresthesias.      In regards to her right knee, she reports that it is doing well.  She does report some "crunching".  She denies any giving way of the knee.  She is currently participating in physical therapy for both the right knee and her shoulder.      Medications:    Current Outpatient Medications:     acyclovir 5% (ZOVIRAX) 5 % Crea, Apply sparingly at first sign of outbreak five times daily for 4 days, Disp: , Rfl:     mometasone (ELOCON) 0.1 % solution, Apply topically 2 (two) times daily., Disp: , Rfl:     sertraline (ZOLOFT) 50 MG tablet, Take 50 mg by mouth once daily., Disp: , Rfl:     valACYclovir (VALTREX) 500 MG tablet, Take 500 mg by mouth 2 (two) times daily., Disp: , Rfl:     Allergies:  Review of patient's allergies indicates:  No Known Allergies    Vitals:  /71   Pulse 67   Ht 5' 10" (1.778 m)   Wt 73.5 kg (162 lb 0.6 oz)   BMI 23.25 kg/m²     Physical Examination:  Ortho Exam   Left shoulder exam:   Upon inspection, no visible deformity.    With palpation, there is tenderness over the anterolateral shoulder.    Range of motion reveals active forward elevation 170, external rotation at the side 30, and internal rotation to approximately T12 which is " slightly decreased from her right side.  Rotator cuff strength exam today reveals 5/5 elevation, external rotation, and internal rotation.  Positive Lily's.  Equivocal Neer and Reis impingement signs.    Right knee exam:   No cellulitis.    1+ effusion.    Range of motion is 0-135.    Lachman's 1A, negative pivot shift.  Negative anterior drawer.    Assessment:  1. Chronic left shoulder pain    2. S/P ACL reconstruction    3. S/P lateral meniscectomy of right knee      Plan:  The patient will participate in formal supervised physical therapy for the left shoulder and the right knee.  I suspect rotator cuff tendinitis versus partial tear of the rotator cuff.  In addition, early adhesive capsulitis may be in the differential.  She is going to follow up in 6 weeks.  If no improvement with her left shoulder pain, I would recommend MRI scan.    Orders Placed This Encounter    X-Ray Shoulder Trauma 3 view Left    Ambulatory referral/consult to Physical/Occupational Therapy     No follow-ups on file.

## 2022-12-14 ENCOUNTER — TELEPHONE (OUTPATIENT)
Dept: ORTHOPEDICS | Facility: CLINIC | Age: 52
End: 2022-12-14
Payer: COMMERCIAL

## 2023-05-05 ENCOUNTER — PATIENT OUTREACH (OUTPATIENT)
Dept: ADMINISTRATIVE | Facility: HOSPITAL | Age: 53
End: 2023-05-05
Payer: COMMERCIAL

## 2023-05-19 ENCOUNTER — OFFICE VISIT (OUTPATIENT)
Dept: INTERNAL MEDICINE | Facility: CLINIC | Age: 53
End: 2023-05-19
Payer: COMMERCIAL

## 2023-05-19 ENCOUNTER — LAB VISIT (OUTPATIENT)
Dept: LAB | Facility: OTHER | Age: 53
End: 2023-05-19
Attending: STUDENT IN AN ORGANIZED HEALTH CARE EDUCATION/TRAINING PROGRAM
Payer: COMMERCIAL

## 2023-05-19 VITALS
BODY MASS INDEX: 22.1 KG/M2 | WEIGHT: 154 LBS | DIASTOLIC BLOOD PRESSURE: 60 MMHG | HEART RATE: 60 BPM | SYSTOLIC BLOOD PRESSURE: 104 MMHG | OXYGEN SATURATION: 99 %

## 2023-05-19 DIAGNOSIS — M19.042 ARTHRITIS OF BOTH HANDS: ICD-10-CM

## 2023-05-19 DIAGNOSIS — M25.511 CHRONIC RIGHT SHOULDER PAIN: ICD-10-CM

## 2023-05-19 DIAGNOSIS — E55.9 VITAMIN D DEFICIENCY: ICD-10-CM

## 2023-05-19 DIAGNOSIS — Z12.31 SCREENING MAMMOGRAM FOR BREAST CANCER: ICD-10-CM

## 2023-05-19 DIAGNOSIS — Z13.1 SCREENING FOR DIABETES MELLITUS: ICD-10-CM

## 2023-05-19 DIAGNOSIS — F41.1 GENERALIZED ANXIETY DISORDER: ICD-10-CM

## 2023-05-19 DIAGNOSIS — G89.29 CHRONIC RIGHT SHOULDER PAIN: ICD-10-CM

## 2023-05-19 DIAGNOSIS — Z00.00 HEALTH MAINTENANCE EXAMINATION: Primary | ICD-10-CM

## 2023-05-19 DIAGNOSIS — Z13.6 SCREENING FOR CARDIOVASCULAR CONDITION: ICD-10-CM

## 2023-05-19 DIAGNOSIS — M19.041 ARTHRITIS OF BOTH HANDS: ICD-10-CM

## 2023-05-19 DIAGNOSIS — Z23 NEED FOR VACCINATION: ICD-10-CM

## 2023-05-19 DIAGNOSIS — Z00.00 HEALTH MAINTENANCE EXAMINATION: ICD-10-CM

## 2023-05-19 LAB
25(OH)D3+25(OH)D2 SERPL-MCNC: 26 NG/ML (ref 30–96)
ALBUMIN SERPL BCP-MCNC: 4.4 G/DL (ref 3.5–5.2)
ALP SERPL-CCNC: 54 U/L (ref 55–135)
ALT SERPL W/O P-5'-P-CCNC: 13 U/L (ref 10–44)
ANION GAP SERPL CALC-SCNC: 7 MMOL/L (ref 8–16)
AST SERPL-CCNC: 16 U/L (ref 10–40)
BASOPHILS # BLD AUTO: 0.03 K/UL (ref 0–0.2)
BASOPHILS NFR BLD: 0.4 % (ref 0–1.9)
BILIRUB SERPL-MCNC: 0.6 MG/DL (ref 0.1–1)
BUN SERPL-MCNC: 10 MG/DL (ref 6–20)
CALCIUM SERPL-MCNC: 9.7 MG/DL (ref 8.7–10.5)
CHLORIDE SERPL-SCNC: 106 MMOL/L (ref 95–110)
CHOLEST SERPL-MCNC: 169 MG/DL (ref 120–199)
CHOLEST/HDLC SERPL: 3.7 {RATIO} (ref 2–5)
CO2 SERPL-SCNC: 27 MMOL/L (ref 23–29)
CREAT SERPL-MCNC: 0.8 MG/DL (ref 0.5–1.4)
DIFFERENTIAL METHOD: NORMAL
EOSINOPHIL # BLD AUTO: 0.2 K/UL (ref 0–0.5)
EOSINOPHIL NFR BLD: 2.1 % (ref 0–8)
ERYTHROCYTE [DISTWIDTH] IN BLOOD BY AUTOMATED COUNT: 14.4 % (ref 11.5–14.5)
EST. GFR  (NO RACE VARIABLE): >60 ML/MIN/1.73 M^2
ESTIMATED AVG GLUCOSE: 103 MG/DL (ref 68–131)
ESTRADIOL SERPL-MCNC: 72 PG/ML
FSH SERPL-ACNC: 3.02 MIU/ML
GLUCOSE SERPL-MCNC: 68 MG/DL (ref 70–110)
HBA1C MFR BLD: 5.2 % (ref 4–5.6)
HCT VFR BLD AUTO: 38.6 % (ref 37–48.5)
HCV AB SERPL QL IA: NORMAL
HDLC SERPL-MCNC: 46 MG/DL (ref 40–75)
HDLC SERPL: 27.2 % (ref 20–50)
HGB BLD-MCNC: 12.5 G/DL (ref 12–16)
HIV 1+2 AB+HIV1 P24 AG SERPL QL IA: NORMAL
IMM GRANULOCYTES # BLD AUTO: 0.03 K/UL (ref 0–0.04)
IMM GRANULOCYTES NFR BLD AUTO: 0.4 % (ref 0–0.5)
LDLC SERPL CALC-MCNC: 107 MG/DL (ref 63–159)
LYMPHOCYTES # BLD AUTO: 1.5 K/UL (ref 1–4.8)
LYMPHOCYTES NFR BLD: 18.6 % (ref 18–48)
MCH RBC QN AUTO: 29.7 PG (ref 27–31)
MCHC RBC AUTO-ENTMCNC: 32.4 G/DL (ref 32–36)
MCV RBC AUTO: 92 FL (ref 82–98)
MONOCYTES # BLD AUTO: 0.7 K/UL (ref 0.3–1)
MONOCYTES NFR BLD: 8.9 % (ref 4–15)
NEUTROPHILS # BLD AUTO: 5.6 K/UL (ref 1.8–7.7)
NEUTROPHILS NFR BLD: 69.6 % (ref 38–73)
NONHDLC SERPL-MCNC: 123 MG/DL
NRBC BLD-RTO: 0 /100 WBC
PLATELET # BLD AUTO: 230 K/UL (ref 150–450)
PMV BLD AUTO: 9.5 FL (ref 9.2–12.9)
POTASSIUM SERPL-SCNC: 3.7 MMOL/L (ref 3.5–5.1)
PROT SERPL-MCNC: 7 G/DL (ref 6–8.4)
RBC # BLD AUTO: 4.21 M/UL (ref 4–5.4)
SODIUM SERPL-SCNC: 140 MMOL/L (ref 136–145)
TRIGL SERPL-MCNC: 80 MG/DL (ref 30–150)
TSH SERPL DL<=0.005 MIU/L-ACNC: 0.7 UIU/ML (ref 0.4–4)
WBC # BLD AUTO: 8.07 K/UL (ref 3.9–12.7)

## 2023-05-19 PROCEDURE — 99213 OFFICE O/P EST LOW 20 MIN: CPT | Mod: 25,S$GLB,, | Performed by: STUDENT IN AN ORGANIZED HEALTH CARE EDUCATION/TRAINING PROGRAM

## 2023-05-19 PROCEDURE — 1159F PR MEDICATION LIST DOCUMENTED IN MEDICAL RECORD: ICD-10-PCS | Mod: CPTII,S$GLB,, | Performed by: STUDENT IN AN ORGANIZED HEALTH CARE EDUCATION/TRAINING PROGRAM

## 2023-05-19 PROCEDURE — 82306 VITAMIN D 25 HYDROXY: CPT | Performed by: STUDENT IN AN ORGANIZED HEALTH CARE EDUCATION/TRAINING PROGRAM

## 2023-05-19 PROCEDURE — 1160F PR REVIEW ALL MEDS BY PRESCRIBER/CLIN PHARMACIST DOCUMENTED: ICD-10-PCS | Mod: CPTII,S$GLB,, | Performed by: STUDENT IN AN ORGANIZED HEALTH CARE EDUCATION/TRAINING PROGRAM

## 2023-05-19 PROCEDURE — 83036 HEMOGLOBIN GLYCOSYLATED A1C: CPT | Performed by: STUDENT IN AN ORGANIZED HEALTH CARE EDUCATION/TRAINING PROGRAM

## 2023-05-19 PROCEDURE — 3008F BODY MASS INDEX DOCD: CPT | Mod: CPTII,S$GLB,, | Performed by: STUDENT IN AN ORGANIZED HEALTH CARE EDUCATION/TRAINING PROGRAM

## 2023-05-19 PROCEDURE — 99999 PR PBB SHADOW E&M-EST. PATIENT-LVL IV: ICD-10-PCS | Mod: PBBFAC,,, | Performed by: STUDENT IN AN ORGANIZED HEALTH CARE EDUCATION/TRAINING PROGRAM

## 2023-05-19 PROCEDURE — 99386 PR PREVENTIVE VISIT,NEW,40-64: ICD-10-PCS | Mod: 25,S$GLB,, | Performed by: STUDENT IN AN ORGANIZED HEALTH CARE EDUCATION/TRAINING PROGRAM

## 2023-05-19 PROCEDURE — 85025 COMPLETE CBC W/AUTO DIFF WBC: CPT | Performed by: STUDENT IN AN ORGANIZED HEALTH CARE EDUCATION/TRAINING PROGRAM

## 2023-05-19 PROCEDURE — 83001 ASSAY OF GONADOTROPIN (FSH): CPT | Performed by: STUDENT IN AN ORGANIZED HEALTH CARE EDUCATION/TRAINING PROGRAM

## 2023-05-19 PROCEDURE — 3078F DIAST BP <80 MM HG: CPT | Mod: CPTII,S$GLB,, | Performed by: STUDENT IN AN ORGANIZED HEALTH CARE EDUCATION/TRAINING PROGRAM

## 2023-05-19 PROCEDURE — 36415 COLL VENOUS BLD VENIPUNCTURE: CPT | Performed by: STUDENT IN AN ORGANIZED HEALTH CARE EDUCATION/TRAINING PROGRAM

## 2023-05-19 PROCEDURE — 80061 LIPID PANEL: CPT | Performed by: STUDENT IN AN ORGANIZED HEALTH CARE EDUCATION/TRAINING PROGRAM

## 2023-05-19 PROCEDURE — 99386 PREV VISIT NEW AGE 40-64: CPT | Mod: 25,S$GLB,, | Performed by: STUDENT IN AN ORGANIZED HEALTH CARE EDUCATION/TRAINING PROGRAM

## 2023-05-19 PROCEDURE — 3074F PR MOST RECENT SYSTOLIC BLOOD PRESSURE < 130 MM HG: ICD-10-PCS | Mod: CPTII,S$GLB,, | Performed by: STUDENT IN AN ORGANIZED HEALTH CARE EDUCATION/TRAINING PROGRAM

## 2023-05-19 PROCEDURE — 1159F MED LIST DOCD IN RCRD: CPT | Mod: CPTII,S$GLB,, | Performed by: STUDENT IN AN ORGANIZED HEALTH CARE EDUCATION/TRAINING PROGRAM

## 2023-05-19 PROCEDURE — 1160F RVW MEDS BY RX/DR IN RCRD: CPT | Mod: CPTII,S$GLB,, | Performed by: STUDENT IN AN ORGANIZED HEALTH CARE EDUCATION/TRAINING PROGRAM

## 2023-05-19 PROCEDURE — 86803 HEPATITIS C AB TEST: CPT | Performed by: STUDENT IN AN ORGANIZED HEALTH CARE EDUCATION/TRAINING PROGRAM

## 2023-05-19 PROCEDURE — 3044F PR MOST RECENT HEMOGLOBIN A1C LEVEL <7.0%: ICD-10-PCS | Mod: CPTII,S$GLB,, | Performed by: STUDENT IN AN ORGANIZED HEALTH CARE EDUCATION/TRAINING PROGRAM

## 2023-05-19 PROCEDURE — 84443 ASSAY THYROID STIM HORMONE: CPT | Performed by: STUDENT IN AN ORGANIZED HEALTH CARE EDUCATION/TRAINING PROGRAM

## 2023-05-19 PROCEDURE — 82670 ASSAY OF TOTAL ESTRADIOL: CPT | Performed by: STUDENT IN AN ORGANIZED HEALTH CARE EDUCATION/TRAINING PROGRAM

## 2023-05-19 PROCEDURE — 3078F PR MOST RECENT DIASTOLIC BLOOD PRESSURE < 80 MM HG: ICD-10-PCS | Mod: CPTII,S$GLB,, | Performed by: STUDENT IN AN ORGANIZED HEALTH CARE EDUCATION/TRAINING PROGRAM

## 2023-05-19 PROCEDURE — 87389 HIV-1 AG W/HIV-1&-2 AB AG IA: CPT | Performed by: STUDENT IN AN ORGANIZED HEALTH CARE EDUCATION/TRAINING PROGRAM

## 2023-05-19 PROCEDURE — 3074F SYST BP LT 130 MM HG: CPT | Mod: CPTII,S$GLB,, | Performed by: STUDENT IN AN ORGANIZED HEALTH CARE EDUCATION/TRAINING PROGRAM

## 2023-05-19 PROCEDURE — 99213 PR OFFICE/OUTPT VISIT, EST, LEVL III, 20-29 MIN: ICD-10-PCS | Mod: 25,S$GLB,, | Performed by: STUDENT IN AN ORGANIZED HEALTH CARE EDUCATION/TRAINING PROGRAM

## 2023-05-19 PROCEDURE — 3008F PR BODY MASS INDEX (BMI) DOCUMENTED: ICD-10-PCS | Mod: CPTII,S$GLB,, | Performed by: STUDENT IN AN ORGANIZED HEALTH CARE EDUCATION/TRAINING PROGRAM

## 2023-05-19 PROCEDURE — 3044F HG A1C LEVEL LT 7.0%: CPT | Mod: CPTII,S$GLB,, | Performed by: STUDENT IN AN ORGANIZED HEALTH CARE EDUCATION/TRAINING PROGRAM

## 2023-05-19 PROCEDURE — 80053 COMPREHEN METABOLIC PANEL: CPT | Performed by: STUDENT IN AN ORGANIZED HEALTH CARE EDUCATION/TRAINING PROGRAM

## 2023-05-19 PROCEDURE — 99999 PR PBB SHADOW E&M-EST. PATIENT-LVL IV: CPT | Mod: PBBFAC,,, | Performed by: STUDENT IN AN ORGANIZED HEALTH CARE EDUCATION/TRAINING PROGRAM

## 2023-05-19 RX ORDER — DICLOFENAC SODIUM 10 MG/G
2 GEL TOPICAL 4 TIMES DAILY
Qty: 100 G | Refills: 3 | Status: SHIPPED | OUTPATIENT
Start: 2023-05-19

## 2023-05-19 NOTE — PROGRESS NOTES
Subjective:       Patient ID: Liza Byrne is a 52 y.o. female.    Chief Complaint: Health maintenance examination [Z00.00]    Patient is new to me, here to establish care.    Health maintenance -   Colonoscopy performed . Told to repeat in 10 years.  Denies family history of colorectal cancer.  Mammogram in . Due for repeat.  History of abnormal mammogram, required biopsy last year.  Denies family history of breast cancer.  Denies family history of ovarian cancer.  Last pap performed .   Denies history of abnormal pap smear.  Seeing Dr. Omar Jenkins for gynecology.  Denies family history of osteoporosis.  Family history of cardiac disease.  UTD on Tdap, COVID primary vaccinations.  Due for COVID bivalent, shingles vaccinations.  Started smoking at age 42, at most 1 PPD. Stopped smoking 47.  Denies current alcohol use.   Marijuana use.  Due for HIV and hepatitis C screening.  Due for lipid screening.  Due for diabetes screening.  Endorses overall healthy diet.   Endorses active lifestyle.    Has regular menstrual cycles monthly.  Menstruation lasts for 3-4 days.  Denies menorrhagia or requiring more than 5 pads or tampons in a single day.  Began menarche at age 15.   [id number]  Denies gestational diabetes and HTN.  Currently sexually active with .  Not currently using contraception.    Taking Zoloft for generalized with good effect  Interested in establishing with with counselor      Experiencing chronic bilateral shoulder pain   Previously left worse than right  Now left has improved after PT last year, but right is worsened  Interested in PT for right shoulder    Endorses bilateral hand arthritis  Not currently using any topical therapies     Review of Systems   Constitutional:  Negative for appetite change, chills, fatigue, fever and unexpected weight change.   Respiratory:  Negative for cough and shortness of breath.    Cardiovascular:  Negative for chest pain, palpitations and leg  swelling.   Gastrointestinal:  Negative for abdominal pain, constipation, diarrhea, nausea and vomiting.   Musculoskeletal:  Positive for arthralgias and myalgias.   Skin:  Negative for rash.   Neurological:  Negative for dizziness, syncope, weakness and headaches.       Current Outpatient Medications   Medication Instructions    acyclovir 5% (ZOVIRAX) 5 % Crea Apply sparingly at first sign of outbreak five times daily for 4 days    mometasone (ELOCON) 0.1 % solution Topical (Top), 2 times daily    sertraline (ZOLOFT) 50 mg, Oral, Daily    valACYclovir (VALTREX) 500 mg, Oral, 2 times daily     Objective:      Vitals:    05/19/23 1505   BP: 104/60   Pulse: 60   SpO2: 99%   Weight: 69.8 kg (153 lb 15.9 oz)   PainSc: 0-No pain     Body mass index is 22.1 kg/m².    Physical Exam  Vitals reviewed.   Constitutional:       General: She is not in acute distress.     Appearance: Normal appearance. She is not ill-appearing or diaphoretic.   HENT:      Head: Normocephalic and atraumatic.      Right Ear: Tympanic membrane, ear canal and external ear normal. There is no impacted cerumen.      Left Ear: Tympanic membrane, ear canal and external ear normal. There is no impacted cerumen.      Nose: Nose normal. No rhinorrhea.      Mouth/Throat:      Mouth: Mucous membranes are moist.      Pharynx: Oropharynx is clear. No oropharyngeal exudate or posterior oropharyngeal erythema.   Eyes:      General: No scleral icterus.        Right eye: No discharge.         Left eye: No discharge.      Conjunctiva/sclera: Conjunctivae normal.   Neck:      Thyroid: No thyromegaly or thyroid tenderness.      Trachea: Trachea normal.   Cardiovascular:      Rate and Rhythm: Normal rate and regular rhythm.      Heart sounds: Normal heart sounds. No murmur heard.    No friction rub. No gallop.   Pulmonary:      Effort: Pulmonary effort is normal. No respiratory distress.      Breath sounds: Normal breath sounds. No stridor. No wheezing, rhonchi or  rales.   Abdominal:      General: There is no distension.      Palpations: Abdomen is soft.      Tenderness: There is no abdominal tenderness. There is no guarding or rebound.   Musculoskeletal:         General: No swelling or deformity.      Cervical back: Neck supple.      Comments:   Negative empty can test right.    No pain with external rotation of right arm with arm at side and elbow flexed to 90 degrees.   Pain with modified lift-off test right side.  No muscle wasting appreciated to right shoulder.   Slight pain with abduction of right arm above 90 degrees.   Negative cross-arm test right.     Lymphadenopathy:      Head:      Right side of head: No submandibular or posterior auricular adenopathy.      Left side of head: No submandibular or posterior auricular adenopathy.      Cervical: No cervical adenopathy.      Right cervical: No superficial, deep or posterior cervical adenopathy.     Left cervical: No superficial, deep or posterior cervical adenopathy.      Upper Body:      Right upper body: No supraclavicular adenopathy.      Left upper body: No supraclavicular adenopathy.   Skin:     General: Skin is warm and dry.   Neurological:      General: No focal deficit present.      Mental Status: She is alert. Mental status is at baseline.      Gait: Gait normal.   Psychiatric:         Mood and Affect: Mood normal.         Behavior: Behavior normal.       Assessment:       1. Health maintenance examination    2. Generalized anxiety disorder    3. Chronic right shoulder pain    4. Arthritis of both hands    5. Vitamin D deficiency    6. Need for vaccination    7. Screening for diabetes mellitus    8. Screening for cardiovascular condition    9. Screening mammogram for breast cancer        Plan:       Generalized anxiety disorder  Continue current medications.  RTC in 6 months for follow up.  -     Ambulatory referral/consult to Psychology; Future  -     Comprehensive Metabolic Panel; Future  -     TSH;  Future  -     CBC Auto Differential; Future    Chronic right shoulder pain  Referral to PT   RTC PRN   -     Ambulatory referral/consult to Physical/Occupational Therapy; Future    Arthritis of both hands  Trial of Voltaren gel topically PRN  -     diclofenac sodium (VOLTAREN) 1 % Gel; Apply 2 g topically 4 (four) times daily.    Vitamin D deficiency  -     Vitamin D; Future    Health maintenance examination  Reviewed and discussed age appropriate screenings and immunizations.  RTC in 1 year for annual appointment, sooner PRN.  -     FOLLICLE STIMULATING HORMONE; Future  -     Estradiol; Future  -     Comprehensive Metabolic Panel; Future  -     TSH; Future  -     Lipid Panel; Future  -     Hemoglobin A1C; Future  -     CBC Auto Differential; Future  -     Hepatitis C Antibody; Future  -     HIV 1/2 Ag/Ab (4th Gen); Future  -     Vitamin D; Future    Need for vaccination  Provided written Rx for shingles vaccination, advised to obtain at [facility] pharmacy on 2nd floor or preferred pharmacy.    Screening for diabetes mellitus  -     Hemoglobin A1C; Future    Screening for cardiovascular condition  -     Lipid Panel; Future    Screening mammogram for breast cancer  -     Mammo Digital Screening Mendel w/ Eulogio; Future        Yelitza Garvin MD  5/19/2023

## 2023-05-19 NOTE — PATIENT INSTRUCTIONS
Vitex agnus castus (chasteberry) 20 to 40 mg of vitex extract daily.    Call to schedule with [facility] psychiatry or psychology: [phone] or [phone]    Other psychiatrists:   Dr. Mirna Melednrez [phone]  Dr. Jeremías Everett [phone]  Dr. Héctor Page [phone]  Dr. Annalise Barksdale - [phone]   Dr. Ismael Singleton - [phone], [phone]   Dr. Sheldon Panda - [phone]   Dr. Gwen Lopez - [phone]   Dr. Thu Larios - [phone]   Dr. Lior Mcmullen - [phone]     [facility]: [phone]     Therapy/Psychology:   You may also call your insurance to find providers who are covered in your area.    [facility]   Address: [facility] [place] [address]   Phone: [phone]   [email or web address]     [facility] [address], [address] [facility]   Phone: [phone]   You can email for an appointment at: [email or web address]     [facility]   [address] [place] [address], [facility]   [email or web address]   Dr. Harleen Mejia, [phone] or [email or web address]   Dr. Ping Cortes, [phone] or [email or web address]     Deyanira JONESW (therapist) [phone]   [address] [facility]   Marlyn JONESW (therapist) [phone]   [address] [place] [address]   Ellen JONESW (therapist) [phone]   [address] [place] [address]   DOMENICO Singleton LCSW (therapist) [phone]   Valerio Gallegos [phone] (therapist) [facility]   Jose Gaytan (therapist) [phone]  [address] (therapist) [phone] [address] [facility]     [facility] [phone] [address] [address]     Online Therapist:     [email or web address]   [email or web address]     Free Guided Meditations   [email or web address]    [email or web address]   [email or web address]

## 2023-05-26 PROBLEM — F41.1 GENERALIZED ANXIETY DISORDER: Status: ACTIVE | Noted: 2023-05-26

## 2023-05-30 ENCOUNTER — CLINICAL SUPPORT (OUTPATIENT)
Dept: REHABILITATION | Facility: OTHER | Age: 53
End: 2023-05-30
Attending: STUDENT IN AN ORGANIZED HEALTH CARE EDUCATION/TRAINING PROGRAM
Payer: COMMERCIAL

## 2023-05-30 DIAGNOSIS — M54.2 NECK PAIN ON RIGHT SIDE: ICD-10-CM

## 2023-05-30 DIAGNOSIS — G89.29 CHRONIC RIGHT SHOULDER PAIN: ICD-10-CM

## 2023-05-30 DIAGNOSIS — M25.511 CHRONIC RIGHT SHOULDER PAIN: ICD-10-CM

## 2023-05-30 PROCEDURE — 97162 PT EVAL MOD COMPLEX 30 MIN: CPT | Mod: PN | Performed by: INTERNAL MEDICINE

## 2023-05-30 PROCEDURE — 97110 THERAPEUTIC EXERCISES: CPT | Mod: PN | Performed by: INTERNAL MEDICINE

## 2023-06-01 PROBLEM — M25.511 CHRONIC RIGHT SHOULDER PAIN: Status: ACTIVE | Noted: 2023-06-01

## 2023-06-01 PROBLEM — M54.2 NECK PAIN ON RIGHT SIDE: Status: ACTIVE | Noted: 2023-06-01

## 2023-06-01 PROBLEM — G89.29 CHRONIC RIGHT SHOULDER PAIN: Status: ACTIVE | Noted: 2023-06-01

## 2023-06-01 NOTE — PLAN OF CARE
[facility]  Physical Therapy Initial Evaluation    Name: Liza Byrne  Clinic Number: [id number]    Therapy Diagnosis:   Encounter Diagnoses   Name Primary?    Chronic right shoulder pain     Neck pain on right side      Physician: Yelitza Garvin MD    Physician Orders: PT Eval and Treat    Medical Diagnosis: M25.511,G89.29 (ICD-10-CM) - Chronic right shoulder pain   Date of Surgery: NA  Evaluation Date: 5/30/2023  Authorization Period Expiration: 12/29/2023 , OT/PT/ST/OSTEO/ACU/CHIRO/VIS CMB VST MX PER ISAEL QTR : 12 Visit(s) Every 3 Months      Plan of Care Expiration: 8/28/23  Progress Note Due: 6/29/23  Visit # / Visits authorized: 1/ 12    Time In: 10am   Time Out: 1045 am   Total Billable Time: 45 minutes    Precautions: Standard, S/P ACL reconstruction 2022       Subjective   Date of onset: Sept 2022 shoulder pain, nsmoi  History of current condition - Liza reports: diff reaching back ( has improved). Dr Butler said  pain due to degeneration. Diff with MR behind spine and lying on R side. Intermittent pain. Sees chiropractor and acupuncturist for neck pain. Also reports L shoulder pain and decreased rom but R is worse.       No past medical history on file.  Liza Byrne  has a past surgical history that includes Knee arthroscopy w/ meniscectomy (Right, 06/02/2022); Knee arthroscopy w/ ACL reconstruction (Right, 09/15/2022); Excision of medial meniscus of knee (09/15/2022); Chondroplasty of knee (09/15/2022); Augmentation of breast (2004); and Mammo Breast Stereotactic Biopsy Right.    Liza has a current medication list which includes the following prescription(s): acyclovir 5%, diclofenac sodium, mometasone, sertraline, and valacyclovir.    Review of patient's allergies indicates:  No Known Allergies     Imaging  : xray EXAMINATION:  XR SHOULDER TRAUMA 3 VIEW LEFT     CLINICAL HISTORY:  Pain in left shoulder     TECHNIQUE:  Three views of the left shoulder were  performed.     COMPARISON  None     FINDINGS:  No acute fractures.  No malalignment.  Some degenerative spurring about the inferior glenoid side of the preserved glenohumeral articulation.  Preserved acromioclavicular articulation.  No findings of calcific tendinitis.     Impression:     As above     MRI NA      Prior Therapy: Yes, ACL repair revision 2022 ( 20 yrs ago had first surgery). Bone spurs spine cont with PT HEP. Cont chirporactor/ acupuncture for neck pain  Social History:  lives with their family including 5 yr old, no stairs  Occupation: writer, film director  Prior Level of Function: Athletic club 3x per week- 30 min cardio, UE weights, leg weights  Current Level of Function: stopped UE ex. Diff sleeping, shoulder extension, supine ( during visit today).     UE dominance : R   Dizziness/ HA's:  menopause HAs  and newly getting migraines.     Pain:  Current 0/10, worst 8/10    Location: right  inside joint   Aggravating Factors: sleeping/ R sidelying, shoulder extension B   Easing Factors: rest, supporting R elbow    Pts goals: decrease pain     Objective         Postural examination in standing:  fd neck and  B shoulders     Functional assessment:   - walking:  I            ROM:     Seated shoulder flex AROM    B WFL  Seated shoulder abd AROM  B WFL   Supine Shoulder flex PROM R 150 , L 170  Supine Shoulder abduction PROM B WFL   Shoulder MR PROM  R 35, L 45   Spine MR:  L5 R, L4  L    Shoulder ER PROM  WFL B       Cerv arom   Flex 60 tight Upper trap  Ext 50 R UE ache  SB R 30,  SB L 35 r upper trap  Rot R 90, Rot L 75    MMT:  Shoulder flex  R 4+, L 5  Shoulder abduction R 4+, L 5  Shoulder er  R 4+, L 5  Shoulder mr B 5   Biceps B 5  Triceps B 5  Wrist flex B 5  Wrist ext B 5  5th finger abd B 5    Low traps R 3+ pain, L 4-  Mid traps R 3- pain, L 4-  Lats R 4- pain, L 4    Special Tests:   Reis Stanislav R neg, L neg  Neer Impingement R neg, L neg  Speeds neg R  Ant apprehension neg R  Cervical  compression NA  Cervical distraction NA        Palpation/ joint mob:   ttp R bic groove, decreased R GH JM       CMS Impairment/Limitation/Restriction for FOTO shoulder Survey    Therapist reviewed FOTO scores for Liza Byrne on 5/30/2023.   FOTO documents entered into EPIC - see Media section.       Category: Carrying    Current : 45  Goal: 32  Discharge: NA         TREATMENT   Treatment Time In: 1030  Treatment Time Out: 1045  Total Treatment time separate from Evaluation time:15    Liza received therapeutic exercises to develop strength, endurance, ROM, flexibility, posture, and core stabilization for 10 minutes including:  Pendulum 1 min     Scapular retraction 10 sec x 10  sidelying ext rotation 15x  Sleeper stretch 20 sec x 4   B shoulder ext RTB 10x  Cross body stretch 20 sec x 3           Liza received the following manual therapy techniques: Joint mobilizations were applied to the: R shoulder  for 0 minutes, including:  NA    Liza participated in neuromuscular re-education activities to improve: Balance, Coordination, Kinesthetic, Sense, Proprioception, and Posture for 0 minutes. The following activities were included:  NA         Education provided re: ex as criselda,  posture at desk, using arm rests and taking freq breaks to stretch.    Written Home Exercises Provided: see above.  Exercises were reviewed and Liza was able to demonstrate them prior to the end of the session.   Pt received a written copy of exercises to perform at home. Liza demonstrated good  understanding of the education provided.     See EMR under MEDIA for exercises given.   Assessment   Liza is a 52 y.o. female referred to outpatient Physical Therapy with a medical diagnosis of B shoulder pain, possible adhesive capsulitis. Pt presents with pain, weakness, decreased flexibility, physical impairments.     Pt prognosis is Good.   Pt will benefit from skilled outpatient Physical Therapy to address the deficits stated above and in  the chart below, provide pt/family education, and to maximize pt's level of independence.     Plan of care discussed with patient: YES  Pt's spiritual, cultural and educational needs considered and patient is agreeable to the plan of care and goals as stated below:     Anticipated Barriers for therapy: chronic neck pain, possible adhesive capsulitis     Goals:  Short Term Goals: 2 weeks          1. Decreased R shoulder pain to 7/10  Progressing, not met        2. Improved B MR ROM Progressing, not met      Long Term Goals: 12 weeks     Independent with HEP. Progressing, not met  Report decreased    R  shoulder   pain  <   / =  4  /10 with adls such as sleeping, reaching in car/ shoulder extension Progressing, not met  Increased MMT  for  B shoulder to 5/5  Progressing, not met  Increased arom  for  B MR to bra line Progressing, not met  Improved FOTO score to 32 Progressing, not met           Medical Necessity is demonstrated by the following  History  Co-morbidities and personal factors that may impact the plan of care [] LOW: no personal factors / co-morbidities  [x] MODERATE: 1-2 personal factors / co-morbidities  [] HIGH: 3+ personal factors / co-morbidities    Moderate / High Support Documentation: neck pain, shoulder pain     Examination  Body Structures and Functions, activity limitations and participation restrictions that may impact the plan of care [] LOW: addressing 1-2 elements  [x] MODERATE: 3+ elements  [] HIGH: 4+ elements (please support below)    Moderate / High Support Documentation: strength, rom, adls     Clinical Presentation [] LOW: stable  [x] MODERATE: Evolving  [] HIGH: Unstable     Decision Making/ Complexity Score: moderate         Mod- 1-2  personal factors/ comorbidities, address 3+ elements  High 3+ personal factors/ comorbidities, address 4+ elements, unstable clinical presentation    Plan   Certification Period/Plan of care expiration: see above    Outpatient Physical Therapy 2 times  weekly for 12 weeks to include the following interventions: Electrical Stimulation  , Manual Therapy, Moist Heat/ Ice, Neuromuscular Re-ed, Patient Education, Self Care, Therapeutic Activities, and Therapeutic Exercise, dry needling.     Neli Proctor, PT

## 2023-06-05 ENCOUNTER — TELEPHONE (OUTPATIENT)
Dept: OTOLARYNGOLOGY | Facility: CLINIC | Age: 53
End: 2023-06-05
Payer: COMMERCIAL

## 2023-06-05 ENCOUNTER — OFFICE VISIT (OUTPATIENT)
Dept: INTERNAL MEDICINE | Facility: CLINIC | Age: 53
End: 2023-06-05
Payer: COMMERCIAL

## 2023-06-05 ENCOUNTER — TELEPHONE (OUTPATIENT)
Dept: INTERNAL MEDICINE | Facility: CLINIC | Age: 53
End: 2023-06-05
Payer: COMMERCIAL

## 2023-06-05 VITALS
BODY MASS INDEX: 21.6 KG/M2 | SYSTOLIC BLOOD PRESSURE: 94 MMHG | DIASTOLIC BLOOD PRESSURE: 60 MMHG | HEIGHT: 70 IN | WEIGHT: 150.88 LBS | HEART RATE: 80 BPM | OXYGEN SATURATION: 100 %

## 2023-06-05 DIAGNOSIS — H61.22 IMPACTED CERUMEN OF LEFT EAR: Primary | ICD-10-CM

## 2023-06-05 PROCEDURE — 3044F PR MOST RECENT HEMOGLOBIN A1C LEVEL <7.0%: ICD-10-PCS | Mod: CPTII,S$GLB,, | Performed by: FAMILY MEDICINE

## 2023-06-05 PROCEDURE — 3078F PR MOST RECENT DIASTOLIC BLOOD PRESSURE < 80 MM HG: ICD-10-PCS | Mod: CPTII,S$GLB,, | Performed by: FAMILY MEDICINE

## 2023-06-05 PROCEDURE — 3008F BODY MASS INDEX DOCD: CPT | Mod: CPTII,S$GLB,, | Performed by: FAMILY MEDICINE

## 2023-06-05 PROCEDURE — 3044F HG A1C LEVEL LT 7.0%: CPT | Mod: CPTII,S$GLB,, | Performed by: FAMILY MEDICINE

## 2023-06-05 PROCEDURE — 3074F PR MOST RECENT SYSTOLIC BLOOD PRESSURE < 130 MM HG: ICD-10-PCS | Mod: CPTII,S$GLB,, | Performed by: FAMILY MEDICINE

## 2023-06-05 PROCEDURE — 3074F SYST BP LT 130 MM HG: CPT | Mod: CPTII,S$GLB,, | Performed by: FAMILY MEDICINE

## 2023-06-05 PROCEDURE — 99999 PR PBB SHADOW E&M-EST. PATIENT-LVL IV: CPT | Mod: PBBFAC,,, | Performed by: FAMILY MEDICINE

## 2023-06-05 PROCEDURE — 3078F DIAST BP <80 MM HG: CPT | Mod: CPTII,S$GLB,, | Performed by: FAMILY MEDICINE

## 2023-06-05 PROCEDURE — 99213 OFFICE O/P EST LOW 20 MIN: CPT | Mod: S$GLB,,, | Performed by: FAMILY MEDICINE

## 2023-06-05 PROCEDURE — 1159F MED LIST DOCD IN RCRD: CPT | Mod: CPTII,S$GLB,, | Performed by: FAMILY MEDICINE

## 2023-06-05 PROCEDURE — 99999 PR PBB SHADOW E&M-EST. PATIENT-LVL IV: ICD-10-PCS | Mod: PBBFAC,,, | Performed by: FAMILY MEDICINE

## 2023-06-05 PROCEDURE — 3008F PR BODY MASS INDEX (BMI) DOCUMENTED: ICD-10-PCS | Mod: CPTII,S$GLB,, | Performed by: FAMILY MEDICINE

## 2023-06-05 PROCEDURE — 99213 PR OFFICE/OUTPT VISIT, EST, LEVL III, 20-29 MIN: ICD-10-PCS | Mod: S$GLB,,, | Performed by: FAMILY MEDICINE

## 2023-06-05 PROCEDURE — 1159F PR MEDICATION LIST DOCUMENTED IN MEDICAL RECORD: ICD-10-PCS | Mod: CPTII,S$GLB,, | Performed by: FAMILY MEDICINE

## 2023-06-05 RX ORDER — NEOMYCIN SULFATE, POLYMYXIN B SULFATE AND HYDROCORTISONE 10; 3.5; 1 MG/ML; MG/ML; [USP'U]/ML
3 SUSPENSION/ DROPS AURICULAR (OTIC) 3 TIMES DAILY
Qty: 10 ML | Refills: 0 | Status: SHIPPED | OUTPATIENT
Start: 2023-06-05

## 2023-06-05 RX ORDER — CHOLECALCIFEROL (VITAMIN D3) 25 MCG
1000 TABLET ORAL DAILY
COMMUNITY

## 2023-06-05 RX ORDER — VITAMIN B COMPLEX
1 CAPSULE ORAL DAILY
COMMUNITY

## 2023-06-05 NOTE — TELEPHONE ENCOUNTER
----- Message from Chela Mendez MA sent at 6/5/2023  4:05 PM CDT -----  Regarding: cerumen removal  Hello! Are yall able to reach out to this pt to get her in ASAP for cerumen removal? It's become quite painful for her.  Thanks for any help!

## 2023-06-05 NOTE — TELEPHONE ENCOUNTER
Returned pt call. Offered pt an appointment on 6/12. Pt declined stating she would like something sooner. Pt said she'll try an urgent care.     ----- Message from Chela Mendez MA sent at 6/5/2023  4:05 PM CDT -----  Regarding: cerumen removal  Hello! Are yall able to reach out to this pt to get her in ASAP for cerumen removal? It's become quite painful for her.  Thanks for any help!

## 2023-06-05 NOTE — TELEPHONE ENCOUNTER
----- Message from Radha Parikh sent at 6/5/2023  9:50 AM CDT -----  .Type: Patient Call Back    Who called:self     What is the request in detail: patient is experiencing severe ear pain and wants to know what she should do     Can the clinic reply by [facility]? call    Would the patient rather a call back or a response via [facility]? call    Best call back number: .[phone]     Additional Information:

## 2023-06-08 ENCOUNTER — CLINICAL SUPPORT (OUTPATIENT)
Dept: REHABILITATION | Facility: OTHER | Age: 53
End: 2023-06-08
Attending: STUDENT IN AN ORGANIZED HEALTH CARE EDUCATION/TRAINING PROGRAM
Payer: COMMERCIAL

## 2023-06-08 DIAGNOSIS — M54.2 NECK PAIN ON RIGHT SIDE: ICD-10-CM

## 2023-06-08 DIAGNOSIS — M25.511 CHRONIC RIGHT SHOULDER PAIN: Primary | ICD-10-CM

## 2023-06-08 DIAGNOSIS — G89.29 CHRONIC RIGHT SHOULDER PAIN: Primary | ICD-10-CM

## 2023-06-08 PROCEDURE — 97110 THERAPEUTIC EXERCISES: CPT | Mod: PN | Performed by: INTERNAL MEDICINE

## 2023-06-08 PROCEDURE — 97140 MANUAL THERAPY 1/> REGIONS: CPT | Mod: PN | Performed by: INTERNAL MEDICINE

## 2023-06-08 NOTE — PROGRESS NOTES
Physical Therapy Daily Treatment Note     Name: Liza Byrne  Clinic Number: [id number]    Therapy Diagnosis: shoulder pain R chronic  Physician: Yelitza Garvin MD    Visit Date: 6/8/2023       Physician Orders: PT Eval and Treat    Medical Diagnosis: M25.511,G89.29 (ICD-10-CM) - Chronic right shoulder pain   Date of Surgery: NA  Evaluation Date: 5/30/2023  Authorization Period Expiration: 12/29/2023 , OT/PT/ST/OSTEO/ACU/CHIRO/VIS CMB VST MX PER ISAEL QTR : 12 Visit(s) Every 3 Months         Visit #/Visits authorized: 2/ 12     Time In: 12  Time Out: 1230  Total Billable Time: 30 minutes    Precautions: Standard, R ACL repair 2022    Subjective     Pt reports: no pain presently. Cont seeing chiropractor and acupuncturist.  She was not compliant with home exercise program. Busy preparing to go on vacation and writing.  Response to previous treatment: no c/o  Functional change: none    Pain: 0/10, R 4/10 with horiz abd.   Location: right shoulder      Objective    Postural examination in standing:  fd neck and  B shoulders      5/30 ROM:      Seated shoulder flex AROM    B WFL  Seated shoulder abd AROM  B WFL   Supine Shoulder flex PROM R 150 , L 170  Supine Shoulder abduction PROM B WFL     6/11/2023  Shoulder MR PROM  R 50 , L 45   Spine MR:  L5 R, L4  L    Shoulder ER PROM  WFL B         Cerv arom   Flex 60 tight Upper trap  Ext 50 R UE ache  SB R 30,  SB L 35 r upper trap  Rot R 90, Rot L 75     5/30 MMT:  Shoulder flex  R 4+, L 5  Shoulder abduction R 4+, L 5  Shoulder er  R 4+, L 5  Shoulder mr B 5   Low traps R 3+ pain, L 4-  Mid traps R 3- pain, L 4-  Lats R 4- pain, L 4     5/30  Special Tests:   Reis Stanislav R neg, L neg  Neer Impingement R neg, L neg  Speeds neg R  Ant apprehension neg R  Cervical compression NA  Cervical distraction NA           Palpation/ joint mob:   ttp R bic groove, decreased R GH JM         CMS Impairment/Limitation/Restriction for FOTO shoulder Survey     Therapist reviewed  FOTO scores for Liza Byrne on 5/30/2023.   FOTO documents entered into EPIC - see Media section.        Category: Carrying     Current : 45  Goal: 32  Discharge: NA             Liza received therapeutic exercises to develop strength, endurance, ROM, flexibility, posture, and core stabilization for 20 minutes including:  Pendulum 1 min    Scapular retraction 10 sec x 10  sidelying ext rotation 15x 2  Supine protraction 20x B  Sleeper stretch 20 sec x 4   B shoulder ext RTB 10x 3  Cross body stretch 20 sec x 3    scaption 3 x 10   Horiz abd rtb 2x 10 B         Liza received the following manual therapy techniques: Joint mobilizations were applied to the: R shoulder  for 10 minutes, including:  Inf/ post R GH gr 2 jm with emphasis on IR ROM     Liza participated in neuromuscular re-education activities to improve: Balance, Coordination, Kinesthetic, Sense, Proprioception, and Posture for 0 minutes. The following activities were included:             Education provided re: ex as criselda,  posture at desk, using arm rests and taking freq breaks to stretch.     Written Home Exercises Provided: B HA rtb, scaption  Exercises were reviewed and Liza was able to demonstrate them prior to the end of the session.   Pt received a written copy of exercises to perform at home. Liza demonstrated good  understanding of the education provided.      See EMR under pt instructions  for exercises given.     Assessment     Progressing with improved MR ROM R. Cont with inability to perform prone scap ex due to radicular sx's in R UE; no c/o in standing.  Liza Is progressing well towards her goals.   Pt prognosis is Good.     Pt will continue to benefit from skilled outpatient physical therapy to address the deficits listed in the problem list box on initial evaluation, provide pt/family education and to maximize pt's level of independence in the home and community environment.     Pt's spiritual, cultural and educational needs  considered and pt agreeable to plan of care and goals.    Anticipated barriers to physical therapy: chronic neck and shoulder pain    Goals: Goals:  Short Term Goals: 2 weeks          1. Decreased R shoulder pain to 7/10  Progressing, not met        2. Improved R MR ROM  met        Long Term Goals: 12 weeks      Independent with HEP. Progressing, not met  Report decreased    R  shoulder   pain  <   / =  4  /10 with adls such as sleeping, reaching in car/ shoulder extension Progressing, not met  Increased MMT  for  B shoulder to 5/5  Progressing, not met  Increased arom  for  B MR to bra line Progressing, not met  Improved FOTO score to 32 Progressing, not met       Plan     Certification Period/Plan of care expiration: see above     Outpatient Physical Therapy 1 times weekly for 10 weeks to include the following interventions: Electrical Stimulation  , Manual Therapy, Moist Heat/ Ice, Neuromuscular Re-ed, Patient Education, Self Care, Therapeutic Activities, and Therapeutic Exercise, dry needling.        Neli Proctor, PT

## 2023-06-17 ENCOUNTER — PATIENT MESSAGE (OUTPATIENT)
Dept: INTERNAL MEDICINE | Facility: CLINIC | Age: 53
End: 2023-06-17
Payer: COMMERCIAL

## 2023-06-17 PROBLEM — H61.22 IMPACTED CERUMEN OF LEFT EAR: Status: ACTIVE | Noted: 2023-06-17

## 2023-06-17 NOTE — ASSESSMENT & PLAN NOTE
Attempted cerumen removal with irrigation. Some was removed however due to discomfort she was unable to tolerate further irrigation. Referred to ENT but provided cortisporin to use in meantime to hopefully help with symptoms

## 2023-06-17 NOTE — PROGRESS NOTES
Subjective:       Patient ID: Liza Byrne is a 52 y.o. female.    Chief Complaint: Otalgia (Ear blockage- saw Bharathi last week for ear issue, she said there was blockage and had her start on drops. Has gotten worse, pain and pressure now )    Otalgia   Pertinent negatives include no abdominal pain, coughing or rash.   Came in today with new left ear pain and decreased hearing. Has not improved since trying some OTC drops.   She does wear ear plugs at times but has never had problems with ear wax or otitis externa in the past.    Tests to Keep You Healthy    Mammogram: SCHEDULED  Colon Cancer Screening: Met on 12/16/2022  Cervical Cancer Screening: DUE      Social History     Social History Narrative    Not on file       Family History   Problem Relation Age of Onset    Drug abuse Mother     COPD Mother     Arthritis Mother     Alcohol abuse Mother     Drug abuse Father     Alcohol abuse Father     Heart attack Father         in his 60's    Coronary artery disease Father     Arthritis Sister     Heart failure Maternal Grandmother     Diabetes Maternal Grandmother     Diverticulitis Maternal Grandmother     Kidney failure Maternal Grandfather     Diabetes Maternal Grandfather     Heart attack Maternal Grandfather     Colon cancer Neg Hx     Breast cancer Neg Hx     Ovarian cancer Neg Hx     Stroke Neg Hx     Osteoporosis Neg Hx        Current Outpatient Medications:     b complex vitamins capsule, Take 1 capsule by mouth once daily., Disp: , Rfl:     carbamide peroxide (DEBROX) 6.5 % otic solution, 5 drops 2 (two) times daily., Disp: , Rfl:     sertraline (ZOLOFT) 50 MG tablet, Take 50 mg by mouth once daily., Disp: , Rfl:     vitamin D (VITAMIN D3) 1000 units Tab, Take 1,000 Units by mouth once daily., Disp: , Rfl:     acyclovir 5% (ZOVIRAX) 5 % Crea, Apply sparingly at first sign of outbreak five times daily for 4 days, Disp: , Rfl:     diclofenac sodium (VOLTAREN) 1 % Gel, Apply 2 g topically 4 (four) times  "daily. (Patient not taking: Reported on 6/5/2023), Disp: 100 g, Rfl: 3    mometasone (ELOCON) 0.1 % solution, Apply topically 2 (two) times daily., Disp: , Rfl:     neomycin-polymyxin-hydrocortisone (CORTISPORIN) 3.5-10,000-1 mg/mL-unit/mL-% otic suspension, Place 3 drops into the left ear 3 (three) times daily., Disp: 10 mL, Rfl: 0    valACYclovir (VALTREX) 500 MG tablet, Take 500 mg by mouth 2 (two) times daily., Disp: , Rfl:     Review of Systems   Constitutional:  Negative for chills and fever.   HENT:  Positive for ear pain.    Respiratory:  Negative for cough and shortness of breath.    Cardiovascular:  Negative for chest pain.   Gastrointestinal:  Negative for abdominal pain.   Skin:  Negative for rash.   Neurological:  Negative for dizziness.     Objective:   BP 94/60 (BP Location: Right arm, Patient Position: Sitting, BP Method: Small (Manual))   Pulse 80   Ht 5' 10" (1.778 m)   Wt 68.5 kg (150 lb 14.5 oz)   SpO2 100%   BMI 21.65 kg/m²      Physical Exam  Vitals reviewed.   Constitutional:       Appearance: She is well-developed.   HENT:      Head: Normocephalic and atraumatic.      Right Ear: Tympanic membrane and ear canal normal.      Left Ear: There is impacted cerumen.      Ears:      Comments: Erythema of canal on left  Eyes:      Conjunctiva/sclera: Conjunctivae normal.   Cardiovascular:      Rate and Rhythm: Normal rate.   Pulmonary:      Effort: Pulmonary effort is normal. No respiratory distress.   Skin:     General: Skin is warm and dry.      Findings: No rash.   Neurological:      Mental Status: She is alert and oriented to person, place, and time.      Coordination: Coordination normal.   Psychiatric:         Behavior: Behavior normal.       Assessment & Plan     Problem List Items Addressed This Visit          ENT    Impacted cerumen of left ear - Primary    Current Assessment & Plan     Attempted cerumen removal with irrigation. Some was removed however due to discomfort she was unable " to tolerate further irrigation. Referred to ENT but provided cortisporin to use in meantime to hopefully help with symptoms         Relevant Orders    Ambulatory referral/consult to ENT         Immunizations Administered on Date of Encounter - 6/5/2023       No immunizations on file.             No follow-ups on file.      Disclaimer:  This note may have been prepared using voice recognition software, it may have not been extensively proofed, as such there could be errors within the text such as sound alike errors.

## 2023-06-29 ENCOUNTER — HOSPITAL ENCOUNTER (OUTPATIENT)
Dept: RADIOLOGY | Facility: OTHER | Age: 53
Discharge: HOME | End: 2023-06-29
Attending: STUDENT IN AN ORGANIZED HEALTH CARE EDUCATION/TRAINING PROGRAM
Payer: COMMERCIAL

## 2023-06-29 DIAGNOSIS — Z12.31 SCREENING MAMMOGRAM FOR BREAST CANCER: ICD-10-CM

## 2023-06-29 PROCEDURE — 77067 SCR MAMMO BI INCL CAD: CPT | Mod: TC

## 2023-06-29 PROCEDURE — 77067 MAMMO DIGITAL SCREENING BILAT WITH TOMO: ICD-10-PCS | Mod: 26,,, | Performed by: RADIOLOGY

## 2023-06-29 PROCEDURE — 77063 BREAST TOMOSYNTHESIS BI: CPT | Mod: 26,,, | Performed by: RADIOLOGY

## 2023-06-29 PROCEDURE — 77067 SCR MAMMO BI INCL CAD: CPT | Mod: 26,,, | Performed by: RADIOLOGY

## 2023-06-29 PROCEDURE — 77063 MAMMO DIGITAL SCREENING BILAT WITH TOMO: ICD-10-PCS | Mod: 26,,, | Performed by: RADIOLOGY

## 2023-07-06 RX ORDER — SERTRALINE HYDROCHLORIDE 50 MG/1
50 TABLET, FILM COATED ORAL DAILY
Qty: 90 TABLET | Refills: 3 | Status: SHIPPED | OUTPATIENT
Start: 2023-07-06

## 2023-07-06 NOTE — TELEPHONE ENCOUNTER
No care due was identified.  [facility] Embedded Care Due Messages. Reference number: [id number].   7/06/2023 2:46:05 PM CDT

## 2023-07-06 NOTE — TELEPHONE ENCOUNTER
----- Message from Chanel Everett sent at 7/6/2023  2:32 PM CDT -----  Regarding: refill  Who Called:  LIZABETH CORTÉS [[id number]]    Refill or New Rx: Refill        RX Name and Strength  sertraline (ZOLOFT) 50 MG tablet    Is this a 30 day or 90 day RX:      Preferred Pharmacy with phone number:  [id number] - [place], [place] - [address] AT [facility]   Phone:  [phone]  Fax:  [phone]            Local or Mail Order:local       Would the patient rather a call back or a response via [facility]?      best Call Back Number:  [phone]    Additional Information:

## 2023-08-16 ENCOUNTER — TELEPHONE (OUTPATIENT)
Dept: INTERNAL MEDICINE | Facility: CLINIC | Age: 53
End: 2023-08-16
Payer: COMMERCIAL

## 2023-08-16 NOTE — TELEPHONE ENCOUNTER
----- Message from Inez Murillo MA sent at 8/16/2023  3:06 PM CDT -----  Regarding: Refill Request  Who Called:LIZABETH CORTÉS [[id number]]           New Prescription or Refill : Refill      RX Name and Strength:  sertraline (ZOLOFT) 50 MG tablet         30 day or 90 day RX:30           Local or Mail Order : local            Preferred Pharmacy:[id number] - [place], [place] - [address] AT [facility]      Would the patient rather a call back or a response via [facility]? call        Best Call Back Number:  [phone]

## 2023-10-20 ENCOUNTER — OFFICE VISIT (OUTPATIENT)
Dept: INTERNAL MEDICINE | Facility: CLINIC | Age: 53
End: 2023-10-20
Payer: COMMERCIAL

## 2023-10-20 VITALS
WEIGHT: 140.88 LBS | BODY MASS INDEX: 20.17 KG/M2 | DIASTOLIC BLOOD PRESSURE: 72 MMHG | SYSTOLIC BLOOD PRESSURE: 102 MMHG | HEIGHT: 70 IN | OXYGEN SATURATION: 99 % | HEART RATE: 85 BPM

## 2023-10-20 DIAGNOSIS — H65.192 ACUTE MUCOID OTITIS MEDIA OF LEFT EAR: ICD-10-CM

## 2023-10-20 DIAGNOSIS — H81.12 BENIGN PAROXYSMAL POSITIONAL VERTIGO OF LEFT EAR: ICD-10-CM

## 2023-10-20 DIAGNOSIS — R42 VERTIGO: ICD-10-CM

## 2023-10-20 DIAGNOSIS — F41.1 GENERALIZED ANXIETY DISORDER: Primary | ICD-10-CM

## 2023-10-20 PROCEDURE — 3074F PR MOST RECENT SYSTOLIC BLOOD PRESSURE < 130 MM HG: ICD-10-PCS | Mod: CPTII,S$GLB,, | Performed by: PHYSICIAN ASSISTANT

## 2023-10-20 PROCEDURE — 3044F HG A1C LEVEL LT 7.0%: CPT | Mod: CPTII,S$GLB,, | Performed by: PHYSICIAN ASSISTANT

## 2023-10-20 PROCEDURE — 3044F PR MOST RECENT HEMOGLOBIN A1C LEVEL <7.0%: ICD-10-PCS | Mod: CPTII,S$GLB,, | Performed by: PHYSICIAN ASSISTANT

## 2023-10-20 PROCEDURE — 3078F PR MOST RECENT DIASTOLIC BLOOD PRESSURE < 80 MM HG: ICD-10-PCS | Mod: CPTII,S$GLB,, | Performed by: PHYSICIAN ASSISTANT

## 2023-10-20 PROCEDURE — 3078F DIAST BP <80 MM HG: CPT | Mod: CPTII,S$GLB,, | Performed by: PHYSICIAN ASSISTANT

## 2023-10-20 PROCEDURE — 3074F SYST BP LT 130 MM HG: CPT | Mod: CPTII,S$GLB,, | Performed by: PHYSICIAN ASSISTANT

## 2023-10-20 PROCEDURE — 99999 PR PBB SHADOW E&M-EST. PATIENT-LVL III: CPT | Mod: PBBFAC,,, | Performed by: PHYSICIAN ASSISTANT

## 2023-10-20 PROCEDURE — 3008F BODY MASS INDEX DOCD: CPT | Mod: CPTII,S$GLB,, | Performed by: PHYSICIAN ASSISTANT

## 2023-10-20 PROCEDURE — 99999 PR PBB SHADOW E&M-EST. PATIENT-LVL III: ICD-10-PCS | Mod: PBBFAC,,, | Performed by: PHYSICIAN ASSISTANT

## 2023-10-20 PROCEDURE — 3008F PR BODY MASS INDEX (BMI) DOCUMENTED: ICD-10-PCS | Mod: CPTII,S$GLB,, | Performed by: PHYSICIAN ASSISTANT

## 2023-10-20 PROCEDURE — 99214 PR OFFICE/OUTPT VISIT, EST, LEVL IV, 30-39 MIN: ICD-10-PCS | Mod: S$GLB,,, | Performed by: PHYSICIAN ASSISTANT

## 2023-10-20 PROCEDURE — 99214 OFFICE O/P EST MOD 30 MIN: CPT | Mod: S$GLB,,, | Performed by: PHYSICIAN ASSISTANT

## 2023-10-20 RX ORDER — FLUTICASONE PROPIONATE 50 MCG
1 SPRAY, SUSPENSION (ML) NASAL DAILY
Qty: 11.1 ML | Refills: 0 | Status: SHIPPED | OUTPATIENT
Start: 2023-10-20

## 2023-10-20 RX ORDER — MECLIZINE HYDROCHLORIDE 25 MG/1
25 TABLET ORAL 3 TIMES DAILY PRN
Qty: 30 TABLET | Refills: 0 | Status: SHIPPED | OUTPATIENT
Start: 2023-10-20 | End: 2023-11-19

## 2023-10-20 RX ORDER — AMOXICILLIN 500 MG/1
500 CAPSULE ORAL EVERY 12 HOURS
Qty: 20 CAPSULE | Refills: 0 | Status: SHIPPED | OUTPATIENT
Start: 2023-10-20 | End: 2023-10-30

## 2023-10-20 NOTE — PROGRESS NOTES
INTERNAL MEDICINE URGENT VISIT NOTE    CHIEF COMPLAINT     Chief Complaint   Patient presents with    Dizziness    Labs Only       HPI     Liza Byrne is a 53 y.o. female who presents for an urgent visit today.    PCP is Yelitza Garvin MD, patient is new to me.     Vertigo for the past 7 days.  History of work up in [facility] 4 years ago   Had some relief with eply maneuvers in the past.   Also has right sided head pain that comes and goes    Drinks a lot of water daily   No etoh  One cup of coffee daily   Just returned from [place]  Not a good sleeper in general- light sleeper  Noticing some vision changes in the past week - but also lost her prescription lenses       Past Medical History:  History reviewed. No pertinent past medical history.    Home Medications:  Prior to Admission medications    Medication Sig Start Date End Date Taking? Authorizing Provider   b complex vitamins capsule Take 1 capsule by mouth once daily.    Provider, Historical   carbamide peroxide (DEBROX) 6.5 % otic solution 5 drops 2 (two) times daily.    Provider, Historical   diclofenac sodium (VOLTAREN) 1 % Gel Apply 2 g topically 4 (four) times daily.  Patient not taking: Reported on 6/5/2023 5/19/23   Yelitza Garvin MD   mometasone (ELOCON) 0.1 % solution Apply topically 2 (two) times daily. 3/29/22   Provider, Historical   neomycin-polymyxin-hydrocortisone (CORTISPORIN) 3.5-10,000-1 mg/mL-unit/mL-% otic suspension Place 3 drops into the left ear 3 (three) times daily. 6/5/23   Niels Escobar,    sertraline (ZOLOFT) 50 MG tablet Take 1 tablet (50 mg total) by mouth once daily. 7/6/23   Yelitza Garvin MD   valACYclovir (VALTREX) 500 MG tablet Take 500 mg by mouth 2 (two) times daily. 9/29/22   Provider, Historical   vitamin D (VITAMIN D3) 1000 units Tab Take 1,000 Units by mouth once daily.    Provider, Historical       Review of Systems:  Review of Systems   Constitutional:  Negative for chills and fever.   HENT:   "Negative for sore throat and trouble swallowing.    Eyes:  Negative for visual disturbance.   Respiratory:  Negative for cough and shortness of breath.    Cardiovascular:  Negative for chest pain.   Gastrointestinal:  Negative for abdominal pain, constipation, diarrhea, nausea and vomiting.   Genitourinary:  Negative for dysuria and flank pain.   Musculoskeletal:  Negative for back pain, neck pain and neck stiffness.   Skin:  Negative for rash.   Neurological:  Positive for dizziness and headaches. Negative for syncope and weakness.   Psychiatric/Behavioral:  Negative for confusion.        Health Maintainence:   Immunizations:  Health Maintenance         Date Due Completion Date    Cervical Cancer Screening Never done ---    Pneumococcal Vaccines (Age 0-64) (1 - PCV) Never done ---    Shingles Vaccine (1 of 2) Never done ---    Influenza Vaccine (1) 09/01/2023 10/6/2017    COVID-19 Vaccine (3 - 2023-24 season) 09/01/2023 12/1/2021    Mammogram 06/29/2024 6/29/2023    TETANUS VACCINE 09/28/2027 9/28/2017    Lipid Panel 05/19/2028 5/19/2023    Colorectal Cancer Screening 12/16/2032 12/16/2022             PHYSICAL EXAM     /72 (BP Location: Left arm, Patient Position: Sitting, BP Method: Large (Manual))   Pulse 85   Ht 5' 10" (1.778 m)   Wt 63.9 kg (140 lb 14 oz)   LMP 10/06/2023 (Approximate)   SpO2 99%   BMI 20.21 kg/m²     Physical Exam  Vitals and nursing note reviewed.   Constitutional:       Appearance: Normal appearance.      Comments: Healthy appearing female in NAD or apparent pain. She makes good eye contact, speaks in clear full sentences and ambulates with ease.        HENT:      Head: Normocephalic and atraumatic.      Ears:      Comments: Left TM cloudy and bulging with some erythema.   Canal is clear  There is no mastoid TTP    Right TM slightly retracted when compared to the left.      Nose: Nose normal.      Mouth/Throat:      Pharynx: Oropharynx is clear.   Eyes:      Conjunctiva/sclera: " Conjunctivae normal.      Comments: Nystagmus with position change from seated to laying.    Cardiovascular:      Rate and Rhythm: Normal rate and regular rhythm.      Pulses: Normal pulses.   Pulmonary:      Effort: No respiratory distress.   Abdominal:      Tenderness: There is no abdominal tenderness.   Musculoskeletal:         General: Normal range of motion.      Cervical back: No rigidity.   Skin:     General: Skin is warm and dry.      Capillary Refill: Capillary refill takes less than 2 seconds.      Findings: No rash.   Neurological:      General: No focal deficit present.      Mental Status: She is alert.      Gait: Gait normal.   Psychiatric:         Mood and Affect: Mood normal.         LABS     Lab Results   Component Value Date    HGBA1C 5.2 05/19/2023     CMP  Sodium   Date Value Ref Range Status   05/19/2023 140 136 - 145 mmol/L Final     Potassium   Date Value Ref Range Status   05/19/2023 3.7 3.5 - 5.1 mmol/L Final     Chloride   Date Value Ref Range Status   05/19/2023 106 95 - 110 mmol/L Final     CO2   Date Value Ref Range Status   05/19/2023 27 23 - 29 mmol/L Final     Glucose   Date Value Ref Range Status   05/19/2023 68 (L) 70 - 110 mg/dL Final     BUN   Date Value Ref Range Status   05/19/2023 10 6 - 20 mg/dL Final     Creatinine   Date Value Ref Range Status   05/19/2023 0.8 0.5 - 1.4 mg/dL Final     Calcium   Date Value Ref Range Status   05/19/2023 9.7 8.7 - 10.5 mg/dL Final     Total Protein   Date Value Ref Range Status   05/19/2023 7.0 6.0 - 8.4 g/dL Final     Albumin   Date Value Ref Range Status   05/19/2023 4.4 3.5 - 5.2 g/dL Final     Total Bilirubin   Date Value Ref Range Status   05/19/2023 0.6 0.1 - 1.0 mg/dL Final     Comment:     For infants and newborns, interpretation of results should be based  on gestational age, weight and in agreement with clinical  observations.    Premature Infant recommended reference ranges:  Up to 24 hours.............<8.0 mg/dL  Up to 48  hours............<12.0 mg/dL  3-5 days..................<15.0 mg/dL  6-29 days.................<15.0 mg/dL       Alkaline Phosphatase   Date Value Ref Range Status   05/19/2023 54 (L) 55 - 135 U/L Final     AST   Date Value Ref Range Status   05/19/2023 16 10 - 40 U/L Final     ALT   Date Value Ref Range Status   05/19/2023 13 10 - 44 U/L Final     Anion Gap   Date Value Ref Range Status   05/19/2023 7 (L) 8 - 16 mmol/L Final     Lab Results   Component Value Date    WBC 8.07 05/19/2023    HGB 12.5 05/19/2023    HCT 38.6 05/19/2023    MCV 92 05/19/2023     05/19/2023     Lab Results   Component Value Date    CHOL 169 05/19/2023     Lab Results   Component Value Date    HDL 46 05/19/2023     Lab Results   Component Value Date    LDLCALC 107.0 05/19/2023     Lab Results   Component Value Date    TRIG 80 05/19/2023     Lab Results   Component Value Date    CHOLHDL 27.2 05/19/2023     Lab Results   Component Value Date    TSH 0.704 05/19/2023       ASSESSMENT/PLAN     Liza Byrne is a 53 y.o. female     Liza was seen today for dizziness.     Diagnoses and all orders for this visit:    Generalized anxiety disorder    Vertigo    Acute mucoid otitis media of left ear    Benign paroxysmal positional vertigo of left ear    Other orders  -     amoxicillin (AMOXIL) 500 MG capsule; Take 1 capsule (500 mg total) by mouth every 12 (twelve) hours. for 10 days  -     fluticasone propionate (FLONASE) 50 mcg/actuation nasal spray; 1 spray (50 mcg total) by Each Nostril route once daily.  -     meclizine (ANTIVERT) 25 mg tablet; Take 1 tablet (25 mg total) by mouth 3 (three) times daily as needed for Dizziness.        Patient was counseled on when and how to seek emergent care.       Dorina Liang PA-C   Department of Internal Medicine - [facility]   12:03 PM

## 2023-11-15 ENCOUNTER — PATIENT OUTREACH (OUTPATIENT)
Dept: ADMINISTRATIVE | Facility: HOSPITAL | Age: 53
End: 2023-11-15
Payer: COMMERCIAL

## 2023-11-15 ENCOUNTER — PATIENT MESSAGE (OUTPATIENT)
Dept: ADMINISTRATIVE | Facility: HOSPITAL | Age: 53
End: 2023-11-15
Payer: COMMERCIAL

## 2023-11-16 ENCOUNTER — TELEPHONE (OUTPATIENT)
Dept: INTERNAL MEDICINE | Facility: CLINIC | Age: 53
End: 2023-11-16
Payer: COMMERCIAL

## 2023-11-16 DIAGNOSIS — N95.1 PERIMENOPAUSAL: Primary | ICD-10-CM

## 2023-11-16 NOTE — TELEPHONE ENCOUNTER
----- Message from Sabine Bear MA sent at 11/15/2023  3:54 PM CST -----  Regarding: Hormonal Check  Ms. De Jesus believes she is going through menopause and would like to have orders placed for hormonal check. Pt states she is having a very hard time at the moment.

## 2023-11-17 NOTE — TELEPHONE ENCOUNTER
Schedule pt for lab on 11/20/23. Pt declined the appt with Dr. Dixon and states that she only wants to do the labs right now.

## 2023-11-20 ENCOUNTER — LAB VISIT (OUTPATIENT)
Dept: LAB | Facility: OTHER | Age: 53
End: 2023-11-20
Attending: STUDENT IN AN ORGANIZED HEALTH CARE EDUCATION/TRAINING PROGRAM
Payer: COMMERCIAL

## 2023-11-20 DIAGNOSIS — N95.1 PERIMENOPAUSAL: ICD-10-CM

## 2023-11-20 LAB
FSH SERPL-ACNC: 6.97 MIU/ML
LH SERPL-ACNC: 5.1 MIU/ML

## 2023-11-20 PROCEDURE — 36415 COLL VENOUS BLD VENIPUNCTURE: CPT | Performed by: STUDENT IN AN ORGANIZED HEALTH CARE EDUCATION/TRAINING PROGRAM

## 2023-11-20 PROCEDURE — 83001 ASSAY OF GONADOTROPIN (FSH): CPT | Performed by: STUDENT IN AN ORGANIZED HEALTH CARE EDUCATION/TRAINING PROGRAM

## 2023-11-20 PROCEDURE — 83002 ASSAY OF GONADOTROPIN (LH): CPT | Performed by: STUDENT IN AN ORGANIZED HEALTH CARE EDUCATION/TRAINING PROGRAM

## 2023-12-05 ENCOUNTER — PATIENT OUTREACH (OUTPATIENT)
Dept: BEHAVIORAL HEALTH | Facility: CLINIC | Age: 53
End: 2023-12-05
Payer: COMMERCIAL

## 2023-12-05 ENCOUNTER — TELEPHONE (OUTPATIENT)
Dept: BEHAVIORAL HEALTH | Facility: CLINIC | Age: 53
End: 2023-12-05
Payer: COMMERCIAL

## 2023-12-05 ENCOUNTER — PATIENT MESSAGE (OUTPATIENT)
Dept: ADMINISTRATIVE | Facility: HOSPITAL | Age: 53
End: 2023-12-05
Payer: COMMERCIAL

## 2023-12-05 ENCOUNTER — PATIENT MESSAGE (OUTPATIENT)
Dept: BEHAVIORAL HEALTH | Facility: CLINIC | Age: 53
End: 2023-12-05
Payer: COMMERCIAL

## 2023-12-05 ENCOUNTER — PATIENT OUTREACH (OUTPATIENT)
Dept: ADMINISTRATIVE | Facility: HOSPITAL | Age: 53
End: 2023-12-05
Payer: COMMERCIAL

## 2023-12-05 DIAGNOSIS — F41.1 GENERALIZED ANXIETY DISORDER: Primary | ICD-10-CM

## 2023-12-05 PROCEDURE — 99484 CARE MGMT SVC BHVL HLTH COND: CPT | Mod: S$GLB,,, | Performed by: SOCIAL WORKER

## 2023-12-05 NOTE — PROGRESS NOTES
Behavioral Health Community Health Worker  Initial Assessment  Completed by:  Danielle Luque    Date:  12/6/2023    Patient Enrollment in Behavioral Health Program:  Patient verbalized understanding of Behavioral Health Integration services to include:  Patient understands that CHW, LCSW, PharmD and consulting Psychiatrist are members of the care team working collaboratively with his/her primary care provider: Yes  Patient understands that activation of their [facility] patient portal account is required for accessing the full scope of team services: Yes  Patient understands that some counseling sessions may occur via video: Yes  Clinic visits with the psychiatrist may be subject to a co-pay based on your insurance: Yes  Patient consents to enroll in BHI program: Yes    Assessments     Single Item Health Literacy Scale:  How often do you need to have someone help you read instructions, pamphlets or other written material from your doctor or pharmacy?: Never      Depression PHQ:      12/5/2023    11:01 AM   PHQ9   Little interest or pleasure in doing things 2   Feeling down, depressed, or hopeless 2   Trouble falling or staying asleep, or sleeping too much 2   Feeling tired or having little energy 2   Poor appetite or overeating 2   Feeling bad about yourself - or that you are a failure or have let yourself or your family down 1   Trouble concentrating on things, such as reading the newspaper or watching television 2   Moving or speaking so slowly that other people could have noticed. Or the opposite - being so fidgety or restless that you have been moving around a lot more than usual 2   PHQ-9 Total Score 15         Generalized Anxiety Disorder 7-Item Scale:      12/5/2023    11:01 AM   GAD7   1. Feeling nervous, anxious, or on edge? 1   2. Not being able to stop or control worrying? 1   3. Worrying too much about different things? 1   4. Trouble relaxing? 2   5. Being so restless that it is hard to sit still? 2   6.  Becoming easily annoyed or irritable? 2   7. Feeling afraid as if something awful might happen? 0   ERIN-7 Score 9       History     Social History     Socioeconomic History    Marital status:    Tobacco Use    Smoking status: Former     Current packs/day: 0.00     Average packs/day: 1 pack/day for 5.0 years (5.0 ttl pk-yrs)     Types: Cigarettes     Start date: 2012     Quit date: 2017     Years since quittin.9     Passive exposure: Never    Smokeless tobacco: Never   Substance and Sexual Activity    Alcohol use: Not Currently    Drug use: Never    Sexual activity: Yes     Partners: Male     Birth control/protection: None       Call Summary     Patient called my office line to inquire about [facility]. Patient states she has anxiety and takes medication (Sertraline 50mg QD) for this. Patient is interested in having someone to talk to and understands our program is short term and solution focused. Patient does not have a referral to our program but has an older referral to Psychology dept and was advised CHW would reach out to her PCP to get a referral to this program. Patient scheduled for initial visit 23.

## 2023-12-20 ENCOUNTER — PATIENT MESSAGE (OUTPATIENT)
Dept: BEHAVIORAL HEALTH | Facility: CLINIC | Age: 53
End: 2023-12-20
Payer: COMMERCIAL

## 2023-12-20 ENCOUNTER — OFFICE VISIT (OUTPATIENT)
Dept: BEHAVIORAL HEALTH | Facility: CLINIC | Age: 53
End: 2023-12-20
Attending: INTERNAL MEDICINE
Payer: COMMERCIAL

## 2023-12-20 DIAGNOSIS — F41.1 GENERALIZED ANXIETY DISORDER: ICD-10-CM

## 2023-12-20 PROCEDURE — 3044F PR MOST RECENT HEMOGLOBIN A1C LEVEL <7.0%: ICD-10-PCS | Mod: CPTII,95,, | Performed by: SOCIAL WORKER

## 2023-12-20 PROCEDURE — 90791 PSYCH DIAGNOSTIC EVALUATION: CPT | Mod: 95,,, | Performed by: SOCIAL WORKER

## 2023-12-20 PROCEDURE — 90791 PR PSYCHIATRIC DIAGNOSTIC EVALUATION: ICD-10-PCS | Mod: 95,,, | Performed by: SOCIAL WORKER

## 2023-12-20 PROCEDURE — 3044F HG A1C LEVEL LT 7.0%: CPT | Mod: CPTII,95,, | Performed by: SOCIAL WORKER
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
## 2024-03-01 ENCOUNTER — OFFICE VISIT (OUTPATIENT)
Dept: INTERNAL MEDICINE | Facility: CLINIC | Age: 54
End: 2024-03-01
Payer: COMMERCIAL

## 2024-03-01 ENCOUNTER — TELEPHONE (OUTPATIENT)
Dept: INTERNAL MEDICINE | Facility: CLINIC | Age: 54
End: 2024-03-01

## 2024-03-01 ENCOUNTER — TELEPHONE (OUTPATIENT)
Dept: INTERNAL MEDICINE | Facility: CLINIC | Age: 54
End: 2024-03-01
Payer: COMMERCIAL

## 2024-03-01 DIAGNOSIS — G43.009 MIGRAINE WITHOUT AURA AND WITHOUT STATUS MIGRAINOSUS, NOT INTRACTABLE: Primary | ICD-10-CM

## 2024-03-01 DIAGNOSIS — G89.29 CHRONIC BACK PAIN, UNSPECIFIED BACK LOCATION, UNSPECIFIED BACK PAIN LATERALITY: ICD-10-CM

## 2024-03-01 DIAGNOSIS — N92.0 MENORRHAGIA WITH REGULAR CYCLE: ICD-10-CM

## 2024-03-01 DIAGNOSIS — J30.1 SEASONAL ALLERGIC RHINITIS DUE TO POLLEN: ICD-10-CM

## 2024-03-01 DIAGNOSIS — M54.9 CHRONIC BACK PAIN, UNSPECIFIED BACK LOCATION, UNSPECIFIED BACK PAIN LATERALITY: ICD-10-CM

## 2024-03-01 DIAGNOSIS — F17.210 CIGARETTE SMOKER: ICD-10-CM

## 2024-03-01 DIAGNOSIS — M54.2 NECK PAIN: ICD-10-CM

## 2024-03-01 PROCEDURE — 99215 OFFICE O/P EST HI 40 MIN: CPT | Mod: 95,,, | Performed by: INTERNAL MEDICINE

## 2024-03-01 PROCEDURE — 1159F MED LIST DOCD IN RCRD: CPT | Mod: CPTII,95,, | Performed by: INTERNAL MEDICINE

## 2024-03-01 PROCEDURE — 1160F RVW MEDS BY RX/DR IN RCRD: CPT | Mod: CPTII,95,, | Performed by: INTERNAL MEDICINE

## 2024-03-01 RX ORDER — SUMATRIPTAN 50 MG/1
50 TABLET, FILM COATED ORAL
Qty: 9 TABLET | Refills: 5 | Status: SHIPPED | OUTPATIENT
Start: 2024-03-01 | End: 2024-06-11 | Stop reason: ALTCHOICE

## 2024-03-01 NOTE — TELEPHONE ENCOUNTER
----- Message from Marlyn Juarez MD sent at 3/1/2024 12:31 PM CST -----  1. I ordered a referral to Neuro/headache clinic.  Please assist the patient with scheduling.  Thank you!

## 2024-03-01 NOTE — TELEPHONE ENCOUNTER
----- Message from Inez Murillo MA sent at 2/29/2024  2:05 PM CST -----  Name of Who is Calling:LIZABETH CORTÉS [[id number]]                What is the request in detail: Pt is requesting GETTING SOMETHING CALLED IN FOR MIGRAINES sent to [id number] - [place], [place] - [address] AT [facility]. Pt also gets cold sores and would like Zovira.  Please assist.                Can the clinic reply by [facility]: No                What Number to Call Back if not in [facility]: [phone]

## 2024-03-01 NOTE — TELEPHONE ENCOUNTER
----- Message from Marlyn Juarez MD sent at 3/1/2024 12:58 PM CST -----  1. Please schedule an annual with her PCP.  Thank you!

## 2024-03-01 NOTE — PROGRESS NOTES
The patient location is: [place]  The chief complaint leading to consultation is: Headache    Visit type: Virtual visit with synchronous audio and video  Contact time spent with patient: 27 minutes  Each patient to whom he or she provides medical services by telemedicine is:  (1) informed of the relationship between the physician and patient and the respective role of any other health care provider with respect to management of the patient; and (2) notified that he or she may decline to receive medical services by telemedicine and may withdraw from such care at any time.    Subjective:       Patient ID: Liza Byrne is a 53 y.o. female who  has no past medical history on file.    Chief Complaint: Headache     History was obtained from the patient and supplemented through chart review.  She is a patient of Yelitza Garvin MD.  Was last seen 5/2023 for anxiety, establish care.    HPI    Migraine without aura:   HA for about 1 year ago.      Always occurs before her menstrual cycle.  Starts in the evening.     Has regular menstrual cycles monthly.  Menstrual cycles are heavier now.   Not on contraception.  Didn't like OCPs in the past. Didn't like how she felt on it.  Tobacco: yes. Motivated to quit.  Gave birth to her daughter at age 47. No h/o fertility tx.  Sees private OBGYN at [facility].  Had labs and thought not to be premenopausal.    Gradual onset HA. Lasts 2 days. Occurs 1/mo.  Currently has HA, but improving.    Involves B/l occipital, parietal. Skin of frontal area will feel sensitive.  +photophobia, phonophobia. Some mild nausea. No vomiting.     No triggers other than menstrual cycle.   No ETOH for years.  Chronic stress.     Will lay down, place ice packs around her head.   Took her sister's med for HA recently (unknown, in a blister pack), which resolved.    +neck pain. She is a writer, lots of computer use. Has her laptop raised up.    HA not worsened by recumbency.      Has chronic environmental  allergies.  Worse when it's windy, pollen/spring.  Moved to [facility] .    Taking tylenol, ibuprofen 2 tabs daily d/t back pain s relief. Seeing OSH Ortho Dr. Butler, PT. Admits that she could do more HEP at home.          Not addressed today.  ERIN:  Taking Zoloft. Seeing Psychology.    Review of Systems   Constitutional:  Negative for activity change and unexpected weight change.   HENT:  Negative for hearing loss, rhinorrhea and trouble swallowing.    Eyes:  Positive for visual disturbance. Negative for discharge.   Respiratory:  Negative for chest tightness and wheezing.    Cardiovascular:  Negative for chest pain and palpitations.   Gastrointestinal:  Negative for blood in stool, constipation, diarrhea and vomiting.   Endocrine: Negative for polydipsia and polyuria.   Genitourinary:  Negative for difficulty urinating, dysuria, hematuria and menstrual problem.   Musculoskeletal:  Positive for arthralgias and neck pain. Negative for joint swelling.   Neurological:  Positive for headaches. Negative for weakness.   Psychiatric/Behavioral:  Positive for dysphoric mood. Negative for confusion.        History reviewed. No pertinent past medical history.  Past Surgical History:   Procedure Laterality Date    AUGMENTATION OF BREAST      BREAST SURGERY       SECTION  10/17/2017    CHONDROPLASTY OF KNEE  09/15/2022    Procedure: CHONDROPLASTY, KNEE;  Surgeon: Josiah Butler MD;  Location: [facility] OR;  Service: Orthopedics;;    COSMETIC SURGERY      EXCISION OF MEDIAL MENISCUS OF KNEE  09/15/2022    Procedure: MENISCECTOMY, KNEE, MEDIAL;  Surgeon: Josiah Butlre MD;  Location: [facility] OR;  Service: Orthopedics;;    KNEE ARTHROSCOPY W/ ACL RECONSTRUCTION Right 09/15/2022    Procedure: RECONSTRUCTION, KNEE, ACL, ARTHROSCOPIC;  Surgeon: Josiah Butler MD;  Location: [facility] OR;  Service: Orthopedics;  Laterality: Right;  [facility] ACL and meniscal repair instruments, BTB allograft in freezer,  interference screws, large frag set with washers  Linvatec confirmed Cw     KNEE ARTHROSCOPY W/ MENISCECTOMY Right 2022    Procedure: Right knee arthroscopy, partial meniscectomy, bone grafting of old ACL tunnels;  Surgeon: Josiah Butler MD;  Location: [facility];  Service: Orthopedics;  Laterality: Right;  Linvatec ACL instrumentation, Cloward bone graft dowels    MAMMO BREAST STEREOTACTIC BREAST BIOPSY RIGHT       Family History   Problem Relation Age of Onset    Drug abuse Mother     COPD Mother     Arthritis Mother     Alcohol abuse Mother     Drug abuse Father     Alcohol abuse Father     Heart attack Father         in his 60's    Coronary artery disease Father     Arthritis Sister     Heart failure Maternal Grandmother     Diabetes Maternal Grandmother     Diverticulitis Maternal Grandmother     Kidney failure Maternal Grandfather     Diabetes Maternal Grandfather     Heart attack Maternal Grandfather     Colon cancer Neg Hx     Breast cancer Neg Hx     Ovarian cancer Neg Hx     Stroke Neg Hx     Osteoporosis Neg Hx      Social History     Socioeconomic History    Marital status:    Tobacco Use    Smoking status: Former     Current packs/day: 0.00     Average packs/day: 1 pack/day for 5.0 years (5.0 ttl pk-yrs)     Types: Cigarettes     Start date: 2012     Quit date: 2017     Years since quittin.1     Passive exposure: Never    Smokeless tobacco: Never   Substance and Sexual Activity    Alcohol use: Not Currently    Drug use: Never    Sexual activity: Yes     Partners: Male     Birth control/protection: None     Objective:      There were no vitals filed for this visit.   Physical Exam  Constitutional:       General: She is not in acute distress.     Appearance: She is well-developed. She is not ill-appearing or diaphoretic.   HENT:      Head: Normocephalic and atraumatic.   Eyes:      General:         Right eye: No discharge.         Left eye: No discharge.   Pulmonary:       Effort: Pulmonary effort is normal. No respiratory distress.   Skin:     Coloration: Skin is not pale.      Findings: No erythema.   Neurological:      Mental Status: She is alert.   Psychiatric:         Behavior: Behavior normal.         Lab Results   Component Value Date    WBC 8.07 05/19/2023    HGB 12.5 05/19/2023    HCT 38.6 05/19/2023     05/19/2023    CHOL 169 05/19/2023    TRIG 80 05/19/2023    HDL 46 05/19/2023    ALT 13 05/19/2023    AST 16 05/19/2023     05/19/2023    K 3.7 05/19/2023     05/19/2023    CREATININE 0.8 05/19/2023    BUN 10 05/19/2023    CO2 27 05/19/2023    TSH 0.704 05/19/2023    HGBA1C 5.2 05/19/2023       The 10-year ASCVD risk score (Kavita SARKAR, et al., 2019) is: 0.9%    Values used to calculate the score:      Age: 53 years      Sex: Female      Is Non- : No      Diabetic: No      Tobacco smoker: No      Systolic Blood Pressure: 102 mmHg      Is BP treated: No      HDL Cholesterol: 46 mg/dL      Total Cholesterol: 169 mg/dL    Assessment:       1. Migraine without aura and without status migrainosus, not intractable    2. Menorrhagia with regular cycle    3. Cigarette smoker    4. Chronic back pain, unspecified back location, unspecified back pain laterality    5. Neck pain    6. Seasonal allergic rhinitis due to pollen          Plan:       Liza was seen today for headache.    Diagnoses and all orders for this visit:    Migraine without aura and without status migrainosus, not intractable  Comments:  Multifactorial: hormonal, med induced HA, neck pain. Sumatriptan PRN. Refer to neuro; consider trigger pt injections, botox. Consider imaging d/t new onset.  Orders:  -     sumatriptan (IMITREX) 50 MG tablet; Take 1 tablet (50 mg total) by mouth every 2 (two) hours as needed for Migraine (Take at first sign of headache). Not to exceed 200mg/24 hrs  -     Ambulatory referral/consult to Neurology; Future    Menorrhagia with regular  cycle  Comments:  Likely hormonal component of migraines. Advised to sched c OBGYN to discuss OCP options.    Cigarette smoker  Comments:  Encouraged tobacco cessation, especially in case she requires hormonal tx.    Chronic back pain, unspecified back location, unspecified back pain laterality  Comments:  Might have NSAID/med induced HA. Try to decrease NSAIDs. Advised to discuss tx options with her Ortho.    Neck pain  Comments:  Continue HEP, PT. F/u c Ortho. Consider trigger pt injections with Neuro.    Seasonal allergic rhinitis due to pollen  Comments:  Could also contribute to HA, but currently doing ok. May take antihistamine PRN.         Side effects of medication(s) were discussed in detail and patient voiced understanding.  Patient will call back for any issues or complications.     I have spent a total of 40 minutes with the patient as well as reviewing the chart/medical record and placing orders on the day of the visit. Discussed HA, menstrual cycles, tobacco use, neck/back pain, AR.    RTC PRN. Schedule annual c PCP.

## 2024-04-18 ENCOUNTER — PATIENT MESSAGE (OUTPATIENT)
Dept: ADMINISTRATIVE | Facility: HOSPITAL | Age: 54
End: 2024-04-18
Payer: COMMERCIAL

## 2024-05-20 ENCOUNTER — TELEPHONE (OUTPATIENT)
Dept: INTERNAL MEDICINE | Facility: CLINIC | Age: 54
End: 2024-05-20
Payer: COMMERCIAL

## 2024-05-20 NOTE — TELEPHONE ENCOUNTER
----- Message from Caitlin Denise sent at 5/20/2024  8:37 AM CDT -----  Regarding: Late pt  Patient is calling to notify they are running late due to:    Patient said they will be:  10 minutes     Please call if need to be rescheduled.

## 2024-06-11 ENCOUNTER — OFFICE VISIT (OUTPATIENT)
Dept: INTERNAL MEDICINE | Facility: CLINIC | Age: 54
End: 2024-06-11
Payer: COMMERCIAL

## 2024-06-11 VITALS
DIASTOLIC BLOOD PRESSURE: 80 MMHG | WEIGHT: 142.44 LBS | SYSTOLIC BLOOD PRESSURE: 118 MMHG | OXYGEN SATURATION: 99 % | HEART RATE: 90 BPM | BODY MASS INDEX: 20.43 KG/M2

## 2024-06-11 DIAGNOSIS — E53.8 VITAMIN B12 DEFICIENCY: ICD-10-CM

## 2024-06-11 DIAGNOSIS — N95.1 PERIMENOPAUSAL SYMPTOMS: ICD-10-CM

## 2024-06-11 DIAGNOSIS — Z00.00 HEALTH MAINTENANCE EXAMINATION: Primary | ICD-10-CM

## 2024-06-11 DIAGNOSIS — Z23 NEED FOR VACCINATION: ICD-10-CM

## 2024-06-11 DIAGNOSIS — Z13.1 SCREENING FOR DIABETES MELLITUS: ICD-10-CM

## 2024-06-11 DIAGNOSIS — Z12.31 SCREENING MAMMOGRAM FOR BREAST CANCER: ICD-10-CM

## 2024-06-11 DIAGNOSIS — Z13.6 SCREENING FOR CARDIOVASCULAR CONDITION: ICD-10-CM

## 2024-06-11 DIAGNOSIS — E55.9 VITAMIN D DEFICIENCY: ICD-10-CM

## 2024-06-11 DIAGNOSIS — G43.009 MIGRAINE WITHOUT AURA AND WITHOUT STATUS MIGRAINOSUS, NOT INTRACTABLE: ICD-10-CM

## 2024-06-11 DIAGNOSIS — F41.1 GENERALIZED ANXIETY DISORDER: ICD-10-CM

## 2024-06-11 PROCEDURE — 3074F SYST BP LT 130 MM HG: CPT | Mod: CPTII,S$GLB,, | Performed by: STUDENT IN AN ORGANIZED HEALTH CARE EDUCATION/TRAINING PROGRAM

## 2024-06-11 PROCEDURE — 3079F DIAST BP 80-89 MM HG: CPT | Mod: CPTII,S$GLB,, | Performed by: STUDENT IN AN ORGANIZED HEALTH CARE EDUCATION/TRAINING PROGRAM

## 2024-06-11 PROCEDURE — 3008F BODY MASS INDEX DOCD: CPT | Mod: CPTII,S$GLB,, | Performed by: STUDENT IN AN ORGANIZED HEALTH CARE EDUCATION/TRAINING PROGRAM

## 2024-06-11 PROCEDURE — 1159F MED LIST DOCD IN RCRD: CPT | Mod: CPTII,S$GLB,, | Performed by: STUDENT IN AN ORGANIZED HEALTH CARE EDUCATION/TRAINING PROGRAM

## 2024-06-11 PROCEDURE — 99999 PR PBB SHADOW E&M-EST. PATIENT-LVL IV: CPT | Mod: PBBFAC,,, | Performed by: STUDENT IN AN ORGANIZED HEALTH CARE EDUCATION/TRAINING PROGRAM

## 2024-06-11 PROCEDURE — 99396 PREV VISIT EST AGE 40-64: CPT | Mod: S$GLB,,, | Performed by: STUDENT IN AN ORGANIZED HEALTH CARE EDUCATION/TRAINING PROGRAM

## 2024-06-11 RX ORDER — RIZATRIPTAN BENZOATE 5 MG/1
TABLET, ORALLY DISINTEGRATING ORAL
Qty: 12 TABLET | Refills: 2 | Status: SHIPPED | OUTPATIENT
Start: 2024-06-11

## 2024-06-11 RX ORDER — SERTRALINE HYDROCHLORIDE 100 MG/1
100 TABLET, FILM COATED ORAL DAILY
Qty: 90 TABLET | Refills: 3 | Status: SHIPPED | OUTPATIENT
Start: 2024-06-11

## 2024-06-11 NOTE — PATIENT INSTRUCTIONS
[facility]   Phone: [phone]    Cognitive Behavioral Therapy (CBT) [facility]   Address: [facility] [place] [address]   Phone: [phone]   [email or web address]     Integrated Behavioral Health [address], [address]   Phone: [phone]   You can email for an appointment at: [email or web address]     [facility]   [address] [facility] [address], [facility], [address]   [email or web address]   Dr. Harleen Mejia, [phone] or [email or web address]   Dr. Ping Cortes, [phone] or [email or web address]     Deyanira Alfaro    LCSW (therapist) [phone]   [address] [place] [address]   Marlyn Gaytan   LCSW (therapist) [phone]   [address] [facility]   Ellen Omalley  LCSW (therapist) [phone]   [address] [place] [address]   DOMENICO Singleton          LCSW (therapist) [phone]   Valerio Gallegos [phone] (therapist) [facility]   Jose Gaytan (therapist) [phone]  [address] (therapist) [phone] [address] [facility]     Behavior Health Counseling [phone]   [address] [place] [address]     Online Therapist:     [email or web address]   [email or web address]     Free Guided Meditations   [email or web address]   [email or web address]   [email or web address]

## 2024-06-11 NOTE — PROGRESS NOTES
Subjective:       Patient ID: Liza Byrne is a 53 y.o. female.    Chief Complaint: Health maintenance examination [Z00.00]    Patient is established with me, here today for the following:     Health maintenance -   Colonoscopy performed . Told to repeat in 10 years.  Denies family history of colorectal cancer.  Mammogram in BI-RADS 1 in 2023. Due for repeat this month.  History of abnormal mammogram, required biopsy previously.  Denies family history of breast cancer.  Denies family history of ovarian cancer.  Last pap performed .   Denies history of abnormal pap smear.  Denies family history of osteoporosis.  Family history of cardiac disease.  UTD on Tdap, COVID primary, shingles 1/2 vaccinations.  Due for COVID, shingles 2nd dose vaccinations.  Started smoking at age 42, at most 1 PPD. Stopped smoking at age 47.  Denies current alcohol use.   Marijuana use.  Completed HIV and hepatitis C screening.  Due for lipid screening.  Lab Results       Component                Value               Date                       LDLCALC                  107.0               2023            The 10-year ASCVD risk score (Kavita SARKAR, et al., 2019) is: 0.9%  Due for diabetes screening.  Lab Results       Component                Value               Date                       HGBA1C                   5.2                 2023            Endorses overall healthy diet.   Eats mostly at home.  Plans to start exercise regimen.   Joined the [facility]  Endorses active lifestyle.     Has regular menstrual cycles monthly.  Menstruation lasts for 7-14 days.  Denies menorrhagia or requiring more than 5 pads or tampons in a single day.  Began menarche at age 15.   [id number]  Denies gestational diabetes and HTN.  Currently sexually active with .  Not currently requiring contraception.     Taking sumatriptan PRN for migraines with moderate effect  Works well if takes immediately at onset of headache  Does not abort  headache if takes after headache begins  Having 2-3 headaches per month, especially around menstrual cycles    Taking Zoloft for generalized with incomplete effect  Interested in establishing with with counselor    Has been in counseling previously  Feels more crying spells recently, overall feeling down  Has been taking current dose for about 6 years   Feels mood changes may be hormonally mediated and related to perimenopause      Vitamin D deficiency -  Currently taking vitamin D3 supplementation daily.   Lab Results       Component                Value               Date                       [id number]             26 (L)              05/19/2023               Vitamin B12 deficiency -  Currently taking vitamin B12 supplementation daily.            Review of Systems   Constitutional:  Negative for appetite change, chills, fatigue, fever and unexpected weight change.   Respiratory:  Negative for cough and shortness of breath.    Cardiovascular:  Negative for chest pain, palpitations and leg swelling.   Gastrointestinal:  Negative for abdominal pain, constipation, diarrhea, nausea and vomiting.   Skin:  Negative for rash.   Neurological:  Negative for dizziness, syncope, weakness and headaches.   Psychiatric/Behavioral:  Positive for dysphoric mood.          Current Outpatient Medications   Medication Instructions    b complex vitamins capsule 1 capsule, Oral, Daily    rizatriptan (MAXALT-MLT) 5 MG disintegrating tablet Take 1 tablet PO at onset of headache. May repeat dose once after 2 hours if headache still unrelieved. Maximum 2 tablets daily.    sertraline (ZOLOFT) 100 mg, Oral, Daily    valACYclovir (VALTREX) 500 mg, Oral, 2 times daily    vitamin D (VITAMIN D3) 1,000 Units, Oral, Daily     Objective:      Vitals:    06/11/24 1517   BP: 118/80   Pulse: 90   SpO2: 99%   Weight: 64.6 kg (142 lb 6.7 oz)   PainSc: 0-No pain     Body mass index is 20.43 kg/m².    Physical Exam  Vitals reviewed.   Constitutional:        General: She is not in acute distress.     Appearance: Normal appearance. She is not ill-appearing or diaphoretic.   HENT:      Head: Normocephalic and atraumatic.      Right Ear: Tympanic membrane, ear canal and external ear normal. There is no impacted cerumen.      Left Ear: Tympanic membrane, ear canal and external ear normal. There is no impacted cerumen.      Nose: Nose normal. No rhinorrhea.      Mouth/Throat:      Mouth: Mucous membranes are moist.      Pharynx: Oropharynx is clear. No oropharyngeal exudate or posterior oropharyngeal erythema.   Eyes:      General: No scleral icterus.        Right eye: No discharge.         Left eye: No discharge.      Conjunctiva/sclera: Conjunctivae normal.   Neck:      Thyroid: No thyromegaly or thyroid tenderness.      Trachea: Trachea normal.   Cardiovascular:      Rate and Rhythm: Normal rate and regular rhythm.      Heart sounds: Normal heart sounds. No murmur heard.     No friction rub. No gallop.   Pulmonary:      Effort: Pulmonary effort is normal. No respiratory distress.      Breath sounds: Normal breath sounds. No stridor. No wheezing, rhonchi or rales.   Abdominal:      General: There is no distension.      Palpations: Abdomen is soft.      Tenderness: There is no abdominal tenderness. There is no guarding or rebound.   Musculoskeletal:         General: No swelling or deformity.      Cervical back: Neck supple.   Lymphadenopathy:      Head:      Right side of head: No submandibular or posterior auricular adenopathy.      Left side of head: No submandibular or posterior auricular adenopathy.      Cervical: No cervical adenopathy.      Right cervical: No superficial, deep or posterior cervical adenopathy.     Left cervical: No superficial, deep or posterior cervical adenopathy.      Upper Body:      Right upper body: No supraclavicular adenopathy.      Left upper body: No supraclavicular adenopathy.   Skin:     General: Skin is warm and dry.   Neurological:       General: No focal deficit present.      Mental Status: She is alert. Mental status is at baseline.      Gait: Gait normal.   Psychiatric:         Mood and Affect: Mood normal.         Behavior: Behavior normal.         Assessment:       1. Health maintenance examination    2. Migraine without aura and without status migrainosus, not intractable    3. Generalized anxiety disorder    4. Perimenopausal symptoms    5. Vitamin D deficiency    6. Vitamin B12 deficiency    7. Need for vaccination    8. Screening for cardiovascular condition    9. Screening for diabetes mellitus    10. Screening mammogram for breast cancer        Plan:       Migraine without aura and without status migrainosus, not intractable  Discontinue sumatriptan  Start rizatriptan PRN  Follow up with neurology as scheduled   -     Comprehensive Metabolic Panel; Future  -     TSH; Future  -     CBC Auto Differential; Future  -     rizatriptan (MAXALT-MLT) 5 MG disintegrating tablet; Take 1 tablet PO at onset of headache. May repeat dose once after 2 hours if headache still unrelieved. Maximum 2 tablets daily.    Generalized anxiety disorder  Increase Zoloft to 100 mg daily  List of local mental health resources provided   RTC PRN  -     Comprehensive Metabolic Panel; Future  -     TSH; Future  -     CBC Auto Differential; Future  -     sertraline (ZOLOFT) 100 MG tablet; Take 1 tablet (100 mg total) by mouth once daily.    Perimenopausal symptoms  Referral for hormone consult   -     Ambulatory referral/consult to Gynecology; Future    Vitamin D deficiency  Continue supplementation.  -     Vitamin D; Future    Vitamin B12 deficiency  Continue supplementation.  -     Vitamin B12; Future    Health maintenance examination  Reviewed and discussed age appropriate screenings and immunizations.  RTC in 1 year for annual appointment, sooner PRN.  -     Comprehensive Metabolic Panel; Future  -     TSH; Future  -     Lipid Panel; Future  -     Hemoglobin  A1C; Future  -     CBC Auto Differential; Future  -     Vitamin D; Future  -     Vitamin B12; Future    Need for vaccination  Discussed recommended vaccinations and how to obtain.    Screening for cardiovascular condition  -     Lipid Panel; Future    Screening for diabetes mellitus  -     Hemoglobin A1C; Future    Screening mammogram for breast cancer  -     Mammo Digital Screening Bilat w/ Eulogio; Future      Yelitza Garvin MD  6/11/2024

## 2024-06-26 ENCOUNTER — OFFICE VISIT (OUTPATIENT)
Dept: NEUROLOGY | Facility: CLINIC | Age: 54
End: 2024-06-26
Payer: COMMERCIAL

## 2024-06-26 ENCOUNTER — LAB VISIT (OUTPATIENT)
Dept: LAB | Facility: OTHER | Age: 54
End: 2024-06-26
Attending: STUDENT IN AN ORGANIZED HEALTH CARE EDUCATION/TRAINING PROGRAM
Payer: COMMERCIAL

## 2024-06-26 VITALS
WEIGHT: 142.44 LBS | HEIGHT: 70 IN | BODY MASS INDEX: 20.39 KG/M2 | DIASTOLIC BLOOD PRESSURE: 77 MMHG | SYSTOLIC BLOOD PRESSURE: 109 MMHG | HEART RATE: 82 BPM

## 2024-06-26 DIAGNOSIS — Z00.00 HEALTH MAINTENANCE EXAMINATION: ICD-10-CM

## 2024-06-26 DIAGNOSIS — G43.009 MIGRAINE WITHOUT AURA AND WITHOUT STATUS MIGRAINOSUS, NOT INTRACTABLE: ICD-10-CM

## 2024-06-26 DIAGNOSIS — G43.119 INTRACTABLE MIGRAINE WITH AURA WITHOUT STATUS MIGRAINOSUS: ICD-10-CM

## 2024-06-26 DIAGNOSIS — F41.1 GENERALIZED ANXIETY DISORDER: ICD-10-CM

## 2024-06-26 DIAGNOSIS — E53.8 VITAMIN B12 DEFICIENCY: ICD-10-CM

## 2024-06-26 DIAGNOSIS — Z13.1 SCREENING FOR DIABETES MELLITUS: ICD-10-CM

## 2024-06-26 DIAGNOSIS — E55.9 VITAMIN D DEFICIENCY: ICD-10-CM

## 2024-06-26 DIAGNOSIS — Z13.6 SCREENING FOR CARDIOVASCULAR CONDITION: ICD-10-CM

## 2024-06-26 DIAGNOSIS — G43.839 INTRACTABLE MENSTRUAL MIGRAINE WITHOUT STATUS MIGRAINOSUS: Primary | ICD-10-CM

## 2024-06-26 LAB
25(OH)D3+25(OH)D2 SERPL-MCNC: 30 NG/ML (ref 30–96)
ALBUMIN SERPL BCP-MCNC: 4.2 G/DL (ref 3.5–5.2)
ALP SERPL-CCNC: 60 U/L (ref 55–135)
ALT SERPL W/O P-5'-P-CCNC: 15 U/L (ref 10–44)
ANION GAP SERPL CALC-SCNC: 7 MMOL/L (ref 8–16)
AST SERPL-CCNC: 13 U/L (ref 10–40)
BASOPHILS # BLD AUTO: 0.04 K/UL (ref 0–0.2)
BASOPHILS NFR BLD: 0.5 % (ref 0–1.9)
BILIRUB SERPL-MCNC: 0.2 MG/DL (ref 0.1–1)
BUN SERPL-MCNC: 9 MG/DL (ref 6–20)
CALCIUM SERPL-MCNC: 9.5 MG/DL (ref 8.7–10.5)
CHLORIDE SERPL-SCNC: 106 MMOL/L (ref 95–110)
CHOLEST SERPL-MCNC: 187 MG/DL (ref 120–199)
CHOLEST/HDLC SERPL: 4.3 {RATIO} (ref 2–5)
CO2 SERPL-SCNC: 28 MMOL/L (ref 23–29)
CREAT SERPL-MCNC: 0.8 MG/DL (ref 0.5–1.4)
DIFFERENTIAL METHOD BLD: ABNORMAL
EOSINOPHIL # BLD AUTO: 0.3 K/UL (ref 0–0.5)
EOSINOPHIL NFR BLD: 3.9 % (ref 0–8)
ERYTHROCYTE [DISTWIDTH] IN BLOOD BY AUTOMATED COUNT: 13.7 % (ref 11.5–14.5)
EST. GFR  (NO RACE VARIABLE): >60 ML/MIN/1.73 M^2
ESTIMATED AVG GLUCOSE: 103 MG/DL (ref 68–131)
GLUCOSE SERPL-MCNC: 53 MG/DL (ref 70–110)
HBA1C MFR BLD: 5.2 % (ref 4–5.6)
HCT VFR BLD AUTO: 41.3 % (ref 37–48.5)
HDLC SERPL-MCNC: 43 MG/DL (ref 40–75)
HDLC SERPL: 23 % (ref 20–50)
HGB BLD-MCNC: 13.3 G/DL (ref 12–16)
IMM GRANULOCYTES # BLD AUTO: 0.04 K/UL (ref 0–0.04)
IMM GRANULOCYTES NFR BLD AUTO: 0.5 % (ref 0–0.5)
LDLC SERPL CALC-MCNC: 129.2 MG/DL (ref 63–159)
LYMPHOCYTES # BLD AUTO: 1.3 K/UL (ref 1–4.8)
LYMPHOCYTES NFR BLD: 17.1 % (ref 18–48)
MCH RBC QN AUTO: 30.2 PG (ref 27–31)
MCHC RBC AUTO-ENTMCNC: 32.2 G/DL (ref 32–36)
MCV RBC AUTO: 94 FL (ref 82–98)
MONOCYTES # BLD AUTO: 0.7 K/UL (ref 0.3–1)
MONOCYTES NFR BLD: 8.9 % (ref 4–15)
NEUTROPHILS # BLD AUTO: 5.4 K/UL (ref 1.8–7.7)
NEUTROPHILS NFR BLD: 69.1 % (ref 38–73)
NONHDLC SERPL-MCNC: 144 MG/DL
NRBC BLD-RTO: 0 /100 WBC
PLATELET # BLD AUTO: 245 K/UL (ref 150–450)
PMV BLD AUTO: 9 FL (ref 9.2–12.9)
POTASSIUM SERPL-SCNC: 4.2 MMOL/L (ref 3.5–5.1)
PROT SERPL-MCNC: 7 G/DL (ref 6–8.4)
RBC # BLD AUTO: 4.4 M/UL (ref 4–5.4)
SODIUM SERPL-SCNC: 141 MMOL/L (ref 136–145)
TRIGL SERPL-MCNC: 74 MG/DL (ref 30–150)
TSH SERPL DL<=0.005 MIU/L-ACNC: 1.05 UIU/ML (ref 0.4–4)
VIT B12 SERPL-MCNC: 1227 PG/ML (ref 210–950)
WBC # BLD AUTO: 7.74 K/UL (ref 3.9–12.7)

## 2024-06-26 PROCEDURE — 1160F RVW MEDS BY RX/DR IN RCRD: CPT | Mod: CPTII,S$GLB,, | Performed by: STUDENT IN AN ORGANIZED HEALTH CARE EDUCATION/TRAINING PROGRAM

## 2024-06-26 PROCEDURE — 36415 COLL VENOUS BLD VENIPUNCTURE: CPT | Performed by: STUDENT IN AN ORGANIZED HEALTH CARE EDUCATION/TRAINING PROGRAM

## 2024-06-26 PROCEDURE — 80061 LIPID PANEL: CPT | Performed by: STUDENT IN AN ORGANIZED HEALTH CARE EDUCATION/TRAINING PROGRAM

## 2024-06-26 PROCEDURE — 3074F SYST BP LT 130 MM HG: CPT | Mod: CPTII,S$GLB,, | Performed by: STUDENT IN AN ORGANIZED HEALTH CARE EDUCATION/TRAINING PROGRAM

## 2024-06-26 PROCEDURE — 80053 COMPREHEN METABOLIC PANEL: CPT | Performed by: STUDENT IN AN ORGANIZED HEALTH CARE EDUCATION/TRAINING PROGRAM

## 2024-06-26 PROCEDURE — 84443 ASSAY THYROID STIM HORMONE: CPT | Performed by: STUDENT IN AN ORGANIZED HEALTH CARE EDUCATION/TRAINING PROGRAM

## 2024-06-26 PROCEDURE — 1159F MED LIST DOCD IN RCRD: CPT | Mod: CPTII,S$GLB,, | Performed by: STUDENT IN AN ORGANIZED HEALTH CARE EDUCATION/TRAINING PROGRAM

## 2024-06-26 PROCEDURE — 85025 COMPLETE CBC W/AUTO DIFF WBC: CPT | Performed by: STUDENT IN AN ORGANIZED HEALTH CARE EDUCATION/TRAINING PROGRAM

## 2024-06-26 PROCEDURE — 3008F BODY MASS INDEX DOCD: CPT | Mod: CPTII,S$GLB,, | Performed by: STUDENT IN AN ORGANIZED HEALTH CARE EDUCATION/TRAINING PROGRAM

## 2024-06-26 PROCEDURE — 3078F DIAST BP <80 MM HG: CPT | Mod: CPTII,S$GLB,, | Performed by: STUDENT IN AN ORGANIZED HEALTH CARE EDUCATION/TRAINING PROGRAM

## 2024-06-26 PROCEDURE — 99204 OFFICE O/P NEW MOD 45 MIN: CPT | Mod: S$GLB,,, | Performed by: STUDENT IN AN ORGANIZED HEALTH CARE EDUCATION/TRAINING PROGRAM

## 2024-06-26 PROCEDURE — 82607 VITAMIN B-12: CPT | Performed by: STUDENT IN AN ORGANIZED HEALTH CARE EDUCATION/TRAINING PROGRAM

## 2024-06-26 PROCEDURE — 99999 PR PBB SHADOW E&M-EST. PATIENT-LVL III: CPT | Mod: PBBFAC,,, | Performed by: STUDENT IN AN ORGANIZED HEALTH CARE EDUCATION/TRAINING PROGRAM

## 2024-06-26 PROCEDURE — 83036 HEMOGLOBIN GLYCOSYLATED A1C: CPT | Performed by: STUDENT IN AN ORGANIZED HEALTH CARE EDUCATION/TRAINING PROGRAM

## 2024-06-26 PROCEDURE — 82306 VITAMIN D 25 HYDROXY: CPT | Performed by: STUDENT IN AN ORGANIZED HEALTH CARE EDUCATION/TRAINING PROGRAM

## 2024-06-26 RX ORDER — NARATRIPTAN 2.5 MG/1
TABLET ORAL
Qty: 8 TABLET | Refills: 4 | Status: SHIPPED | OUTPATIENT
Start: 2024-06-26 | End: 2024-07-26

## 2024-06-26 NOTE — PROGRESS NOTES
Patient ID: [id number]  Referring Physician: Marlyn Juarez MD    Chief Complaint/Reason for Consult: Headaches  Subjective:     HPI:  Liza Byrne is a very pleasant 53 y.o. RH female with depression, insomnia, and migraines. she is presenting today as a new patient for evaluation of migraine headaches.     Headache history  Age at onset: 48 y/o when her daughter was born  Course over time: stable  Location: occipital radiating to frontal and top of the head  Character:  starts as tension progresses to throbbing  Intensity: 10 on a scale from 1 to 10  Frequency:  x2 per month right before cycles .   Duration: 1 day  Timing: do not seem to be related to any time of the day   Mild/moderate/severe. Work attendance or other daily activities are affected by the headaches.  Aura: preceded by an aura consisting of visual distortion (increased contrast, more prominent edges)  Associated symptoms: N/V, Photosensitivity, and Phonophobia   Associated neurologic symptoms: decreased physical activity and speech difficulties  Precipitating factors: Menstrual periods (right before)  Aggravating factors: Physical activity   Home treatment: Imitrex with some improvement. Maxalt was just prescribed and hasn't tried it yet.  ER visits: No  Positive Hx of: No Head trauma  Is medication overuse contributing to the patient's current migraine burden: No    She has a brain imaging from a couple of years ago that was completed outside [facility].     Headache Medication history:  AED Neuromodulators  MAOIs  Ergot Alkaloids    Acetazolamide (Diamox) [] Phenelzine (Nardil) [] Dihydroergotamine (Migranal) []   Carbamazepine (Tegretol) [] Tranylcypromine (Parnate) [] Ergotamine (Ergomar) []   Gabapentin (Neurontin) [] Antihistamine/Serotonergic  Triptans    Lacosamide (Vimpat) [] Cyproheptadine (Periactin) [] Almotriptan (Axert) []   Lamotrigine (Lamictal) [] Antihypertensives  Eletriptan (Relpax) []   Levatiracetam (Keppra) []  Atenolol (Tenormin) [] Frovatriptan (Frova) []   Oxcarbazepine (Trileptal) [] Bisoprostol (Zebeta) [] Naratriptan (Amerge) []   Phenobarbital [] Candesartan (Atacand) [] Rizatriptan (Maxalt) []     Nebivolol (Bystolic)  Sumatriptan (Imitrex) [x]   Levetiracetam (Keppra)  Carvedilol (Coreg) [] Zolmitriptan (Zomig) []   Phenytoin (Dilantin) [] Diltiazem (Cardizem) []     Pregabalin (Lyrica) [] Lisinopril (Prinivil, Zestril) [] Combo Abortives    Primidone (Mysoline) [] Metoprolol (Toprol) [] BC Powder []   Tiagabine (Gabatril) [] Nadolol (Corgard) [] Butalbital and Acetaminophen (Bupap) []   Topiramate (Topamax)  (Trokendi) [] Nicardipine (Cardene) []     Vigabatrin (Sabril) [] Nimodipine (Nimotop) [] Butalbital, Acetaminophen, and caffeine (Fioricet) []   Valproic Acid (Depakote) (Divalproex Sodium) [] Propranolol (Inderal) []     Zonisamide (Zonegran) [] Telmisartan (Micardis) [] Butalbital, Aspirin, and caffeine (Fiorinal) []   Benzodiazepines  Timolol (Blocadren) []     Alprazolam (Xanax) [] Verapamil (Calan, Verelan) [] Butalbital, Caffeine, Acetaminophen, and Codeine (Fioricet with Codeine) []   Diazepam (Valium) [] NSAIDs      Lorazepam (Ativan) [] Acetaminophen (Tylenol) []     Clonazepam (Klonopin) [] Acetylsalicylic Acid (Aspirin) [] Butalbital, Caffeine, Aspirin, and Codeine  (Fiorinal with Codeine) []   Antidepressants  Diclofenac (Cambia) []     Amitriptyline (Elavil) [] Ibuprofen (Motrin) []     Desipramine (Norpramin) [] Indomethacin (Indocin) [] Aspirin, Caffeine, and Acetaminophen (Excedrin) (Goodys) []   Doxepin (Sinequan) [] Ketoprofen (Orudis) []     Fluoxetine (Prozac) [] Ketorolac (Toradol) [] Acetaminophen, Dichloralphenazone, and Isometheptene (Midrin) []   Imipramine (Tofranil) [] Naproxen (Anaprox) (Aleve) []     Nortriptyline (Pamelor) [] Meclofenamic Acid (Meclomen) []     Venlafaxine (Effexor) [] Meloxicam (Mobic) [] Procedures    Desvenlafazine (Pristiq) [] Monoclonals  Greater occipital  nerve block []   Duloxetine (Cymbalta) [] Eptinezumab [] Cervical, Thoracic, Lumbar radiofrequency ablation []   Trazadone [] Erenumab-aooe (Aimovig) [] Spenopalatine ganglion block []   Sertraline (Zoloft) [x] Galcanezumab (Emgality) [] Occipital neuro stimulation []   Protriptyline (Vivactil) [] Fremanazumab-vfrm (Ajovy)  Cervical, Thoracic, Lumbar, Caudal Epidural steroid injection []   Escitalopram (Lexapro) [] Other [] Sacroiliac joint steroid injection []   Celexa [] Memantine (Namenda) [] Transforaminal epidural steroid injection []   Oral CGRP inhibitors  Botox [] Facet joint injections []   Atogepant (Qulipta) [] Baclofen (Lioresal) [] Cervical, Thoracic, Lumbar medial branch blocks []   Rimegepant (Nurtec) [] Methocarbamol (Robaxin) [] Cefaly []   Ubrogepant (Ubrelvy) [] Cyclobenzaprine (Flexeril) [] Gamma Core []    [] Tizanidine (Zanaflex) [] Iovera []    []   Transcranial Magnetic stimulation []     Review of Systems:  Review of Systems   Eyes:  Positive for photophobia. Negative for blurred vision and double vision.   Gastrointestinal:  Positive for nausea. Negative for vomiting.   Musculoskeletal:  Negative for falls.   Neurological:  Positive for headaches. Negative for dizziness, tingling, sensory change and weakness.   Psychiatric/Behavioral:  Positive for depression (controlled with medication). The patient has insomnia.    All other systems reviewed and are negative.     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     Generalized anxiety disorder 05/26/2023    Chronic right shoulder pain 06/01/2023    Neck pain on right side 06/01/2023    Impacted cerumen of left ear 06/17/2023    Seasonal allergic rhinitis due to pollen 03/01/2024     Resolved Ambulatory Problems     Diagnosis Date Noted    No  Resolved Ambulatory Problems     No Additional Past Medical History       Allergies:  Review of patient's allergies indicates:  No Known Allergies    Pertinent Family History:  Family History   Problem Relation Name Age of Onset    Drug abuse Mother      COPD Mother      Arthritis Mother      Alcohol abuse Mother      Drug abuse Father      Alcohol abuse Father      Heart attack Father          in his 60's    Coronary artery disease Father      Arthritis Sister half     Heart failure Maternal Grandmother      Diabetes Maternal Grandmother      Diverticulitis Maternal Grandmother      Kidney failure Maternal Grandfather      Diabetes Maternal Grandfather      Heart attack Maternal Grandfather      Colon cancer Neg Hx      Breast cancer Neg Hx      Ovarian cancer Neg Hx      Stroke Neg Hx      Osteoporosis Neg Hx         Pertinent Social History:  Social History     Tobacco Use    Smoking status: Former     Current packs/day: 0.00     Average packs/day: 1 pack/day for 5.0 years (5.0 ttl pk-yrs)     Types: Cigarettes     Start date: 2012     Quit date: 2017     Years since quittin.4     Passive exposure: Never    Smokeless tobacco: Never   Substance Use Topics    Alcohol use: Not Currently    Drug use: Never       Medications:  Current Outpatient Medications   Medication Instructions    b complex vitamins capsule 1 capsule, Oral, Daily    rizatriptan (MAXALT-MLT) 5 MG disintegrating tablet Take 1 tablet PO at onset of headache. May repeat dose once after 2 hours if headache still unrelieved. Maximum 2 tablets daily.    sertraline (ZOLOFT) 100 mg, Oral, Daily    valACYclovir (VALTREX) 500 mg, Oral, 2 times daily    vitamin D (VITAMIN D3) 1,000 Units, Oral, Daily        Objective:     Vitals:    24 0816   BP: 109/77   Pulse: 82      General:  Well-appearing, well-nourished, NAD, cooperative  MSK: no TTP on occipital, cervical paraspinal muscles or traps    Neurologic Exam:   Awake, alert and oriented  x3  Speech spontaneous and fluent, intact comprehension.   Adequate fund of knowledge, vocabulary.    CN II - CN XII:  PERRLA. EOM intact. No Nystagmus. No ophthalmoplegia.   Facial sensation is normal to light touch.   Facial expression is full and symmetric.   Hearing is intact bilaterally.   Palate elevates symmetrically.   SCM and Trapezius full strength bilaterally.   Tongue is midline.     Motor:  Normal bulk and tone in all four limbs.   There are no atrophy or fasciculations. No tremor.     Shoulder  Abd Shoulder Add Elbow   Flex Elbow  Ext Wrist   Flex Wrist  Ext Finger  Flex Finger  Ext Finger  Abd Finger   Add IO Opposition   Right 5 5 5 5       5    Left 5 5 5 5       5       Hip  Flex Hip  Ext Thigh   Abd Thigh  Add Knee  Flex Knee  Ext Plantar  Flex Dorsiflex   Right 5 5   5 5 5 5   Left 5 5   5 5 5 5     Sensory:  Light touch: normal tactile sense throughout  Proprioception: proprioceptive sense present on RUE and on LUE  Romberg is Exam: negative    DTRs:   Biceps Brachioradialis Triceps Natalie Patellar Ankle Plantar   Right 2+ 2+  - 2+ 2+    Left 2+ 2+  - 2+ 2+      Coordination:  Finger to nose is normal bilaterally.  Normal fine finger movements and rapid alternating movements.    Gait:  Normal casual and tandem gait.    Pertinent lab results  Lab Results   Component Value Date    [id number] 26 (L) 05/19/2023     Lab Results   Component Value Date    SEDRATE 10 04/05/2022     Lab Results   Component Value Date    [id number] Non-reactive 05/19/2023     Lab Results   Component Value Date    TSH 0.704 05/19/2023    WBC 8.07 05/19/2023    LYMPH 1.5 05/19/2023    LYMPH 18.6 05/19/2023    RBC 4.21 05/19/2023    HGB 12.5 05/19/2023    HCT 38.6 05/19/2023    MCV 92 05/19/2023     05/19/2023     05/19/2023    K 3.7 05/19/2023    CO2 27 05/19/2023    BUN 10 05/19/2023    CREATININE 0.8 05/19/2023    CALCIUM 9.7 05/19/2023    AST 16 05/19/2023    ALT 13 05/19/2023       Pertinent imaging  results  *No relevant imaging available to review     Other pertinent studies  None    Assessment:   Liza Byrne is a 53 y.o. RH female with depression and insomnia who presents for evaluation of headaches. Characteristics are most consistent with  catamenial migraines with aura. she is having <6 headache days per month therefore a daily preventative treatment is not warranted, she would not qualify for Botox injections either. If at all needed, she may take naratriptan 1 mg BID x2-3 prior to cycles as prevention. For rescue, I'd recommend naratriptan 2.5 mg instead of rizatriptan for better control of menstrual headaches. Thorough neurological exam is reassuring today but due to the recent onset of migraines at the age of 47 I'd recommend imaging of the brain, it seems that she has completed one within the last couple of years that she will drop off later for review. Contraceptive methods to stop the cycles could be beneficial for her migraines.      1. Intractable menstrual migraine without status migrainosus    2. Migraine without aura and without status migrainosus, not intractable      Plan:     - naratriptan (AMERGE) 2.5 MG tablet; 2.5 mg at onset of headache, may repeat in 4 hours if needed. Maximum: 5 mg per 24 hours  Dispense: 8 tablet; Refill: 4  - she will drop off her MRI disc for review   - Follow up: as needed      Disclaimer: This note was partly generated using dictation software which may occasionally result in transcription errors that are missed on review.      Based on our encounter today, my overall Medical Decision Making is a Level 4     Complexity of Problem: Moderate (2 or more stable chronic illnesses)  Complexity of Data: Moderate (Review of >3 unique test results)  Risk of Complications and/or morbidity/mortality of Management: Moderate risk (Prescription drug management)        Zoila Vuong MD    [facility]  06/26/2024

## 2024-07-08 ENCOUNTER — TELEPHONE (OUTPATIENT)
Dept: INTERNAL MEDICINE | Facility: CLINIC | Age: 54
End: 2024-07-08
Payer: COMMERCIAL

## 2024-07-08 NOTE — TELEPHONE ENCOUNTER
----- Message from Timur Arenas sent at 7/8/2024  9:25 AM CDT -----  Contact: Tirso  Type:  Patient Call          Who Called: patient         Does the patient know what this is regarding?: Requesting a call back to discuss her labs ;pt said that she was unable to respond to the portal message ; please advise           Would the patient rather a call back or a response via [facility]?call           Best Call Back Number: [phone]             Additional Information:

## 2024-07-08 NOTE — TELEPHONE ENCOUNTER
----- Message from Timur Arenas sent at 7/8/2024  9:25 AM CDT -----  Contact: Tirso  Type:  Patient Call          Who Called: patient         Does the patient know what this is regarding?: Requesting a call back to discuss her labs ;pt said that she was unable to respond to the portal message ; please advise           Would the patient rather a call back or a response via [facility]?call           Best Call Back Number: [phone]             Additional Information:

## 2024-07-10 ENCOUNTER — PATIENT MESSAGE (OUTPATIENT)
Dept: INTERNAL MEDICINE | Facility: CLINIC | Age: 54
End: 2024-07-10
Payer: COMMERCIAL

## 2024-07-12 ENCOUNTER — TELEPHONE (OUTPATIENT)
Dept: INTERNAL MEDICINE | Facility: CLINIC | Age: 54
End: 2024-07-12
Payer: COMMERCIAL

## 2024-07-19 DIAGNOSIS — M25.511 RIGHT SHOULDER PAIN, UNSPECIFIED CHRONICITY: Primary | ICD-10-CM

## 2024-07-30 ENCOUNTER — HOSPITAL ENCOUNTER (OUTPATIENT)
Dept: RADIOLOGY | Facility: OTHER | Age: 54
Discharge: HOME | End: 2024-07-30
Attending: STUDENT IN AN ORGANIZED HEALTH CARE EDUCATION/TRAINING PROGRAM
Payer: COMMERCIAL

## 2024-07-30 DIAGNOSIS — Z12.31 SCREENING MAMMOGRAM FOR BREAST CANCER: ICD-10-CM

## 2024-07-30 PROCEDURE — 77063 BREAST TOMOSYNTHESIS BI: CPT | Mod: TC

## 2024-07-30 PROCEDURE — 77067 SCR MAMMO BI INCL CAD: CPT | Mod: TC

## 2024-08-07 ENCOUNTER — OFFICE VISIT (OUTPATIENT)
Dept: ORTHOPEDICS | Facility: CLINIC | Age: 54
End: 2024-08-07
Payer: COMMERCIAL

## 2024-08-07 ENCOUNTER — HOSPITAL ENCOUNTER (OUTPATIENT)
Dept: RADIOLOGY | Facility: HOSPITAL | Age: 54
Discharge: HOME | End: 2024-08-07
Attending: ORTHOPAEDIC SURGERY
Payer: COMMERCIAL

## 2024-08-07 VITALS — HEIGHT: 70 IN | BODY MASS INDEX: 19.92 KG/M2 | WEIGHT: 139.13 LBS

## 2024-08-07 DIAGNOSIS — M75.41 SUBACROMIAL IMPINGEMENT, RIGHT: Primary | ICD-10-CM

## 2024-08-07 DIAGNOSIS — M25.511 RIGHT SHOULDER PAIN, UNSPECIFIED CHRONICITY: ICD-10-CM

## 2024-08-07 PROCEDURE — 1159F MED LIST DOCD IN RCRD: CPT | Mod: CPTII,S$GLB,, | Performed by: ORTHOPAEDIC SURGERY

## 2024-08-07 PROCEDURE — 3044F HG A1C LEVEL LT 7.0%: CPT | Mod: CPTII,S$GLB,, | Performed by: ORTHOPAEDIC SURGERY

## 2024-08-07 PROCEDURE — 20610 DRAIN/INJ JOINT/BURSA W/O US: CPT | Mod: RT,S$GLB,, | Performed by: ORTHOPAEDIC SURGERY

## 2024-08-07 PROCEDURE — 99214 OFFICE O/P EST MOD 30 MIN: CPT | Mod: 25,S$GLB,, | Performed by: ORTHOPAEDIC SURGERY

## 2024-08-07 PROCEDURE — 73030 X-RAY EXAM OF SHOULDER: CPT | Mod: 26,RT,, | Performed by: RADIOLOGY

## 2024-08-07 PROCEDURE — 73030 X-RAY EXAM OF SHOULDER: CPT | Mod: TC,PN,RT

## 2024-08-07 PROCEDURE — 3008F BODY MASS INDEX DOCD: CPT | Mod: CPTII,S$GLB,, | Performed by: ORTHOPAEDIC SURGERY

## 2024-08-07 PROCEDURE — 99999 PR PBB SHADOW E&M-EST. PATIENT-LVL III: CPT | Mod: PBBFAC,,, | Performed by: ORTHOPAEDIC SURGERY

## 2024-08-07 PROCEDURE — 1160F RVW MEDS BY RX/DR IN RCRD: CPT | Mod: CPTII,S$GLB,, | Performed by: ORTHOPAEDIC SURGERY

## 2024-08-07 RX ADMIN — TRIAMCINOLONE ACETONIDE 40 MG: 40 INJECTION, SUSPENSION INTRA-ARTICULAR; INTRAMUSCULAR at 10:08

## 2024-08-08 PROBLEM — M75.41 SUBACROMIAL IMPINGEMENT, RIGHT: Status: ACTIVE | Noted: 2024-08-08

## 2024-08-08 RX ORDER — TRIAMCINOLONE ACETONIDE 40 MG/ML
40 INJECTION, SUSPENSION INTRA-ARTICULAR; INTRAMUSCULAR
Status: DISCONTINUED | OUTPATIENT
Start: 2024-08-07 | End: 2024-08-08 | Stop reason: HOSPADM

## 2024-09-13 ENCOUNTER — PATIENT MESSAGE (OUTPATIENT)
Dept: OBSTETRICS AND GYNECOLOGY | Facility: CLINIC | Age: 54
End: 2024-09-13
Payer: COMMERCIAL

## 2024-09-18 ENCOUNTER — OFFICE VISIT (OUTPATIENT)
Dept: ORTHOPEDICS | Facility: CLINIC | Age: 54
End: 2024-09-18
Payer: COMMERCIAL

## 2024-09-18 VITALS — HEIGHT: 70 IN | WEIGHT: 143.06 LBS | BODY MASS INDEX: 20.48 KG/M2

## 2024-09-18 DIAGNOSIS — M75.41 SUBACROMIAL IMPINGEMENT, RIGHT: Primary | ICD-10-CM

## 2024-09-18 PROCEDURE — 1159F MED LIST DOCD IN RCRD: CPT | Mod: CPTII,S$GLB,, | Performed by: ORTHOPAEDIC SURGERY

## 2024-09-18 PROCEDURE — 99213 OFFICE O/P EST LOW 20 MIN: CPT | Mod: S$GLB,,, | Performed by: ORTHOPAEDIC SURGERY

## 2024-09-18 PROCEDURE — 99999 PR PBB SHADOW E&M-EST. PATIENT-LVL III: CPT | Mod: PBBFAC,,, | Performed by: ORTHOPAEDIC SURGERY

## 2024-09-18 PROCEDURE — 1160F RVW MEDS BY RX/DR IN RCRD: CPT | Mod: CPTII,S$GLB,, | Performed by: ORTHOPAEDIC SURGERY

## 2024-09-18 PROCEDURE — 3044F HG A1C LEVEL LT 7.0%: CPT | Mod: CPTII,S$GLB,, | Performed by: ORTHOPAEDIC SURGERY

## 2024-09-18 PROCEDURE — 3008F BODY MASS INDEX DOCD: CPT | Mod: CPTII,S$GLB,, | Performed by: ORTHOPAEDIC SURGERY

## 2024-09-18 NOTE — PROGRESS NOTES
Patient ID:   Liza Byrne is a 53 y.o. female.    Chief Complaint:   Follow-up evaluation for right shoulder impingement    HPI:   Mrs. Byrne is returning for evaluation of the right shoulder. She was seen about six weeks ago. She was given a CSI and prescribed PT/OT. She reports getting nearly complete relief with the injection. He decided not to do PT/OT. Pain intensity currently 0/10.     Medications:    Current Outpatient Medications:     b complex vitamins capsule, Take 1 capsule by mouth once daily., Disp: , Rfl:     sertraline (ZOLOFT) 100 MG tablet, Take 1 tablet (100 mg total) by mouth once daily., Disp: 90 tablet, Rfl: 3    valACYclovir (VALTREX) 500 MG tablet, Take 500 mg by mouth 2 (two) times daily., Disp: , Rfl:     vitamin D (VITAMIN D3) 1000 units Tab, Take 1,000 Units by mouth once daily., Disp: , Rfl:     naratriptan (AMERGE) 2.5 MG tablet, 2.5 mg at onset of headache, may repeat in 4 hours if needed. Maximum: 5 mg per 24 hours, Disp: 8 tablet, Rfl: 4    Allergies:  Review of patient's allergies indicates:  No Known Allergies    Past Medical History:  History reviewed. No pertinent past medical history.     Past Surgical History:  Past Surgical History:   Procedure Laterality Date    AUGMENTATION OF BREAST  2004    BREAST SURGERY       SECTION  10/17/2017    CHONDROPLASTY OF KNEE  09/15/2022    Procedure: CHONDROPLASTY, KNEE;  Surgeon: Josiah Butler MD;  Location: [facility] OR;  Service: Orthopedics;;    COSMETIC SURGERY      EXCISION OF MEDIAL MENISCUS OF KNEE  09/15/2022    Procedure: MENISCECTOMY, KNEE, MEDIAL;  Surgeon: Josiah Butler MD;  Location: [facility] OR;  Service: Orthopedics;;    KNEE ARTHROSCOPY W/ ACL RECONSTRUCTION Right 09/15/2022    Procedure: RECONSTRUCTION, KNEE, ACL, ARTHROSCOPIC;  Surgeon: Josiah Butler MD;  Location: [facility] OR;  Service: Orthopedics;  Laterality: Right;  [facility] ACL and meniscal repair instruments, BTB allograft in freezer,  "interference screws, large frag set with washers  Linvate confirmed Cw     KNEE ARTHROSCOPY W/ MENISCECTOMY Right 2022    Procedure: Right knee arthroscopy, partial meniscectomy, bone grafting of old ACL tunnels;  Surgeon: Josiah Butler MD;  Location: [facility];  Service: Orthopedics;  Laterality: Right;  Linvatec ACL instrumentation, Cloward bone graft dowels    MAMMO BREAST STEREOTACTIC BREAST BIOPSY RIGHT         Social History:  Social History     Occupational History    Not on file   Tobacco Use    Smoking status: Former     Current packs/day: 0.00     Average packs/day: 1 pack/day for 5.0 years (5.0 ttl pk-yrs)     Types: Cigarettes     Start date: 2012     Quit date: 2017     Years since quittin.7     Passive exposure: Never    Smokeless tobacco: Never   Substance and Sexual Activity    Alcohol use: Not Currently    Drug use: Never    Sexual activity: Yes     Partners: Male     Birth control/protection: None       Family History:  Family History   Problem Relation Name Age of Onset    Drug abuse Mother      COPD Mother      Arthritis Mother      Alcohol abuse Mother      Drug abuse Father      Alcohol abuse Father      Heart attack Father          in his 60's    Coronary artery disease Father      Arthritis Sister half     Heart failure Maternal Grandmother      Diabetes Maternal Grandmother      Diverticulitis Maternal Grandmother      Kidney failure Maternal Grandfather      Diabetes Maternal Grandfather      Heart attack Maternal Grandfather      Colon cancer Neg Hx      Breast cancer Neg Hx      Ovarian cancer Neg Hx      Stroke Neg Hx      Osteoporosis Neg Hx          ROS:  Review of Systems   Musculoskeletal:  Negative for joint pain and myalgias.   All other systems reviewed and are negative.      Vitals:  Ht 5' 10" (1.778 m)   Wt 64.9 kg (143 lb 1.3 oz)   BMI 20.53 kg/m²     Physical Examination:  Comprehensive Orthopaedic Musculoskeletal Exam    General      " Constitutional: appears stated age, well-developed and well-nourished    Scleral icterus: no    Labored breathing: no    Psychiatric: normal mood and affect and no acute distress    Neurological: alert and oriented x3    Skin: intact    Lymphadenopathy: none     Ortho Exam   Right shoulder exam:  No deformity  ROM: full  RTC strength 5/5 in elevation, ER, IR  Impingement maneuvers negative.     Assessment:  1. Subacromial impingement, right      Plan:  Patient is doing well. We will observe and have her return if the pain returns.      No follow-ups on file.

## 2024-09-24 ENCOUNTER — LAB VISIT (OUTPATIENT)
Dept: LAB | Facility: OTHER | Age: 54
End: 2024-09-24
Attending: PHYSICIAN ASSISTANT
Payer: COMMERCIAL

## 2024-09-24 ENCOUNTER — OFFICE VISIT (OUTPATIENT)
Dept: OBSTETRICS AND GYNECOLOGY | Facility: CLINIC | Age: 54
End: 2024-09-24
Payer: COMMERCIAL

## 2024-09-24 VITALS
BODY MASS INDEX: 20.5 KG/M2 | HEART RATE: 75 BPM | SYSTOLIC BLOOD PRESSURE: 114 MMHG | HEIGHT: 70 IN | DIASTOLIC BLOOD PRESSURE: 76 MMHG | WEIGHT: 143.19 LBS

## 2024-09-24 DIAGNOSIS — N95.1 PERIMENOPAUSAL SYMPTOMS: ICD-10-CM

## 2024-09-24 DIAGNOSIS — N95.1 PERIMENOPAUSAL SYMPTOMS: Primary | ICD-10-CM

## 2024-09-24 LAB
ESTRADIOL SERPL-MCNC: 93 PG/ML
FSH SERPL-ACNC: 13.79 MIU/ML
PROGEST SERPL-MCNC: 0.3 NG/ML
TESTOST SERPL-MCNC: 22 NG/DL (ref 5–73)

## 2024-09-24 PROCEDURE — 83001 ASSAY OF GONADOTROPIN (FSH): CPT | Performed by: PHYSICIAN ASSISTANT

## 2024-09-24 PROCEDURE — 36415 COLL VENOUS BLD VENIPUNCTURE: CPT | Performed by: PHYSICIAN ASSISTANT

## 2024-09-24 PROCEDURE — 3074F SYST BP LT 130 MM HG: CPT | Mod: CPTII,S$GLB,, | Performed by: PHYSICIAN ASSISTANT

## 2024-09-24 PROCEDURE — 3078F DIAST BP <80 MM HG: CPT | Mod: CPTII,S$GLB,, | Performed by: PHYSICIAN ASSISTANT

## 2024-09-24 PROCEDURE — 99999 PR PBB SHADOW E&M-EST. PATIENT-LVL IV: CPT | Mod: PBBFAC,,, | Performed by: PHYSICIAN ASSISTANT

## 2024-09-24 PROCEDURE — 3044F HG A1C LEVEL LT 7.0%: CPT | Mod: CPTII,S$GLB,, | Performed by: PHYSICIAN ASSISTANT

## 2024-09-24 PROCEDURE — 3008F BODY MASS INDEX DOCD: CPT | Mod: CPTII,S$GLB,, | Performed by: PHYSICIAN ASSISTANT

## 2024-09-24 PROCEDURE — 1160F RVW MEDS BY RX/DR IN RCRD: CPT | Mod: CPTII,S$GLB,, | Performed by: PHYSICIAN ASSISTANT

## 2024-09-24 PROCEDURE — 1159F MED LIST DOCD IN RCRD: CPT | Mod: CPTII,S$GLB,, | Performed by: PHYSICIAN ASSISTANT

## 2024-09-24 PROCEDURE — 84403 ASSAY OF TOTAL TESTOSTERONE: CPT | Performed by: PHYSICIAN ASSISTANT

## 2024-09-24 PROCEDURE — 84402 ASSAY OF FREE TESTOSTERONE: CPT | Performed by: PHYSICIAN ASSISTANT

## 2024-09-24 PROCEDURE — 84144 ASSAY OF PROGESTERONE: CPT | Performed by: PHYSICIAN ASSISTANT

## 2024-09-24 PROCEDURE — 82670 ASSAY OF TOTAL ESTRADIOL: CPT | Performed by: PHYSICIAN ASSISTANT

## 2024-09-24 PROCEDURE — 99204 OFFICE O/P NEW MOD 45 MIN: CPT | Mod: S$GLB,,, | Performed by: PHYSICIAN ASSISTANT

## 2024-09-24 RX ORDER — PROGESTERONE 100 MG/1
100 CAPSULE ORAL NIGHTLY
Qty: 30 CAPSULE | Refills: 11 | Status: SHIPPED | OUTPATIENT
Start: 2024-09-24 | End: 2025-09-24

## 2024-09-24 NOTE — PROGRESS NOTES
Subjective:      Liza Byrne is a 53 y.o. female who presents for HRT consult. Menarche at age 15. She is  with first live birth at age 47. Severe postpartum depression with suicidal thoughts after her pregnancy. Stable with starting zoloft 100mg QD. Periods are monthly but have changed in the last few years. Periods were always light and lasting 4-5 days. Now her periods are lasting 7 days with heavier bleeding on days 1-2 and 5-6. In the last few years she has noticed increased irritability, fatigue, brain fog, bilateral swelling around her ankles, low libido and headaches. She is not on contraception. Sexually active with one partner, her . She continues to take Zoloft 100mg QD.   She is not currently exercising due to increased fatigue. Admits to craving sugar and eating lots of mangos. Low glucose on recent labs that were non-fasting. Aversion recently to meat.    PCP: Yelitza Garvin MD    Routine labs: 2024  [place] [facility] [place]: 2022  Mammogram: 2024 TC  8.65%   Colonoscopy: 2022 repeat in 10 years    Lab Visit on 2024   Component Date Value Ref Range Status    Sodium 2024 141  136 - 145 mmol/L Final    Potassium 2024 4.2  3.5 - 5.1 mmol/L Final    Chloride 2024 106  95 - 110 mmol/L Final    CO2 2024 28  23 - 29 mmol/L Final    Glucose 2024 53 (L)  70 - 110 mg/dL Final    BUN 2024 9  6 - 20 mg/dL Final    Creatinine 2024 0.8  0.5 - 1.4 mg/dL Final    Calcium 2024 9.5  8.7 - 10.5 mg/dL Final    Total Protein 2024 7.0  6.0 - 8.4 g/dL Final    Albumin 2024 4.2  3.5 - 5.2 g/dL Final    Total Bilirubin 2024 0.2  0.1 - 1.0 mg/dL Final    Alkaline Phosphatase 2024 60  55 - 135 U/L Final    AST 2024 13  10 - 40 U/L Final    ALT 2024 15  10 - 44 U/L Final    eGFR 2024 >60  >60 mL/min/1.73 m^2 Final    Anion Gap 2024 7 (L)  8 - 16 mmol/L Final    TSH 2024 1.055   0.400 - 4.000 uIU/mL Final    Cholesterol 06/26/2024 187  120 - 199 mg/dL Final    Triglycerides 06/26/2024 74  30 - 150 mg/dL Final    HDL 06/26/2024 43  40 - 75 mg/dL Final    LDL Cholesterol 06/26/2024 129.2  63.0 - 159.0 mg/dL Final    HDL/Cholesterol Ratio 06/26/2024 23.0  20.0 - 50.0 % Final    Total Cholesterol/HDL Ratio 06/26/2024 4.3  2.0 - 5.0 Final    Non-HDL Cholesterol 06/26/2024 144  mg/dL Final    Hemoglobin A1C 06/26/2024 5.2  4.0 - 5.6 % Final    Estimated Avg Glucose 06/26/2024 103  68 - 131 mg/dL Final    WBC 06/26/2024 7.74  3.90 - 12.70 K/uL Final    RBC 06/26/2024 4.40  4.00 - 5.40 M/uL Final    Hemoglobin 06/26/2024 13.3  12.0 - 16.0 g/dL Final    Hematocrit 06/26/2024 41.3  37.0 - 48.5 % Final    MCV 06/26/2024 94  82 - 98 fL Final    MCH 06/26/2024 30.2  27.0 - 31.0 pg Final    MCHC 06/26/2024 32.2  32.0 - 36.0 g/dL Final    RDW 06/26/2024 13.7  11.5 - 14.5 % Final    Platelets 06/26/2024 245  150 - 450 K/uL Final    MPV 06/26/2024 9.0 (L)  9.2 - 12.9 fL Final    Immature Granulocytes 06/26/2024 0.5  0.0 - 0.5 % Final    Gran # (ANC) 06/26/2024 5.4  1.8 - 7.7 K/uL Final    Immature Grans (Abs) 06/26/2024 0.04  0.00 - 0.04 K/uL Final    Lymph # 06/26/2024 1.3  1.0 - 4.8 K/uL Final    Mono # 06/26/2024 0.7  0.3 - 1.0 K/uL Final    Eos # 06/26/2024 0.3  0.0 - 0.5 K/uL Final    Baso # 06/26/2024 0.04  0.00 - 0.20 K/uL Final    nRBC 06/26/2024 0  0 /100 WBC Final    Gran % 06/26/2024 69.1  38.0 - 73.0 % Final    Lymph % 06/26/2024 17.1 (L)  18.0 - 48.0 % Final    Mono % 06/26/2024 8.9  4.0 - 15.0 % Final    Eosinophil % 06/26/2024 3.9  0.0 - 8.0 % Final    Basophil % 06/26/2024 0.5  0.0 - 1.9 % Final    Differential Method 06/26/2024 Automated   Final    Vit D, 25-Hydroxy 06/26/2024 30  30 - 96 ng/mL Final    Vitamin B-12 06/26/2024 1227 (H)  210 - 950 pg/mL Final       No past medical history on file.  Past Surgical History:   Procedure Laterality Date    AUGMENTATION OF BREAST  2004    BREAST  SURGERY  2004     SECTION  10/17/2017    CHONDROPLASTY OF KNEE  09/15/2022    Procedure: CHONDROPLASTY, KNEE;  Surgeon: Josiah Butler MD;  Location: [facility] OR;  Service: Orthopedics;;    COSMETIC SURGERY      EXCISION OF MEDIAL MENISCUS OF KNEE  09/15/2022    Procedure: MENISCECTOMY, KNEE, MEDIAL;  Surgeon: Josiah Butler MD;  Location: [facility] OR;  Service: Orthopedics;;    KNEE ARTHROSCOPY W/ ACL RECONSTRUCTION Right 09/15/2022    Procedure: RECONSTRUCTION, KNEE, ACL, ARTHROSCOPIC;  Surgeon: Josiah Butler MD;  Location: [facility] OR;  Service: Orthopedics;  Laterality: Right;  [employer] ACL and meniscal repair instruments, BTB allograft in freezer, interference screws, large frag set with washers  [employer] confirmed Cw     KNEE ARTHROSCOPY W/ MENISCECTOMY Right 2022    Procedure: Right knee arthroscopy, partial meniscectomy, bone grafting of old ACL tunnels;  Surgeon: Josiah Butler MD;  Location: [facility] OR;  Service: Orthopedics;  Laterality: Right;  [employer] ACL instrumentation, Cloward bone graft dowels    MAMMO BREAST STEREOTACTIC BREAST BIOPSY RIGHT       Social History     Tobacco Use    Smoking status: Former     Current packs/day: 0.00     Average packs/day: 1 pack/day for 5.0 years (5.0 ttl pk-yrs)     Types: Cigarettes     Start date: 2012     Quit date: 2017     Years since quittin.7     Passive exposure: Never    Smokeless tobacco: Never   Substance Use Topics    Alcohol use: Not Currently    Drug use: Never     Family History   Problem Relation Name Age of Onset    Drug abuse Mother      COPD Mother      Arthritis Mother      Alcohol abuse Mother      Drug abuse Father      Alcohol abuse Father      Heart attack Father          in his 60's    Coronary artery disease Father      Arthritis Sister half     Heart failure Maternal Grandmother      Diabetes Maternal Grandmother      Diverticulitis Maternal Grandmother      Kidney failure Maternal Grandfather       Diabetes Maternal Grandfather      Heart attack Maternal Grandfather      Colon cancer Neg Hx      Breast cancer Neg Hx      Ovarian cancer Neg Hx      Stroke Neg Hx      Osteoporosis Neg Hx       OB History    Para Term  AB Living   1 1 1         SAB IAB Ectopic Multiple Live Births                  # Outcome Date GA Lbr Justen/2nd Weight Sex Type Anes PTL Lv   1 Term                Current Outpatient Medications:     sertraline (ZOLOFT) 100 MG tablet, Take 1 tablet (100 mg total) by mouth once daily., Disp: 90 tablet, Rfl: 3    vitamin D (VITAMIN D3) 1000 units Tab, Take 1,000 Units by mouth once daily., Disp: , Rfl:     b complex vitamins capsule, Take 1 capsule by mouth once daily., Disp: , Rfl:     naratriptan (AMERGE) 2.5 MG tablet, 2.5 mg at onset of headache, may repeat in 4 hours if needed. Maximum: 5 mg per 24 hours, Disp: 8 tablet, Rfl: 4    progesterone (PROMETRIUM) 100 MG capsule, Take 1 capsule (100 mg total) by mouth every evening. 30-60 minutes before bed, Disp: 30 capsule, Rfl: 11    UNABLE TO FIND, medication name: Testosterone 1% Cream Apply to inner thigh daily, Disp: 30 g, Rfl: 5    valACYclovir (VALTREX) 500 MG tablet, Take 500 mg by mouth 2 (two) times daily. (Patient not taking: Reported on 2024), Disp: , Rfl:     The 10-year ASCVD risk score (Kavita SARKAR, et al., 2019) is: 1.3%    Values used to calculate the score:      Age: 53 years      Sex: Female      Is Non- : No      Diabetic: No      Tobacco smoker: No      Systolic Blood Pressure: 114 mmHg      Is BP treated: No      HDL Cholesterol: 43 mg/dL      Total Cholesterol: 187 mg/dL    Review of Systems:  General: No fever, chills, or weight loss.  Chest: No chest pain, shortness of breath, or palpitations.  Breast: No pain, masses, or nipple discharge.  Vulva: No pain, lesions, or itching.  Vagina: No relaxation, itching, discharge, or lesions.  Abdomen: No pain, nausea, vomiting, diarrhea, or  "constipation.  Urinary: No incontinence, nocturia, frequency, or dysuria.  Extremities:  No leg cramps, edema, or calf pain.  Neurologic: No headaches, dizziness, or visual changes.    Objective:     Vitals:    09/24/24 1023   BP: 114/76   Pulse: 75   Weight: 65 kg (143 lb 3.2 oz)   Height: 5' 10" (1.778 m)   PainSc: 0-No pain     Body mass index is 20.55 kg/m².      Physical Exam: Deferred      Assessment:    Perimenopausal symptoms  -     Ambulatory referral/consult to Gynecology  -     Follicle Stimulating Hormone; Future; Expected date: 09/24/2024  -     Progesterone; Future; Expected date: 09/24/2024  -     Testosterone; Future; Expected date: 09/24/2024  -     Testosterone, Free; Future; Expected date: 09/24/2024  -     Estradiol; Future; Expected date: 09/24/2024  -     progesterone (PROMETRIUM) 100 MG capsule; Take 1 capsule (100 mg total) by mouth every evening. 30-60 minutes before bed  Dispense: 30 capsule; Refill: 11  -     UNABLE TO FIND; medication name: Testosterone 1% Cream Apply to inner thigh daily  Dispense: 30 g; Refill: 5        Plan:   Hormone levels today- discussed that these fluctuate in perimenopause.  Progesterone 100mg QHS  Testosterone 1% Cream 1 click daily- faxed to patio  Counseled on use  Reviewed side effects.   Counseled that this is not contraception and she could still possibly conceive  Continue Mg Glycinate 400-500mg QHS  Continue Zoloft 100mg QHS  Add strength workouts with goal of 3x per week  Increase lean protein with 3 regular meals a day. Goal of 80+g protein daily.  Follow up in 3 months.    Instructed patient to call if she experiences any side effects or has any questions.    I spent a total of 45 minutes on the day of the visit.This includes face to face time and non-face to face time preparing to see the patient (eg, review of tests), obtaining and/or reviewing separately obtained history, documenting clinical information in the electronic or other health record, " independently interpreting results and communicating results to the patient/family/caregiver, or care coordinator.

## 2024-09-27 LAB — TESTOST FREE SERPL-MCNC: 0.4 PG/ML

## 2024-11-18 DIAGNOSIS — G43.839 INTRACTABLE MENSTRUAL MIGRAINE WITHOUT STATUS MIGRAINOSUS: ICD-10-CM

## 2024-11-18 RX ORDER — NARATRIPTAN 2.5 MG/1
TABLET ORAL
Qty: 8 TABLET | Refills: 4 | OUTPATIENT
Start: 2024-11-18 | End: 2024-12-18

## 2024-11-18 NOTE — PROGRESS NOTES
Patient ID:   Liza Byrne is a 54 y.o. female.    Chief Complaint:   Follow-up evaluation for right subacromial impingement syndrome    HPI:   The patient is returning today for evaluation of her right shoulder.  She reports a pain level of 6/10.  I have seen her on 2 prior occasions.  At the initial consultation, I felt her exam was consistent with subacromial impingement.  She underwent a corticosteroid injection and had physical therapy prescribed.  She presented at a later time with essentially no pain having had near full pain relief with a corticosteroid injection.  She is now returning today reporting that the pain has returned.  She is having a significant amount of night pain.  She was having pain with activities such as overhead or reaching maneuvers.  She denies any current relieving factors.  Treatment has included the cortisone injection, NSAIDs, and physical therapy.  She has also been performing home exercises.    Medications:    Current Outpatient Medications:     b complex vitamins capsule, Take 1 capsule by mouth once daily., Disp: , Rfl:     progesterone (PROMETRIUM) 100 MG capsule, Take 1 capsule (100 mg total) by mouth every evening. 30-60 minutes before bed, Disp: 30 capsule, Rfl: 11    sertraline (ZOLOFT) 100 MG tablet, Take 1 tablet (100 mg total) by mouth once daily., Disp: 90 tablet, Rfl: 3    UNABLE TO FIND, medication name: Testosterone 1% Cream Apply to inner thigh daily, Disp: 30 g, Rfl: 5    vitamin D (VITAMIN D3) 1000 units Tab, Take 1,000 Units by mouth once daily., Disp: , Rfl:     naratriptan (AMERGE) 2.5 MG tablet, 2.5 mg at onset of headache, may repeat in 4 hours if needed. Maximum: 5 mg per 24 hours, Disp: 8 tablet, Rfl: 4    valACYclovir (VALTREX) 500 MG tablet, Take 500 mg by mouth 2 (two) times daily. (Patient not taking: Reported on 11/19/2024), Disp: , Rfl:     Allergies:  Review of patient's allergies indicates:  No Known Allergies    Past Medical History:  History  reviewed. No pertinent past medical history.     Past Surgical History:  Past Surgical History:   Procedure Laterality Date    AUGMENTATION OF BREAST  2004    BREAST SURGERY       SECTION  10/17/2017    CHONDROPLASTY OF KNEE  09/15/2022    Procedure: CHONDROPLASTY, KNEE;  Surgeon: Josiah Butler MD;  Location: [facility] OR;  Service: Orthopedics;;    COSMETIC SURGERY      EXCISION OF MEDIAL MENISCUS OF KNEE  09/15/2022    Procedure: MENISCECTOMY, KNEE, MEDIAL;  Surgeon: Josiah Butler MD;  Location: [facility] OR;  Service: Orthopedics;;    KNEE ARTHROSCOPY W/ ACL RECONSTRUCTION Right 09/15/2022    Procedure: RECONSTRUCTION, KNEE, ACL, ARTHROSCOPIC;  Surgeon: Josiah Butler MD;  Location: [facility] OR;  Service: Orthopedics;  Laterality: Right;  [employer] ACL and meniscal repair instruments, BTB allograft in freezer, interference screws, large frag set with washers  [employer] confirmed Cw     KNEE ARTHROSCOPY W/ MENISCECTOMY Right 2022    Procedure: Right knee arthroscopy, partial meniscectomy, bone grafting of old ACL tunnels;  Surgeon: Josiah Butler MD;  Location: [facility] OR;  Service: Orthopedics;  Laterality: Right;  [employer] ACL instrumentation, Cloward bone graft dowels    MAMMO BREAST STEREOTACTIC BREAST BIOPSY RIGHT         Social History:  Social History     Occupational History    Not on file   Tobacco Use    Smoking status: Former     Current packs/day: 0.00     Average packs/day: 1 pack/day for 5.0 years (5.0 ttl pk-yrs)     Types: Cigarettes     Start date: 2012     Quit date: 2017     Years since quittin.8     Passive exposure: Never    Smokeless tobacco: Never   Substance and Sexual Activity    Alcohol use: Not Currently    Drug use: Never    Sexual activity: Yes     Partners: Male     Birth control/protection: None       Family History:  Family History   Problem Relation Name Age of Onset    Drug abuse Mother      COPD Mother      Arthritis Mother      Alcohol  abuse Mother      Drug abuse Father      Alcohol abuse Father      Heart attack Father          in his 60's    Coronary artery disease Father      Arthritis Sister half     Heart failure Maternal Grandmother      Diabetes Maternal Grandmother      Diverticulitis Maternal Grandmother      Kidney failure Maternal Grandfather      Diabetes Maternal Grandfather      Heart attack Maternal Grandfather      Colon cancer Neg Hx      Breast cancer Neg Hx      Ovarian cancer Neg Hx      Stroke Neg Hx      Osteoporosis Neg Hx          ROS:  Review of Systems   Musculoskeletal:  Positive for joint pain, muscle weakness and myalgias.   Neurological:  Negative for numbness and paresthesias.   All other systems reviewed and are negative.      Vitals:  There were no vitals taken for this visit.    Physical Examination:  Comprehensive Orthopaedic Musculoskeletal Exam    General      Constitutional: appears stated age, well-developed and well-nourished    Scleral icterus: no    Labored breathing: no    Psychiatric: normal mood and affect and no acute distress    Neurological: alert and oriented x3    Skin: intact    Lymphadenopathy: none     Ortho Exam   Right shoulder exam:  Upon inspection, no visible deformity or atrophy of the right shoulder.    There is some tenderness over the anterolateral aspect of the shoulder.  Nontender over the AC joint.    Range of motion today reveals active forward elevation to about 150, passive elevation 170, external rotation at the side 45, internal rotation to T12.    Rotator cuff strength exam is 4+ out of 5 in elevation, 5/5 in internal and external rotation.    Positive Neer impingement sign.  Positive Reis impingement sign.  Negative speed's.  Negative Yergason's.    Assessment:  1. Chronic right shoulder pain    2. Subacromial impingement, right        Plan:  The patient has failed prior conservative treatment.  She had excellent pain relief with a prior corticosteroid injection but the  pain has now returned.  I have discussed management going forward.  I have explained that I would prefer that she undergo MRI scan of the right shoulder to better evaluate her rotator cuff.  After she completes the study, I will contact her with the results and direct further care.     Orders Placed This Encounter    MRI Shoulder Without Contrast Right     No follow-ups on file.

## 2024-11-19 ENCOUNTER — OFFICE VISIT (OUTPATIENT)
Dept: ORTHOPEDICS | Facility: CLINIC | Age: 54
End: 2024-11-19
Payer: COMMERCIAL

## 2024-11-19 DIAGNOSIS — M75.41 SUBACROMIAL IMPINGEMENT, RIGHT: ICD-10-CM

## 2024-11-19 DIAGNOSIS — G89.29 CHRONIC RIGHT SHOULDER PAIN: Primary | ICD-10-CM

## 2024-11-19 DIAGNOSIS — M25.511 CHRONIC RIGHT SHOULDER PAIN: Primary | ICD-10-CM

## 2024-11-19 PROCEDURE — 1160F RVW MEDS BY RX/DR IN RCRD: CPT | Mod: CPTII,S$GLB,, | Performed by: ORTHOPAEDIC SURGERY

## 2024-11-19 PROCEDURE — 3044F HG A1C LEVEL LT 7.0%: CPT | Mod: CPTII,S$GLB,, | Performed by: ORTHOPAEDIC SURGERY

## 2024-11-19 PROCEDURE — 99214 OFFICE O/P EST MOD 30 MIN: CPT | Mod: S$GLB,,, | Performed by: ORTHOPAEDIC SURGERY

## 2024-11-19 PROCEDURE — 1159F MED LIST DOCD IN RCRD: CPT | Mod: CPTII,S$GLB,, | Performed by: ORTHOPAEDIC SURGERY

## 2024-11-19 PROCEDURE — 99999 PR PBB SHADOW E&M-EST. PATIENT-LVL III: CPT | Mod: PBBFAC,,, | Performed by: ORTHOPAEDIC SURGERY

## 2024-11-23 ENCOUNTER — HOSPITAL ENCOUNTER (OUTPATIENT)
Dept: RADIOLOGY | Facility: HOSPITAL | Age: 54
Discharge: HOME | End: 2024-11-23
Attending: ORTHOPAEDIC SURGERY
Payer: COMMERCIAL

## 2024-11-23 DIAGNOSIS — M25.511 CHRONIC RIGHT SHOULDER PAIN: ICD-10-CM

## 2024-11-23 DIAGNOSIS — G89.29 CHRONIC RIGHT SHOULDER PAIN: ICD-10-CM

## 2024-11-23 PROCEDURE — 73221 MRI JOINT UPR EXTREM W/O DYE: CPT | Mod: 26,RT,, | Performed by: RADIOLOGY

## 2024-11-23 PROCEDURE — 73221 MRI JOINT UPR EXTREM W/O DYE: CPT | Mod: TC,RT

## 2024-12-02 ENCOUNTER — TELEPHONE (OUTPATIENT)
Dept: ORTHOPEDICS | Facility: CLINIC | Age: 54
End: 2024-12-02
Payer: COMMERCIAL

## 2024-12-02 NOTE — TELEPHONE ENCOUNTER
"I spoke to the patient about her recent MRI of the right shoulder. Full report is below. The MRI shows lots of bursitis. One one view, there may be a partial tear on the bursal aspect of the supraspinatus.     She is going to contact the office for an appointment when she returns to [facility]. She will probably proceed with PT/OT and CSI.    MRI Shoulder Without Contrast Right  Narrative: EXAMINATION:  MRI SHOULDER WITHOUT CONTRAST RIGHT    CLINICAL HISTORY:  Shoulder pain, rotator cuff disorder suspected, xray done;  Pain in right shoulder    TECHNIQUE:  Multiplanar multisequence MRI examination of RIGHT shoulder.    COMPARISON:  None.    FINDINGS:  ROTATOR CUFF:    Supraspinatus: Bursal surface irregularity posterior margin supraspinatus.  Moderate tendinosis.  Myotendinous strain-fluid/fatty infiltration.    Infraspinatus: Bursal surface irregularity anterior margin infraspinatus.  No tendinosis.    Subscapularis: Intact.  No tendinosis.    Teres Minor: Intact.  No tendinosis.    There is complex fluid within the subacromial/subdeltoid bursa consistent with bursitis.    LABRUM: Superior labrum appears grossly intact on this standard non arthrogram exam.Posterior aspect of superior labrum ( "peel-back" location) appears intact. Posterior labrum is unremarkable.Anterior inferior labrum appears intact. IGHL:Intact.    LONG HEAD BICEPS TENDON: Located within bicipital groove and intact.Biceps-labral anchor is intact. No tendinosis.  Extra-articular tenosynovitis. Rotator Interval is abnormal with synovial thickening/increased signal.. Biceps pulley is intact.    BONES: No evident fracture.Visualized marrow within normal limits. AC joint demonstrates normal alignment with moderate hypertrophy.No significant osteo-acromial outlet narrowing.  There is no evident os acromiale.    CARTILAGE: Humeral head and glenoid cartilage preserved without focal defects. No subchondral marrow edema.  No synovial abnormality or " intra-articular loose bodies. Glenoid fossa demonstrates no sclerosis.    MUSCLES:  Normal bulk and signal.  Impression: Bursal surface crossover zone (posterior margin supraspinatus anterior margin infraspinatus) with myotendinous strain manifest by fluid/edema and adjacent mild fatty infiltration of the supraspinatus.  No evidence for full-thickness tear with retraction.    Subacromial subdeltoid bursitis.    Electronically signed by: Alonzo Hobbs MD  Date:    11/23/2024  Time:    08:00

## 2024-12-06 ENCOUNTER — OFFICE VISIT (OUTPATIENT)
Dept: ORTHOPEDICS | Facility: CLINIC | Age: 54
End: 2024-12-06
Payer: COMMERCIAL

## 2024-12-06 VITALS — HEIGHT: 70 IN | BODY MASS INDEX: 20.83 KG/M2 | WEIGHT: 145.5 LBS

## 2024-12-06 DIAGNOSIS — M75.41 SUBACROMIAL IMPINGEMENT, RIGHT: Primary | ICD-10-CM

## 2024-12-06 PROCEDURE — 99214 OFFICE O/P EST MOD 30 MIN: CPT | Mod: 25,S$GLB,, | Performed by: ORTHOPAEDIC SURGERY

## 2024-12-06 PROCEDURE — 1159F MED LIST DOCD IN RCRD: CPT | Mod: CPTII,S$GLB,, | Performed by: ORTHOPAEDIC SURGERY

## 2024-12-06 PROCEDURE — 1160F RVW MEDS BY RX/DR IN RCRD: CPT | Mod: CPTII,S$GLB,, | Performed by: ORTHOPAEDIC SURGERY

## 2024-12-06 PROCEDURE — 3008F BODY MASS INDEX DOCD: CPT | Mod: CPTII,S$GLB,, | Performed by: ORTHOPAEDIC SURGERY

## 2024-12-06 PROCEDURE — 99999 PR PBB SHADOW E&M-EST. PATIENT-LVL III: CPT | Mod: PBBFAC,,, | Performed by: ORTHOPAEDIC SURGERY

## 2024-12-06 PROCEDURE — 3044F HG A1C LEVEL LT 7.0%: CPT | Mod: CPTII,S$GLB,, | Performed by: ORTHOPAEDIC SURGERY

## 2024-12-06 RX ORDER — TRIAMCINOLONE ACETONIDE 40 MG/ML
40 INJECTION, SUSPENSION INTRA-ARTICULAR; INTRAMUSCULAR
Status: SHIPPED | OUTPATIENT
Start: 2024-12-06

## 2024-12-06 RX ORDER — LIDOCAINE HYDROCHLORIDE 10 MG/ML
4 INJECTION, SOLUTION INFILTRATION; PERINEURAL
Status: SHIPPED | OUTPATIENT
Start: 2024-12-06

## 2024-12-06 RX ADMIN — TRIAMCINOLONE ACETONIDE 40 MG: 40 INJECTION, SUSPENSION INTRA-ARTICULAR; INTRAMUSCULAR at 09:12

## 2024-12-06 RX ADMIN — LIDOCAINE HYDROCHLORIDE 4 ML: 10 INJECTION, SOLUTION INFILTRATION; PERINEURAL at 09:12

## 2024-12-06 NOTE — PROGRESS NOTES
Patient ID:   Liza Byrne is a 54 y.o. female.    Chief Complaint:   Follow-up evaluation for right shoulder pain    HPI:   The patient is returning today for evaluation of her right shoulder.  She reports a pain level of 8/10.  The pain is along the anterior and lateral aspects of the shoulder.  She has a exquisite pain when she attempts to abduct the shoulder.  She is experiencing night pain.  She did recently complete MRI scan of the right shoulder.  Past treatment has included a subacromial injection which provided her with near complete pain relief but the pain returned.    Medications:    Current Outpatient Medications:     b complex vitamins capsule, Take 1 capsule by mouth once daily., Disp: , Rfl:     progesterone (PROMETRIUM) 100 MG capsule, Take 1 capsule (100 mg total) by mouth every evening. 30-60 minutes before bed, Disp: 30 capsule, Rfl: 11    sertraline (ZOLOFT) 100 MG tablet, Take 1 tablet (100 mg total) by mouth once daily., Disp: 90 tablet, Rfl: 3    UNABLE TO FIND, medication name: Testosterone 1% Cream Apply to inner thigh daily, Disp: 30 g, Rfl: 5    vitamin D (VITAMIN D3) 1000 units Tab, Take 1,000 Units by mouth once daily., Disp: , Rfl:     naratriptan (AMERGE) 2.5 MG tablet, 2.5 mg at onset of headache, may repeat in 4 hours if needed. Maximum: 5 mg per 24 hours, Disp: 8 tablet, Rfl: 4    valACYclovir (VALTREX) 500 MG tablet, Take 500 mg by mouth 2 (two) times daily. (Patient not taking: Reported on 2024), Disp: , Rfl:     Allergies:  Review of patient's allergies indicates:  No Known Allergies    Past Medical History:  History reviewed. No pertinent past medical history.     Past Surgical History:  Past Surgical History:   Procedure Laterality Date    AUGMENTATION OF BREAST  2004    BREAST SURGERY  2004     SECTION  10/17/2017    CHONDROPLASTY OF KNEE  09/15/2022    Procedure: CHONDROPLASTY, KNEE;  Surgeon: Josiah Butler MD;  Location: [facility] OR;  Service: Orthopedics;;     COSMETIC SURGERY      EXCISION OF MEDIAL MENISCUS OF KNEE  09/15/2022    Procedure: MENISCECTOMY, KNEE, MEDIAL;  Surgeon: Josiah Butler MD;  Location: [facility] OR;  Service: Orthopedics;;    KNEE ARTHROSCOPY W/ ACL RECONSTRUCTION Right 09/15/2022    Procedure: RECONSTRUCTION, KNEE, ACL, ARTHROSCOPIC;  Surgeon: Josiah Butler MD;  Location: [facility] OR;  Service: Orthopedics;  Laterality: Right;  [facility] ACL and meniscal repair instruments, BTB allograft in freezer, interference screws, large frag set with washers  [employer] confirmed Cw     KNEE ARTHROSCOPY W/ MENISCECTOMY Right 2022    Procedure: Right knee arthroscopy, partial meniscectomy, bone grafting of old ACL tunnels;  Surgeon: Josiah Butler MD;  Location: [facility] OR;  Service: Orthopedics;  Laterality: Right;  [facility] ACL instrumentation, Cloward bone graft dowels    MAMMO BREAST STEREOTACTIC BREAST BIOPSY RIGHT         Social History:  Social History     Occupational History    Not on file   Tobacco Use    Smoking status: Former     Current packs/day: 0.00     Average packs/day: 1 pack/day for 5.0 years (5.0 ttl pk-yrs)     Types: Cigarettes     Start date: 2012     Quit date: 2017     Years since quittin.9     Passive exposure: Never    Smokeless tobacco: Never   Substance and Sexual Activity    Alcohol use: Not Currently    Drug use: Never    Sexual activity: Yes     Partners: Male     Birth control/protection: None       Family History:  Family History   Problem Relation Name Age of Onset    Drug abuse Mother      COPD Mother      Arthritis Mother      Alcohol abuse Mother      Drug abuse Father      Alcohol abuse Father      Heart attack Father          in his 60's    Coronary artery disease Father      Arthritis Sister half     Heart failure Maternal Grandmother      Diabetes Maternal Grandmother      Diverticulitis Maternal Grandmother      Kidney failure Maternal Grandfather      Diabetes Maternal Grandfather       "Heart attack Maternal Grandfather      Colon cancer Neg Hx      Breast cancer Neg Hx      Ovarian cancer Neg Hx      Stroke Neg Hx      Osteoporosis Neg Hx          ROS:  Review of Systems   Musculoskeletal:  Positive for joint pain, joint swelling and myalgias.   All other systems reviewed and are negative.      Vitals:  Ht 5' 10" (1.778 m)   Wt 66 kg (145 lb 8.1 oz)   BMI 20.88 kg/m²     Physical Examination:  Comprehensive Orthopaedic Musculoskeletal Exam    General      Constitutional: appears stated age, well-developed and well-nourished    Scleral icterus: no    Labored breathing: no    Psychiatric: normal mood and affect and no acute distress    Neurological: alert and oriented x3    Skin: intact    Lymphadenopathy: none     Ortho Exam   Right shoulder exam:  Upon inspection no visible deformity.    Tender to palpation over the anterolateral aspect of the shoulder.    Range of motion today reveals active elevation 30, passive 170.   Rotator cuff strength is 4+ out of 5 in elevation, 5/5 internal and external rotation.  Positive impingement maneuvers both Neer and Reis.    Imaging:  I have independently reviewed the following imaging studies performed at [facility]:    MRI Shoulder Without Contrast Right  Narrative: EXAMINATION:  MRI SHOULDER WITHOUT CONTRAST RIGHT    CLINICAL HISTORY:  Shoulder pain, rotator cuff disorder suspected, xray done;  Pain in right shoulder    TECHNIQUE:  Multiplanar multisequence MRI examination of RIGHT shoulder.    COMPARISON:  None.    FINDINGS:  ROTATOR CUFF:    Supraspinatus: Bursal surface irregularity posterior margin supraspinatus.  Moderate tendinosis.  Myotendinous strain-fluid/fatty infiltration.    Infraspinatus: Bursal surface irregularity anterior margin infraspinatus.  No tendinosis.    Subscapularis: Intact.  No tendinosis.    Teres Minor: Intact.  No tendinosis.    There is complex fluid within the subacromial/subdeltoid bursa consistent with bursitis.    LABRUM: " "Superior labrum appears grossly intact on this standard non arthrogram exam.Posterior aspect of superior labrum ( "peel-back" location) appears intact. Posterior labrum is unremarkable.Anterior inferior labrum appears intact. IGHL:Intact.    LONG HEAD BICEPS TENDON: Located within bicipital groove and intact.Biceps-labral anchor is intact. No tendinosis.  Extra-articular tenosynovitis. Rotator Interval is abnormal with synovial thickening/increased signal.. Biceps pulley is intact.    BONES: No evident fracture.Visualized marrow within normal limits. AC joint demonstrates normal alignment with moderate hypertrophy.No significant osteo-acromial outlet narrowing.  There is no evident os acromiale.    CARTILAGE: Humeral head and glenoid cartilage preserved without focal defects. No subchondral marrow edema.  No synovial abnormality or intra-articular loose bodies. Glenoid fossa demonstrates no sclerosis.    MUSCLES:  Normal bulk and signal.  Impression: Bursal surface crossover zone (posterior margin supraspinatus anterior margin infraspinatus) with myotendinous strain manifest by fluid/edema and adjacent mild fatty infiltration of the supraspinatus.  No evidence for full-thickness tear with retraction.    Subacromial subdeltoid bursitis.    Electronically signed by: Alonzo Hobbs MD  Date:    11/23/2024  Time:    08:00    Assessment:  1. Subacromial impingement, right      Plan:  I reviewed the MRI findings in detail with the patient.  On one of the coronal image there may be a partial-thickness bursal sided tear of the supraspinatus.  There is a significant amount of subacromial and subdeltoid bursitis.  I have offered her a repeat corticosteroid injection into her right subacromial space.  We will do this today.  In addition, I have advised formal supervised physical therapy.  She will reassess after she has completed physical therapy and return if she is still having significant pain.    Orders Placed This " Encounter    Ambulatory referral/consult to Physical/Occupational Therapy     No follow-ups on file.

## 2024-12-06 NOTE — PROCEDURES
Large Joint Aspiration/Injection: R subacromial bursa    Date/Time: 12/6/2024 9:00 AM    Performed by: Josiah Butler MD  Authorized by: Josiah Butler MD    Consent Done?:  Yes (Verbal)  Indications:  Pain  Site marked: the procedure site was marked    Timeout: prior to procedure the correct patient, procedure, and site was verified    Prep: patient was prepped and draped in usual sterile fashion      Local anesthesia used?: Yes    Local anesthetic:  Topical anesthetic and lidocaine 1% without epinephrine  Anesthetic total (ml):  4      Details:  Needle Size:  22 G  Ultrasonic Guidance for needle placement?: No    Approach:  Posterior  Location:  Shoulder  Site:  R subacromial bursa  Medications:  4 mL LIDOcaine HCL 10 mg/ml (1%) 10 mg/mL (1 %); 40 mg triamcinolone acetonide 40 mg/mL  Patient tolerance:  Patient tolerated the procedure well with no immediate complications

## 2024-12-17 ENCOUNTER — OFFICE VISIT (OUTPATIENT)
Dept: OBSTETRICS AND GYNECOLOGY | Facility: CLINIC | Age: 54
End: 2024-12-17
Payer: COMMERCIAL

## 2024-12-17 VITALS
BODY MASS INDEX: 21.3 KG/M2 | WEIGHT: 148.81 LBS | DIASTOLIC BLOOD PRESSURE: 69 MMHG | SYSTOLIC BLOOD PRESSURE: 105 MMHG | HEIGHT: 70 IN

## 2024-12-17 DIAGNOSIS — N95.1 PERIMENOPAUSAL SYMPTOMS: Primary | ICD-10-CM

## 2024-12-17 DIAGNOSIS — G43.809 OTHER MIGRAINE WITHOUT STATUS MIGRAINOSUS, NOT INTRACTABLE: ICD-10-CM

## 2024-12-17 PROCEDURE — 1159F MED LIST DOCD IN RCRD: CPT | Mod: CPTII,S$GLB,, | Performed by: PHYSICIAN ASSISTANT

## 2024-12-17 PROCEDURE — 3008F BODY MASS INDEX DOCD: CPT | Mod: CPTII,S$GLB,, | Performed by: PHYSICIAN ASSISTANT

## 2024-12-17 PROCEDURE — 1160F RVW MEDS BY RX/DR IN RCRD: CPT | Mod: CPTII,S$GLB,, | Performed by: PHYSICIAN ASSISTANT

## 2024-12-17 PROCEDURE — 99999 PR PBB SHADOW E&M-EST. PATIENT-LVL III: CPT | Mod: PBBFAC,,, | Performed by: PHYSICIAN ASSISTANT

## 2024-12-17 PROCEDURE — 3078F DIAST BP <80 MM HG: CPT | Mod: CPTII,S$GLB,, | Performed by: PHYSICIAN ASSISTANT

## 2024-12-17 PROCEDURE — 3044F HG A1C LEVEL LT 7.0%: CPT | Mod: CPTII,S$GLB,, | Performed by: PHYSICIAN ASSISTANT

## 2024-12-17 PROCEDURE — 3074F SYST BP LT 130 MM HG: CPT | Mod: CPTII,S$GLB,, | Performed by: PHYSICIAN ASSISTANT

## 2024-12-17 PROCEDURE — 99213 OFFICE O/P EST LOW 20 MIN: CPT | Mod: S$GLB,,, | Performed by: PHYSICIAN ASSISTANT

## 2024-12-17 RX ORDER — PROGESTERONE 100 MG/1
200 CAPSULE ORAL NIGHTLY
Qty: 30 CAPSULE | Refills: 11 | Status: SHIPPED | OUTPATIENT
Start: 2024-12-17 | End: 2025-12-17

## 2024-12-17 RX ORDER — ESTRADIOL 0.03 MG/D
FILM, EXTENDED RELEASE TRANSDERMAL
Qty: 8 PATCH | Refills: 11 | Status: SHIPPED | OUTPATIENT
Start: 2024-12-17 | End: 2025-12-17

## 2024-12-17 NOTE — PROGRESS NOTES
Subjective:      Liza Byrne is a 54 y.o. female who presents for follow-up of hormone replacement therapy for perimenopausal symptoms.  At her last visit on 2024, she reported increased irritability, fatigue, brain fog, bilateral swelling around her ankles, low libido and headaches.       2024 LABS  FSH 13.79  Progesterone 0.3  Testosterone 22  Free Testosterone 0.4  Estradiol 93    PLAN on 2024:  Progesterone 100mg QHS  Testosterone 1% Cream 1 click daily- [facility]ed [facility]    The patient reports that mood and libido have improved slightly. Continues to have fatigue and brain fog. Denies adverse side effects. Having monthly periods and lighter since starting HRT. History of migraines without aura before her periods. She sees neurology for this. Migraines before period worse in perimenopause.    PCP: Yelitza Garvin MD    Routine labs: 2024  WWE: 2022 at [facility]  Pap smear: 2022  Mammogram: 2024 TC  8.65%   Colonoscopy: 2022 repeat in 10 years    Lab Visit on 2024   Component Date Value Ref Range Status    Follicle Stimulating Hormone 2024 13.79  See Text mIU/mL Final    Progesterone 2024 0.3  See Text ng/mL Final    Testosterone, Total 2024 22  5 - 73 ng/dL Final    Testosterone, Free 2024 0.4  pg/mL Final    Estradiol 2024 93  See Text pg/mL Final       No past medical history on file.  Past Surgical History:   Procedure Laterality Date    AUGMENTATION OF BREAST      BREAST SURGERY  2004     SECTION  10/17/2017    CHONDROPLASTY OF KNEE  09/15/2022    Procedure: CHONDROPLASTY, KNEE;  Surgeon: Josiah Butler MD;  Location: [facility] OR;  Service: Orthopedics;;    COSMETIC SURGERY      EXCISION OF MEDIAL MENISCUS OF KNEE  09/15/2022    Procedure: MENISCECTOMY, KNEE, MEDIAL;  Surgeon: Josiah Butler MD;  Location: [facility] OR;  Service: Orthopedics;;    KNEE ARTHROSCOPY W/ ACL RECONSTRUCTION Right 09/15/2022    Procedure:  RECONSTRUCTION, KNEE, ACL, ARTHROSCOPIC;  Surgeon: Josiah Butler MD;  Location: [facility] OR;  Service: Orthopedics;  Laterality: Right;  [facility] ACL and meniscal repair instruments, BTB allograft in freezer, interference screws, large frag set with washers  [facility] confirmed Cw     KNEE ARTHROSCOPY W/ MENISCECTOMY Right 2022    Procedure: Right knee arthroscopy, partial meniscectomy, bone grafting of old ACL tunnels;  Surgeon: Josiah Butler MD;  Location: [facility] OR;  Service: Orthopedics;  Laterality: Right;  [facility] ACL instrumentation, Cloward bone graft dowels    MAMMO BREAST STEREOTACTIC BREAST BIOPSY RIGHT       Social History     Tobacco Use    Smoking status: Former     Current packs/day: 0.00     Average packs/day: 1 pack/day for 5.0 years (5.0 ttl pk-yrs)     Types: Cigarettes     Start date: 2012     Quit date: 2017     Years since quittin.9     Passive exposure: Never    Smokeless tobacco: Never   Substance Use Topics    Alcohol use: Not Currently    Drug use: Never     Family History   Problem Relation Name Age of Onset    Drug abuse Mother      COPD Mother      Arthritis Mother      Alcohol abuse Mother      Drug abuse Father      Alcohol abuse Father      Heart attack Father          in his 60's    Coronary artery disease Father      Arthritis Sister half     Heart failure Maternal Grandmother      Diabetes Maternal Grandmother      Diverticulitis Maternal Grandmother      Kidney failure Maternal Grandfather      Diabetes Maternal Grandfather      Heart attack Maternal Grandfather      Colon cancer Neg Hx      Breast cancer Neg Hx      Ovarian cancer Neg Hx      Stroke Neg Hx      Osteoporosis Neg Hx       OB History    Para Term  AB Living   1 1 1         SAB IAB Ectopic Multiple Live Births                  # Outcome Date GA Lbr Justen/2nd Weight Sex Type Anes PTL Lv   1 Term                Current Outpatient Medications:     b complex vitamins capsule, Take  "1 capsule by mouth once daily., Disp: , Rfl:     estradioL (VIVELLE-DOT) 0.025 mg/24 hr, Apply patch BIW (ie Monday and Thursday), Disp: 8 patch, Rfl: 11    naratriptan (AMERGE) 2.5 MG tablet, 2.5 mg at onset of headache, may repeat in 4 hours if needed. Maximum: 5 mg per 24 hours, Disp: 8 tablet, Rfl: 4    progesterone (PROMETRIUM) 100 MG capsule, Take 2 capsules (200 mg total) by mouth every evening. 30-60 minutes before bed, Disp: 30 capsule, Rfl: 11    sertraline (ZOLOFT) 100 MG tablet, Take 1 tablet (100 mg total) by mouth once daily., Disp: 90 tablet, Rfl: 3    UNABLE TO FIND, medication name: Testosterone 2% Cream Apply to inner thigh daily, Disp: 30 g, Rfl: 5    valACYclovir (VALTREX) 500 MG tablet, Take 500 mg by mouth 2 (two) times daily. (Patient not taking: Reported on 9/24/2024), Disp: , Rfl:     vitamin D (VITAMIN D3) 1000 units Tab, Take 1,000 Units by mouth once daily., Disp: , Rfl:   No current facility-administered medications for this visit.    Facility-Administered Medications Ordered in Other Visits:     LIDOcaine HCL 10 mg/ml (1%) injection 4 mL, 4 mL, , , Josiah Butler MD, 4 mL at 12/06/24 0900    triamcinolone acetonide injection 40 mg, 40 mg, Intra-articular, , Josiah Butler MD, 40 mg at 12/06/24 0900    Review of Systems:  General: No fever, chills, or weight loss.  Chest: No chest pain, shortness of breath, or palpitations.  Breast: No pain, masses, or nipple discharge.  Vulva: No pain, lesions, or itching.  Vagina: No relaxation, itching, discharge, or lesions.  Abdomen: No pain, nausea, vomiting, diarrhea, or constipation.  Urinary: No incontinence, nocturia, frequency, or dysuria.  Extremities:  No leg cramps, edema, or calf pain.  Neurologic: No headaches, dizziness, or visual changes.    Objective:     Vitals:    12/17/24 0855   BP: 105/69   Weight: 67.5 kg (148 lb 12.8 oz)   Height: 5' 10" (1.778 m)   PainSc: 0-No pain     Body mass index is 21.35 kg/m².      Physical " Exam: Deferred       Assessment:    Perimenopausal symptoms  -     UNABLE TO FIND; medication name: Testosterone 2% Cream Apply to inner thigh daily  Dispense: 30 g; Refill: 5  -     progesterone (PROMETRIUM) 100 MG capsule; Take 2 capsules (200 mg total) by mouth every evening. 30-60 minutes before bed  Dispense: 30 capsule; Refill: 11  -     estradioL (VIVELLE-DOT) 0.025 mg/24 hr; Apply patch BIW (ie Monday and Thursday)  Dispense: 8 patch; Refill: 11    Other migraine without status migrainosus, not intractable        Plan:   Increase testosterone to 2% Cream- faxed to [facility]  Increase progesterone to 200mg QHS  Start vivelle dot 0.025mg BIW  Counseled on use and symptoms of elevated estradiol.  Discussed creatine supplement.  Follow up in 3 months.    Instructed patient to call if she experiences any side effects or has any questions.    I spent a total of 25 minutes on the day of the visit.This includes face to face time and non-face to face time preparing to see the patient (eg, review of tests), obtaining and/or reviewing separately obtained history, documenting clinical information in the electronic or other health record, independently interpreting results and communicating results to the patient/family/caregiver, or care coordinator.

## 2025-01-30 ENCOUNTER — OFFICE VISIT (OUTPATIENT)
Dept: NEUROLOGY | Facility: CLINIC | Age: 55
End: 2025-01-30
Payer: COMMERCIAL

## 2025-01-30 DIAGNOSIS — Z79.899 MEDICATION MANAGEMENT: ICD-10-CM

## 2025-01-30 DIAGNOSIS — R51.9 WORSENING HEADACHES: ICD-10-CM

## 2025-01-30 DIAGNOSIS — G43.839 INTRACTABLE MENSTRUAL MIGRAINE WITHOUT STATUS MIGRAINOSUS: Primary | ICD-10-CM

## 2025-01-30 RX ORDER — NAPROXEN 500 MG/1
500 TABLET ORAL 2 TIMES DAILY PRN
Qty: 10 TABLET | Refills: 5 | Status: SHIPPED | OUTPATIENT
Start: 2025-01-30

## 2025-01-30 RX ORDER — NARATRIPTAN 2.5 MG/1
TABLET ORAL
Qty: 8 TABLET | Refills: 5 | Status: SHIPPED | OUTPATIENT
Start: 2025-01-30 | End: 2025-03-01

## 2025-01-30 RX ORDER — METOCLOPRAMIDE 10 MG/1
10 TABLET ORAL 3 TIMES DAILY PRN
Qty: 10 TABLET | Refills: 5 | Status: SHIPPED | OUTPATIENT
Start: 2025-01-30

## 2025-01-30 RX ORDER — UBROGEPANT 100 MG/1
100 TABLET ORAL
Qty: 10 TABLET | Refills: 11 | Status: SHIPPED | OUTPATIENT
Start: 2025-01-30

## 2025-01-30 NOTE — PROGRESS NOTES
Patient ID: [id number]  The patient location is: [facility]   The chief complaint leading to consultation is: f/u on migraines    Visit type: audiovisual    Face to Face time with patient: 23    Each patient to whom he or she provides medical services by telemedicine is:  (1) informed of the relationship between the physician and patient and the respective role of any other health care provider with respect to management of the patient; and (2) notified that he or she may decline to receive medical services by telemedicine and may withdraw from such care at any time.    Notes:     Subjective:     HPI:  Liza Byrne is a very pleasant 54 y.o. RH female with depression, insomnia, and migraines. she is presenting today for f/u on worsening headaches.    Interval history (01/30/2025):  She is reporting 2 headaches per 2 week span that last longer (at least 24 hours)  She is just recovering from the flu.   Period is very irregular. She is on progesterone and testosterone, estradiol was recently added.  Amerge is working to some extent, doesn't let the headache to fully emerge but headache lingers and the next day she wakes up with a migraine.  Takes ibuprofen 400 + tylenol 500 as a back up.    Headache history (6/26/2024):  Age at onset: 46 y/o when her daughter was born  Course over time: stable  Location: occipital radiating to frontal and top of the head  Character:  starts as tension progresses to throbbing  Intensity: 10 on a scale from 1 to 10  Frequency:  x2 per month right before cycles .   Duration: 1 day  Timing: do not seem to be related to any time of the day   Mild/moderate/severe. Work attendance or other daily activities are affected by the headaches.  Aura: preceded by an aura consisting of visual distortion (increased contrast, more prominent edges)  Associated symptoms: N/V, Photosensitivity, and Phonophobia   Associated neurologic symptoms: decreased physical activity and speech  difficulties  Precipitating factors: Menstrual periods (right before)  Aggravating factors: Physical activity   Home treatment: Imitrex with some improvement. Maxalt was just prescribed and hasn't tried it yet.  ER visits: No  Positive Hx of: No Head trauma  Is medication overuse contributing to the patient's current migraine burden: No    She has a brain imaging from a couple of years ago that was completed outside [facility].     Headache Medication history:  AED Neuromodulators  MAOIs  Ergot Alkaloids    Acetazolamide (Diamox) [] Phenelzine (Nardil) [] Dihydroergotamine (Migranal) []   Carbamazepine (Tegretol) [] Tranylcypromine (Parnate) [] Ergotamine (Ergomar) []   Gabapentin (Neurontin) [] Antihistamine/Serotonergic  Triptans    Lacosamide (Vimpat) [] Cyproheptadine (Periactin) [] Almotriptan (Axert) []   Lamotrigine (Lamictal) [] Antihypertensives  Eletriptan (Relpax) []   Levatiracetam (Keppra) [] Atenolol (Tenormin) [] Frovatriptan (Frova) []   Oxcarbazepine (Trileptal) [] Bisoprostol (Zebeta) [] Naratriptan (Amerge)  [x]   Phenobarbital [] Candesartan (Atacand) [] Rizatriptan (Maxalt) []     Nebivolol (Bystolic)  Sumatriptan (Imitrex) [x]   Levetiracetam (Keppra)  Carvedilol (Coreg) [] Zolmitriptan (Zomig) []   Phenytoin (Dilantin) [] Diltiazem (Cardizem) []     Pregabalin (Lyrica) [] Lisinopril (Prinivil, Zestril) [] Combo Abortives    Primidone (Mysoline) [] Metoprolol (Toprol) [] BC Powder []   Tiagabine (Gabatril) [] Nadolol (Corgard) [] Butalbital and Acetaminophen (Bupap) []   Topiramate (Topamax)  (Trokendi) [] Nicardipine (Cardene) []     Vigabatrin (Sabril) [] Nimodipine (Nimotop) [] Butalbital, Acetaminophen, and caffeine (Fioricet) []   Valproic Acid (Depakote) (Divalproex Sodium) [] Propranolol (Inderal) []     Zonisamide (Zonegran) [] Telmisartan (Micardis) [] Butalbital, Aspirin, and caffeine (Fiorinal) []   Benzodiazepines  Timolol (Blocadren) []     Alprazolam (Xanax) [] Verapamil (Calan,  Verelan) [] Butalbital, Caffeine, Acetaminophen, and Codeine (Fioricet with Codeine) []   Diazepam (Valium) [] NSAIDs      Lorazepam (Ativan) [] Acetaminophen (Tylenol) [x]     Clonazepam (Klonopin) [] Acetylsalicylic Acid (Aspirin) [] Butalbital, Caffeine, Aspirin, and Codeine  (Fiorinal with Codeine) []   Antidepressants  Diclofenac (Cambia) []     Amitriptyline (Elavil) [] Ibuprofen (Motrin) [x]     Desipramine (Norpramin) [] Indomethacin (Indocin) [] Aspirin, Caffeine, and Acetaminophen (Excedrin) (Goodys) []   Doxepin (Sinequan) [] Ketoprofen (Orudis) []     Fluoxetine (Prozac) [] Ketorolac (Toradol) [] Acetaminophen, Dichloralphenazone, and Isometheptene (Midrin) []   Imipramine (Tofranil) [] Naproxen (Anaprox) (Aleve) []     Nortriptyline (Pamelor) [] Meclofenamic Acid (Meclomen) []     Venlafaxine (Effexor) [] Meloxicam (Mobic) [] Procedures    Desvenlafazine (Pristiq) [] Monoclonals  Greater occipital nerve block []   Duloxetine (Cymbalta) [] Eptinezumab [] Cervical, Thoracic, Lumbar radiofrequency ablation []   Trazadone [] Erenumab-aooe (Aimovig) [] Spenopalatine ganglion block []   Sertraline (Zoloft) [x] Galcanezumab (Emgality) [] Occipital neuro stimulation []   Protriptyline (Vivactil) [] Fremanazumab-vfrm (Ajovy)  Cervical, Thoracic, Lumbar, Caudal Epidural steroid injection []   Escitalopram (Lexapro) [] Other [] Sacroiliac joint steroid injection []   Celexa [] Memantine (Namenda) [] Transforaminal epidural steroid injection []   Oral CGRP inhibitors  Botox [] Facet joint injections []   Atogepant (Qulipta) [] Baclofen (Lioresal) [] Cervical, Thoracic, Lumbar medial branch blocks []   Rimegepant (Nurtec) [] Methocarbamol (Robaxin) [] Cefaly []   Ubrogepant (Ubrelvy) [] Cyclobenzaprine (Flexeril) [] Gamma Core []    [] Tizanidine (Zanaflex) [] Iovera []    []   Transcranial Magnetic stimulation []     Review of Systems:  Review of Systems   Eyes:  Positive for photophobia. Negative for blurred  vision and double vision.   Gastrointestinal:  Positive for nausea. Negative for vomiting.   Musculoskeletal:  Negative for falls.   Neurological:  Positive for headaches. Negative for dizziness, tingling, sensory change and weakness.   All other systems reviewed and are negative.     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     Generalized anxiety disorder 05/26/2023    Chronic right shoulder pain 06/01/2023    Neck pain on right side 06/01/2023    Impacted cerumen of left ear 06/17/2023    Seasonal allergic rhinitis due to pollen 03/01/2024    Subacromial impingement, right 08/08/2024     Resolved Ambulatory Problems     Diagnosis Date Noted    No Resolved Ambulatory Problems     No Additional Past Medical History       Allergies:  Review of patient's allergies indicates:  No Known Allergies    Pertinent Family History:  Family History   Problem Relation Name Age of Onset    Drug abuse Mother      COPD Mother      Arthritis Mother      Alcohol abuse Mother      Drug abuse Father      Alcohol abuse Father      Heart attack Father          in his 60's    Coronary artery disease Father      Arthritis Sister half     Heart failure Maternal Grandmother      Diabetes Maternal Grandmother      Diverticulitis Maternal Grandmother      Kidney failure Maternal Grandfather      Diabetes Maternal Grandfather      Heart attack Maternal Grandfather      Colon cancer Neg Hx      Breast cancer Neg Hx      Ovarian cancer Neg Hx      Stroke Neg Hx      Osteoporosis Neg Hx         Pertinent Social History:  Social History     Tobacco Use    Smoking status: Former     Current packs/day: 0.00     Average packs/day: 1 pack/day for 5.0 years (5.0 ttl pk-yrs)     Types: Cigarettes     Start date: 1/1/2012     Quit date: 1/1/2017     Years  since quittin.0     Passive exposure: Never    Smokeless tobacco: Never   Substance Use Topics    Alcohol use: Not Currently    Drug use: Never       Medications:  Current Outpatient Medications   Medication Instructions    b complex vitamins capsule 1 capsule, Daily    estradioL (VIVELLE-DOT) 0.025 mg/24 hr Apply patch BIW (ie Monday and Thursday)    naratriptan (AMERGE) 2.5 MG tablet 2.5 mg at onset of headache, may repeat in 4 hours if needed. Maximum: 5 mg per 24 hours    progesterone (PROMETRIUM) 200 mg, Oral, Nightly, 30-60 minutes before bed    sertraline (ZOLOFT) 100 mg, Oral, Daily    UNABLE TO FIND medication name: Testosterone 2% Cream Apply to inner thigh daily    valACYclovir (VALTREX) 500 mg, 2 times daily    vitamin D (VITAMIN D3) 1,000 Units, Daily        Objective:     *exam is limited due to the virtual nature of this visit    General:  Well-appearing, well-nourished, NAD, cooperative    Neurologic Exam:   Awake, alert and oriented x3  Speech spontaneous and fluent, intact comprehension.   Adequate fund of knowledge, vocabulary.  EOM intact. No ophthalmoplegia.   Facial expression is full and symmetric.   Hearing is intact.  Antigravity in BUE. No tremor.      Pertinent lab results  Lab Results   Component Value Date    DEPGXYFO37 1227 (H) 2024    EZQETVGE85UL 30 2024     Lab Results   Component Value Date    TSH 1.055 2024    WBC 7.74 2024    LYMPH 1.3 2024    LYMPH 17.1 (L) 2024    RBC 4.40 2024    HGB 13.3 2024    HCT 41.3 2024    MCV 94 2024     2024     2024    K 4.2 2024    CO2 28 2024    BUN 9 2024    CREATININE 0.8 2024    CALCIUM 9.5 2024    AST 13 2024    ALT 15 2024       Pertinent imaging results  *Images personally reviewed and interpreted:  2/10/2021  MRI Brain wo contrast:  No acute intracranial pathologies    MRI Cervical Spine wo contrast:  Mild  degenerative changes, disc bulge at C3-C4 with slight indentation of ventral cord.    Other pertinent studies  None    Assessment:   Liza Byrne is a 54 y.o. RH female with depression, insomnia, and menstrual migraines who presents with worsening of migraines (increased frequency and duration). Starting estradiol may be contributing but headaches were worsening even prior to that. Considering that her migraines are associated with aura I recommend against any estrogen based treatments due to the increased risk of stroke. We will add NSAIDs and anti-emetic to naratriptan to enhance the analgesic effects. I will also submit for Ubrelvy since she has had partial response to 2 different triptans. She will let me know if the headaches continue to increase in frequency in that case we will repeat MRI of brain to exclude intracranial pathologies.     1. Intractable menstrual migraine without status migrainosus    2. Worsening headaches    3. Medication management      Plan:     - metoclopramide HCl (REGLAN) 10 MG tablet; Take 1 tablet (10 mg total) by mouth 3 (three) times daily as needed (nausea of migraines).  Dispense: 10 tablet; Refill: 5  - naratriptan (AMERGE) 2.5 MG tablet; 2.5 mg at onset of headache, may repeat in 4 hours if needed. Maximum: 5 mg per 24 hours  Dispense: 8 tablet; Refill: 5  - naproxen (NAPROSYN) 500 MG tablet; Take 1 tablet (500 mg total) by mouth 2 (two) times daily as needed (migraines).  Dispense: 10 tablet; Refill: 5  - ubrogepant (UBRELVY) 100 mg tablet; Take 1 tablet (100 mg total) by mouth as needed for Migraine. If symptoms persist or return, may repeat dose after 2 hours. Maximum: 200 mg per 24 hours  Dispense: 10 tablet; Refill: 11        Disclaimer: This note was partly generated using dictation software which may occasionally result in transcription errors that are missed on review.      Based on our encounter today, my overall Medical Decision Making is a Level 4     Complexity of  Problem: Moderate (1 or more chronic illnesses with exacerbation, progression or treatment side effects)  Complexity of Data: Extensive (Review of >3 unique test results and Independent interpretation of tests)  Risk of Complications and/or morbidity/mortality of Management: Moderate risk (Prescription drug management)        Zoila Vuong MD    [facility]  01/30/2025

## 2025-02-06 DIAGNOSIS — N95.1 PERIMENOPAUSAL SYMPTOMS: ICD-10-CM

## 2025-02-06 RX ORDER — PROGESTERONE 100 MG/1
200 CAPSULE ORAL NIGHTLY
Qty: 30 CAPSULE | Refills: 11 | Status: SHIPPED | OUTPATIENT
Start: 2025-02-06 | End: 2026-02-06

## 2025-02-25 ENCOUNTER — CLINICAL SUPPORT (OUTPATIENT)
Dept: REHABILITATION | Facility: OTHER | Age: 55
End: 2025-02-25
Attending: ORTHOPAEDIC SURGERY
Payer: COMMERCIAL

## 2025-02-25 DIAGNOSIS — R29.898 DECREASED STRENGTH OF UPPER EXTREMITY: Primary | ICD-10-CM

## 2025-02-25 DIAGNOSIS — M75.41 SUBACROMIAL IMPINGEMENT, RIGHT: ICD-10-CM

## 2025-02-25 PROCEDURE — 97140 MANUAL THERAPY 1/> REGIONS: CPT

## 2025-02-25 PROCEDURE — 97110 THERAPEUTIC EXERCISES: CPT

## 2025-02-25 PROCEDURE — 97161 PT EVAL LOW COMPLEX 20 MIN: CPT

## 2025-02-25 NOTE — PROGRESS NOTES
Outpatient Rehab    Physical Therapy Evaluation    Patient Name: Liza Byrne  MRN: [id number]  YOB: 1970  Encounter Date: 2/25/2025    Therapy Diagnosis:   Encounter Diagnoses   Name Primary?    Subacromial impingement, right     Decreased strength of upper extremity Yes     Physician: Josiah Butler MD    Physician Orders: Eval and Treat  Medical Diagnosis: M75.41 (ICD-10-CM) - Subacromial impingement, right     Visit # / Visits Authorized:  1 / 1   Date of Evaluation:  2/25/2025   Insurance Authorization Period: 12/6/24 to 12/6/25  Plan of Care Certification:  2/25/2025 to 5/6/25      Time In: 3:15 pm    Time Out: 4:00 pm   Total Time: 45 minutes    Total Billable Time: 45 minutes    Intake Outcome Measure for FOTO Survey    Therapist reviewed FOTO scores for Liza Byrne on 2/25/2025.   FOTO report - see Media section or FOTO account episode details.     Intake Score:  63 %    Precautions     Standard: treating as R rotator cuff tendinopathy and radial nerve irritation      Subjective   History of Present Illness  Liza is a 54 y.o. female who reports to physical therapy with a chief concern of R shoulder pain.         Diagnostic tests related to this condition: MRI studies.   MRI Studies Details: Impression:     Bursal surface crossover zone (posterior margin supraspinatus anterior margin infraspinatus) with myotendinous strain manifest by fluid/edema and adjacent mild fatty infiltration of the supraspinatus.  No evidence for full-thickness tear with retraction.     Subacromial subdeltoid bursitis.    Dominant Hand: Right  History of Present Condition/Illness: Patient reports that she has increase in R shoulder pain with lifting her R arm and doing behind the back movements and a lot of difficulty with sleeping. Feels better with her R arm above her head. She got a steroid injection recently and has had some relief for that- the most recent was a couple of months ago. She has had one  before on the same side in October. No reports of numbness and tingling. Her pain level started in the summer without any injury. She does see a neurologist for migraines and between cervical spine 3-4 she has had some compression. She does have a history of some neck issues, but has not really had any PT for it. Taking some hormone replacement, but has not changed her migraines or headaches. She thinks it was possibly it started while reaching behind her in the car and has also started having some R elbow issues as well.     Pain     Patient reports a current pain level of 0/10. Pain at best is reported as 0/10. Pain at worst is reported as 6/10.   Location: R shoulderand R elbow  Clinical Progression (since onset): Worsening  Pain Qualities: Sharp, Tightness, Aching  Pain-Relieving Factors: Activity modification, Other (Comment), Ice  Other Pain-Relieving Factors: steroid shot  Aggravating: using R UE, sleeping, and reaching behind her back.        Past Medical History/Physical Systems Review:   Liza Byrne  has no past medical history on file.    Liza Byrne  has a past surgical history that includes Knee arthroscopy w/ meniscectomy (Right, 2022); Knee arthroscopy w/ ACL reconstruction (Right, 09/15/2022); Excision of medial meniscus of knee (09/15/2022); Chondroplasty of knee (09/15/2022); Augmentation of breast (); Mammo Breast Stereotactic Biopsy Right;  section (10/17/2017); Breast surgery (); and Cosmetic surgery ().    Liza has a current medication list which includes the following prescription(s): b complex vitamins, estradiol, metoclopramide hcl, naproxen, naratriptan, progesterone, sertraline, ubrelvy, UNABLE TO FIND, valacyclovir, and vitamin d, and the following Facility-Administered Medications: lidocaine hcl 10 mg/ml (1%) and triamcinolone acetonide.    Review of patient's allergies indicates:  No Known Allergies     Objective      Posture Alignment: forward  head;increased kyphosis    Sensation: Light touch: intact to light touch    Cervical Active Range of Motion:    Degrees   Flexion WNL   Extension WNL   Right Rotation WNL   Left Rotation WNL   Right Side Bending WNL   Left Side Bending WNL   *denotes pain    Cervical Special Tests:  Compression: negative  Spurling's:  negative  ULTT:  positive: radial nerve on R side    Passive Range of Motion (degrees):   Shoulder Right Left   Flexion 180 180   Abduction 180 180   ER  90 90   IR  85 85      Active Range of Motion in (degrees):   Shoulder Right Left   Flexion 180*- painful arc 180   Abduction 175*- painful arc 180   ER 90 90   IR  85 85     Upper Extremity Strength  (R) UE  (L) UE    Elbow flexion: 4+/5 Elbow flexion: 4+/5   Elbow extension: 4+/5 Elbow extension: 4+/5   Shoulder flexion: 4-/5 Shoulder flexion: 4/5   Shoulder Abduction: 4-/5 Shoulder abduction: 4/5   Shoulder ER 4-/5 Shoulder ER 4-/5   Shoulder IR 4/5 Shoulder IR 4+/5   Lower Trap 3+/5 Lower Trap 3+/5   Middle Trap 4-/5 Middle Trap 4-/5   Rhomboids 4/5 Rhomboids 4/5     Special Tests:    Impingement   Right Left   Neer's  negative  negative   Reis-Stanislav  negative  negative     Rotator Cuff:     Right Left   Drop Arm Test  negative  negative   Subscapularis Lift Off Test  negative  negative   ER Lag Sign  negative  negative   IR Lag Sign  negative  negative   Empty Can Test  negative negative   Full Can Test negative negative   Scapular Retraction Test negative negative     Instability:     Right Left   Sulcus Sign negative negative   Apprehension Test negative negative     Labrum:     Right Left   Largo's Test negative negative   Clunk Test negative negative       AC Joint/Biceps:     Right Left   AC Joint Compression Test negative negative   Speed's test negative negative     Joint Mobility: hypomobile to CTJ and thoracic spine    Palpation: TTP to posterior shoulder joint and around radial nerve path in proximal  arm    Treatment:  Therapeutic Exercise  Therapeutic Exercise Activity 1: HEP review of: no moneys, wall slide with lift off, prone T, radial nerve glides; UBE, possible FDN, prone Y, prone neck retraction, neck EX machine, sidelying ER, art pose, cat/cow, thoracic EX over bolster, standing ER/IR    Manual Therapy  Manual Therapy Activity 1: STM to R posterior deltoid and around proximal radial nerve path                             Assessment & Plan   Assessment  Liza presents with a condition of Low complexity.   Presentation of Symptoms: Stable  Will Comorbidities Impact Care: No       Functional Limitations: Activity tolerance, Bed mobility, Carrying objects, Completing self-care activities, Completing work/school activities, Disrupted sleep pattern, Pain when reaching, Pain with ADLs/IADLs, Painful locomotion/ambulation, Participating in leisure activities, Participating in sports, Performing household chores, Range of motion, Reaching  Impairments: Abnormal coordination, Abnormal muscle firing, Abnormal muscle tone, Abnormal or restricted range of motion, Activity intolerance, Impaired physical strength, Lack of appropriate home exercise program, Pain with functional activity    Prognosis: Excellent  Assessment Details: Patient's symptoms are consistent with rotator cuff tendinopathy along with radial nerve irritation causing symptoms.    Plan  From a physical therapy perspective, the patient would benefit from: Skilled Rehab Services    Planned therapy interventions include: Therapeutic exercise, Therapeutic activities, Neuromuscular re-education, and Manual therapy.    Planned modalities to include: Cryotherapy (cold pack), Electrical stimulation - attended, Electrical stimulation - passive/unattended, Thermotherapy (hot pack), and Other (Comment). Dry Needling.      Visit Frequency: 2 times Per Week for 10 Weeks.  Other/tapered frequency details: 1-2 times a week    This plan was discussed with Patient.    Discussion participants: Agreed Upon Plan of Care             Patient's spiritual, cultural, and educational needs considered and patient agreeable to plan of care and goals.           Goals:   Active       LTG       Patient will improve pain/adverse symptoms to 3/10 or less with all activity.       Start:  02/27/25    Expected End:  05/08/25            Patient will improve UE/scapular strength by 1 grade.       Start:  02/27/25    Expected End:  05/08/25               STG       Patient will improve R shoulder % without any increase in adverse symptoms while performing.       Start:  02/27/25    Expected End:  04/03/25            Patient will improve UE/scapular strength by 1/2 grade.       Start:  02/27/25    Expected End:  04/03/25                Michael Oakley PT

## 2025-02-26 PROBLEM — M25.511 CHRONIC RIGHT SHOULDER PAIN: Status: RESOLVED | Noted: 2023-06-01 | Resolved: 2025-02-26

## 2025-02-26 PROBLEM — R29.898 DECREASED STRENGTH OF UPPER EXTREMITY: Status: ACTIVE | Noted: 2025-02-26

## 2025-02-26 PROBLEM — G89.29 CHRONIC RIGHT SHOULDER PAIN: Status: RESOLVED | Noted: 2023-06-01 | Resolved: 2025-02-26

## 2025-02-26 PROBLEM — M54.2 NECK PAIN ON RIGHT SIDE: Status: RESOLVED | Noted: 2023-06-01 | Resolved: 2025-02-26

## 2025-03-11 ENCOUNTER — PATIENT MESSAGE (OUTPATIENT)
Dept: INTERNAL MEDICINE | Facility: CLINIC | Age: 55
End: 2025-03-11
Payer: COMMERCIAL

## 2025-03-11 DIAGNOSIS — Z86.19 HISTORY OF COLD SORES: Primary | ICD-10-CM

## 2025-03-12 ENCOUNTER — CLINICAL SUPPORT (OUTPATIENT)
Dept: REHABILITATION | Facility: OTHER | Age: 55
End: 2025-03-12
Payer: COMMERCIAL

## 2025-03-12 DIAGNOSIS — R29.898 DECREASED STRENGTH OF UPPER EXTREMITY: Primary | ICD-10-CM

## 2025-03-12 PROCEDURE — 97110 THERAPEUTIC EXERCISES: CPT

## 2025-03-12 RX ORDER — VALACYCLOVIR HYDROCHLORIDE 500 MG/1
500 TABLET, FILM COATED ORAL 2 TIMES DAILY
Qty: 60 TABLET | Refills: 0 | Status: SHIPPED | OUTPATIENT
Start: 2025-03-12

## 2025-03-12 NOTE — PROGRESS NOTES
"  Outpatient Rehab    Physical Therapy Visit    Patient Name: Liza Byrne  MRN: [id number]  YOB: 1970  Encounter Date: 3/12/2025    Therapy Diagnosis:   Encounter Diagnosis   Name Primary?    Decreased strength of upper extremity Yes     Physician: Josiah Butler MD    Physician Orders: Eval and Treat  Medical Diagnosis: M75.41 (ICD-10-CM) - Subacromial impingement, right      Visit # / Visits Authorized:  2/20  Date of Evaluation:  2/25/2025   Insurance Authorization Period: 12/6/24 to 12/6/25  Plan of Care Certification:  2/25/2025 to 5/6/25      Time In: 1640 (late arrival)  Time Out: 1730  Total Time: 50   Total Billable Time: 50    FOTO:  Intake Score:  %  Survey Score 1:  %  Survey Score 2:  %         Subjective   Patient presents to therapy with increased R shoulder pain which she believes is d/t steroid injection wearing off. She has not been compliant with HEP d/t being busy with carnival season. Patient notices increased pain and tenderness along R lateral elbow..         Objective            Treatment:  Therapeutic Exercise  Therapeutic Exercise Activity 1: NV: possible FDN, standing ER/IR, RTB, 2x10x3 sec, neck EX machine, sidelying ER, thoracic EX over bolster  Therapeutic Exercise Activity 2: no money, RTB, 2x10x3 sec  Therapeutic Exercise Activity 3: wall slides c/ lift off, 2x10x3 sec  Therapeutic Exercise Activity 4: prone T, +Y, 2x10x3 sec hold  Therapeutic Exercise Activity 5: radial nerve glides, x20  Therapeutic Exercise Activity 6: +R forearm extensors stretch, x2x30"  Therapeutic Exercise Activity 7: +UBE, 5'  Therapeutic Exercise Activity 8: +cat/cow, x15x5"  Therapeutic Exercise Activity 9: +prone neck retraction, 2x10x3"  Therapeutic Exercise Activity 10: +child's pose, x2x30"              Modalities  Moist Heat (min): 5    Assessment & Plan   Assessment: Pt presents to therapy with R shoulder and lateral R elbow pain. Modified activity log with addiitonal thoracic, " peripscapular training and shoulder strengthening exercises with good patient feedback. Educated patient on importance of HEP compliance and performing exercises wtihin pain free ROM to avoid further exacerbation of symptoms and assist with progression towards therapy goals. Patient performed exercises with slight discomfort/pain. Issued/reviewed updated HEP with patient understanding. Will continue to monitor and progress exercises as tolerated by pt to further address impairments and improve overall functional mobility.  Evaluation/Treatment Tolerance: Patient limited by pain    Patient will continue to benefit from skilled outpatient physical therapy to address the deficits listed in the problem list box on initial evaluation, provide pt/family education and to maximize pt's level of independence in the home and community environment.     Patient's spiritual, cultural, and educational needs considered and patient agreeable to plan of care and goals.           Plan:      Goals:   Active       LTG       Patient will improve pain/adverse symptoms to 3/10 or less with all activity.       Start:  02/27/25    Expected End:  05/08/25            Patient will improve UE/scapular strength by 1 grade.       Start:  02/27/25    Expected End:  05/08/25               STG       Patient will improve R shoulder % without any increase in adverse symptoms while performing.       Start:  02/27/25    Expected End:  04/03/25            Patient will improve UE/scapular strength by 1/2 grade.       Start:  02/27/25    Expected End:  04/03/25                Annie Jansen, PT

## 2025-03-12 NOTE — TELEPHONE ENCOUNTER
No care due was identified.  [facility] Embedded Care Due Messages. Reference number: [id number].   3/12/2025 8:42:36 AM CDT

## 2025-03-27 ENCOUNTER — PATIENT MESSAGE (OUTPATIENT)
Dept: REHABILITATION | Facility: OTHER | Age: 55
End: 2025-03-27
Payer: COMMERCIAL

## 2025-03-31 DIAGNOSIS — N95.1 PERIMENOPAUSAL SYMPTOMS: ICD-10-CM

## 2025-03-31 RX ORDER — PROGESTERONE 100 MG/1
200 CAPSULE ORAL NIGHTLY
Qty: 30 CAPSULE | Refills: 11 | Status: SHIPPED | OUTPATIENT
Start: 2025-03-31 | End: 2026-03-31

## 2025-04-09 ENCOUNTER — NURSE TRIAGE (OUTPATIENT)
Dept: ADMINISTRATIVE | Facility: CLINIC | Age: 55
End: 2025-04-09
Payer: COMMERCIAL

## 2025-04-09 ENCOUNTER — RESULTS FOLLOW-UP (OUTPATIENT)
Dept: INTERNAL MEDICINE | Facility: CLINIC | Age: 55
End: 2025-04-09
Payer: COMMERCIAL

## 2025-04-09 ENCOUNTER — OFFICE VISIT (OUTPATIENT)
Dept: INTERNAL MEDICINE | Facility: CLINIC | Age: 55
End: 2025-04-09
Payer: COMMERCIAL

## 2025-04-09 ENCOUNTER — HOSPITAL ENCOUNTER (OUTPATIENT)
Dept: RADIOLOGY | Facility: OTHER | Age: 55
Discharge: HOME | End: 2025-04-09
Attending: COUNSELOR
Payer: COMMERCIAL

## 2025-04-09 VITALS
WEIGHT: 150.81 LBS | OXYGEN SATURATION: 98 % | SYSTOLIC BLOOD PRESSURE: 124 MMHG | DIASTOLIC BLOOD PRESSURE: 60 MMHG | HEART RATE: 75 BPM | BODY MASS INDEX: 21.59 KG/M2 | HEIGHT: 70 IN

## 2025-04-09 DIAGNOSIS — M79.605 ACUTE LEG PAIN, LEFT: ICD-10-CM

## 2025-04-09 DIAGNOSIS — R60.0 EDEMA OF LEFT LOWER EXTREMITY: Primary | ICD-10-CM

## 2025-04-09 DIAGNOSIS — R05.1 ACUTE COUGH: ICD-10-CM

## 2025-04-09 DIAGNOSIS — G43.009 MIGRAINE WITHOUT AURA AND WITHOUT STATUS MIGRAINOSUS, NOT INTRACTABLE: ICD-10-CM

## 2025-04-09 DIAGNOSIS — M71.22 POPLITEAL CYST, LEFT: Primary | ICD-10-CM

## 2025-04-09 DIAGNOSIS — R60.0 EDEMA OF LEFT LOWER EXTREMITY: ICD-10-CM

## 2025-04-09 LAB
CTP QC/QA: YES
CTP QC/QA: YES
POC MOLECULAR INFLUENZA A AGN: NEGATIVE
POC MOLECULAR INFLUENZA B AGN: NEGATIVE
SARS-COV-2 RDRP RESP QL NAA+PROBE: NEGATIVE

## 2025-04-09 PROCEDURE — 99215 OFFICE O/P EST HI 40 MIN: CPT | Mod: S$GLB,,, | Performed by: COUNSELOR

## 2025-04-09 PROCEDURE — 87635 SARS-COV-2 COVID-19 AMP PRB: CPT | Mod: QW,S$GLB,, | Performed by: COUNSELOR

## 2025-04-09 PROCEDURE — 3008F BODY MASS INDEX DOCD: CPT | Mod: CPTII,S$GLB,, | Performed by: COUNSELOR

## 2025-04-09 PROCEDURE — 1159F MED LIST DOCD IN RCRD: CPT | Mod: CPTII,S$GLB,, | Performed by: COUNSELOR

## 2025-04-09 PROCEDURE — 87502 INFLUENZA DNA AMP PROBE: CPT | Mod: QW,S$GLB,, | Performed by: COUNSELOR

## 2025-04-09 PROCEDURE — 3078F DIAST BP <80 MM HG: CPT | Mod: CPTII,S$GLB,, | Performed by: COUNSELOR

## 2025-04-09 PROCEDURE — 93971 EXTREMITY STUDY: CPT | Mod: TC,LT

## 2025-04-09 PROCEDURE — 99999 PR PBB SHADOW E&M-EST. PATIENT-LVL IV: CPT | Mod: PBBFAC,,, | Performed by: COUNSELOR

## 2025-04-09 PROCEDURE — 93971 EXTREMITY STUDY: CPT | Mod: 26,LT,, | Performed by: RADIOLOGY

## 2025-04-09 PROCEDURE — 3074F SYST BP LT 130 MM HG: CPT | Mod: CPTII,S$GLB,, | Performed by: COUNSELOR

## 2025-04-09 NOTE — PROGRESS NOTES
Subjective:       Patient ID: Liza Byrne is a 54 y.o. female with history of GERD, allergic rhinitis, medial and lateral meniscus tear of the right knee.    Chief Complaint: Edema of left lower extremity [R60.0]    Patient is new to me, PCP is Dr. Yelitza Garvin. 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 The pain is described as deep and localized to the back of her leg. It radiates to the hip upward and is more generalized. It also radiates down to the calf and is tender to touch there. She experiences numbness and cramping in the affected leg. She has also had some swelling of the left leg compared to right that does not improve with elevation. Ice provides temporary relief, but pain persists. She denies any recent injury or fall. She also reports cough and congestion that started yesterday with sore throat and mild sinus pressure. She is developing a migraine today. Denies muscle aches, fevers, shortness of breath, chest pains.    Recently seen on 03/28/2025 at the emergency department for rash of both lower extremities.  She was cleaning out her house while an  was spring for possible fleas.  She noticed some pruritus and then red and slightly swollen legs afterwards.  Determined to be contact dermatitis.  Was given prednisone 40 mg for up to 10 days. The rash is still present but pruritus has improved significantly.    MEDICAL HISTORY:  She has a history of right knee lateral and meniscal tear from sports activities.    MEDICATIONS:  She is currently on hormone replacement therapy including Vivelledot patch for estrogen (Estradiol), Prometrium (progesterone), and compounded testosterone. She has previously tried three different types of Triptans for migraine  management before settling on her current one.    SOCIAL HISTORY:  She is a former smoker with intermittent smoking history, starting at age 42 and stopping at age 47, then resuming at age 52. She smoked one pack per day during active periods and last cigarette one month ago. She denies current alcohol use.    ROS:  General: -fever, -chills, -fatigue, -weight gain, -weight loss  Eyes: -vision changes, -redness, -discharge  ENT: -ear pain, -nasal congestion, +sore throat, +sinus pressure, +runny nose, +nasal discharge  Cardiovascular: -chest pain, -palpitations, -lower extremity edema  Respiratory: +cough, -shortness of breath  Gastrointestinal: -abdominal pain, -nausea, -vomiting, -diarrhea, -constipation, -blood in stool, +loss of appetite  Genitourinary: -dysuria, -hematuria, -frequency  Musculoskeletal: +joint pain, -muscle pain, +limb pain, +limb swelling, +muscle cramps, +leg cramping  Skin: -rash, -lesion  Neurological: -headache, -dizziness, +numbness, +tingling, +migraines  Psychiatric: -anxiety, -depression, -sleep difficulty          Current Outpatient Medications   Medication Instructions    b complex vitamins capsule 1 capsule, Daily    estradioL (VIVELLE-DOT) 0.025 mg/24 hr Apply patch BIW (ie Monday and Thursday)    metoclopramide HCl (REGLAN) 10 mg, Oral, 3 times daily PRN    naproxen (NAPROSYN) 500 mg, Oral, 2 times daily PRN    naratriptan (AMERGE) 2.5 MG tablet 2.5 mg at onset of headache, may repeat in 4 hours if needed. Maximum: 5 mg per 24 hours    progesterone (PROMETRIUM) 200 mg, Oral, Nightly, 30-60 minutes before bed    sertraline (ZOLOFT) 100 mg, Oral, Daily    UBRELVY 100 mg, Oral, As needed (PRN), If symptoms persist or return, may repeat dose after 2 hours. Maximum: 200 mg per 24 hours    UNABLE TO FIND medication name: Testosterone 2% Cream Apply to inner thigh daily    valACYclovir (VALTREX) 500 mg, Oral, 2 times daily    vitamin D (VITAMIN D3) 1,000 Units, Daily     Objective:     "  Vitals:    04/09/25 1102   BP: 124/60   Pulse: 75   SpO2: 98%   Weight: 68.4 kg (150 lb 12.7 oz)   Height: 5' 10" (1.778 m)   PainSc:   8   PainLoc: Knee     Body mass index is 21.64 kg/m².    Physical Exam  Constitutional:       General: She is not in acute distress.     Appearance: Normal appearance. She is normal weight. She is not ill-appearing, toxic-appearing or diaphoretic.   HENT:      Head: Normocephalic and atraumatic.      Right Ear: Tympanic membrane, ear canal and external ear normal. There is no impacted cerumen.      Left Ear: Tympanic membrane, ear canal and external ear normal. There is no impacted cerumen.      Nose: Rhinorrhea present.      Mouth/Throat:      Mouth: Mucous membranes are moist.      Pharynx: Oropharynx is clear. No oropharyngeal exudate or posterior oropharyngeal erythema.   Eyes:      General:         Right eye: No discharge.         Left eye: No discharge.      Extraocular Movements: Extraocular movements intact.      Conjunctiva/sclera: Conjunctivae normal.   Cardiovascular:      Rate and Rhythm: Normal rate and regular rhythm.      Pulses: Normal pulses.      Heart sounds: Normal heart sounds. No murmur heard.     No friction rub. No gallop.   Pulmonary:      Effort: Pulmonary effort is normal. No respiratory distress.      Breath sounds: Normal breath sounds. No stridor. No wheezing, rhonchi or rales.   Musculoskeletal:         General: Swelling and tenderness present. No deformity or signs of injury. Normal range of motion.      Cervical back: No rigidity or tenderness.      Right lower leg: No edema.      Left lower leg: Edema present.      Comments: No mass or significant tenderness to deep palpation of popliteal fossa. Popliteal pulses palpable.  Left leg with slight non pitting edema compared to right.   Tenderness to palpation of left calf.   Dorsalis pedis and posterior tibialis pulses intact, strong.  Some pain in calf with flexion of left foot.  Strength 5/5 and " sensations intact, but patient reports numbness and tingling to left foot.   Lymphadenopathy:      Cervical: No cervical adenopathy.   Skin:     General: Skin is warm and dry.      Findings: Rash present.      Comments: Erythematous rash on lateral aspect of shin. Nonpruritic. No excoriations or other lesions of concern.   Neurological:      General: No focal deficit present.      Mental Status: She is alert and oriented to person, place, and time. Mental status is at baseline.      Sensory: No sensory deficit.      Motor: No weakness.      Gait: Gait normal.   Psychiatric:         Mood and Affect: Mood normal.         Behavior: Behavior normal.         Thought Content: Thought content normal.         Judgment: Judgment normal.         Assessment:       1. Edema of left lower extremity    2. Acute cough    3. Acute leg pain, left    4. Migraine without aura and without status migrainosus, not intractable        IMPRESSION:  - Concerned about possible DVT due to leg pain, swelling, and numbness/tingling.  - Considered risk factors: recent estrogen patch use, history of smoking.  - Assessed for signs of pulmonary embolism; lungs sound clear.  - Evaluated for possible Baker's cyst; not clinically evident.  - Suspect viral infection causing cough and congestion.  - Discussed case with Dr. Garvin, who agreed with US order.    Plan:     PLAN SUMMARY:  - STAT ultrasound of affected leg to rule out DVT  - Flu and COVID-19 swab tests  - Consider referral to Dr. Butler (orthopedist) pending ultrasound results  - Follow-up as needed for additional labs depending on ultrasound findings    Edema of left lower extremity  Acute leg pain, left  - Evaluated the patient's sudden onset of deep pain in the back of the leg, which started 3-4 days ago, with swelling and pain moving downwards.  - Patient also reported numbness, tingling, and cramps in the affected leg.  - Performed physical exam revealing tenderness in the middle of  the calf, pain with flexion, and visible swelling in the affected leg compared to the contralateral side.  - Noted good pulses and strength.  - Suspected possible DVT based on clinical presentation.  - Ordered STAT ultrasound of the affected leg to rule out DVT, using sound waves to detect blood flow and potential clots.  - Educated the patient about typical symptoms of DVT and the importance of prompt evaluation.  - Consider referral to Dr. Butler (orthopedist) pending ultrasound results.  - Scheduled follow-up as needed for additional labs depending on ultrasound findings.  -     US Lower Extremity Veins Left; Future; Expected date: 04/09/2025    Acute cough  - Evaluated the patient's reported symptoms of cough, congestion, and sore throat that started yesterday.  - Performed physical exam revealing small, non-inflamed tonsils and clear lung sounds with no abnormalities noted.  - Assessed the condition as likely a viral infection.  - Ordered flu and COVID-19 swab tests.  - Offered medication to potentially reduce the duration of symptoms, which the patient declined.  -     POCT COVID-19 Rapid Screening  -     POCT Influenza A/B Molecular    Migraine without aura and without status migrainosus, not intractable  - Acknowledged the patient's history of migraines and current onset of typical migraine symptoms.  - Advised the patient to attempt to abort the migraine at home using non-pharmacological methods.    PERSONAL HISTORY OF NICOTINE DEPENDENCE:  - Recorded the patient's history of smoking, including periods of heavy smoking (1 pack per day) and recent intermittent smoking.    HORMONE REPLACEMENT THERAPY:  - Confirmed the patient's current use of hormone replacement therapy, including estradiol patch, progesterone, and testosterone.  - Discussed the relationship between estrogen use and increased clotting risk.         This note was generated with the assistance of ambient listening technology. Verbal consent  was obtained by the patient and accompanying visitor(s) for the recording of patient appointment to facilitate this note. I attest to having reviewed and edited the generated note for accuracy, though some syntax or spelling errors may persist. Please contact the author of this note for any clarification.    Terry Wan PA-C    4/9/2025

## 2025-04-09 NOTE — TELEPHONE ENCOUNTER
Patient has pain behind her left knee for a few days. There is swelling and the pain radiates down the calf. Patient denies injury. Disposition provided - go to office or video visit now. Appointment scheduled within time frame. Instructed to call back with additional questions or worsening of symptoms. Patient verbalized understanding.     Reason for Disposition   Redness and painful when touched and no fever    Additional Information   Negative: Knee pain and fever   Negative: Knee redness and fever   Negative: Patient sounds very sick or weak to the triager   Negative: SEVERE pain (e.g., excruciating, unable to walk) and not improved after 2 hours of pain medicine    Protocols used: Knee Swelling-A-OH

## 2025-04-14 DIAGNOSIS — M25.562 LEFT KNEE PAIN, UNSPECIFIED CHRONICITY: Primary | ICD-10-CM

## 2025-04-16 ENCOUNTER — HOSPITAL ENCOUNTER (OUTPATIENT)
Facility: HOSPITAL | Age: 55
Discharge: HOME | End: 2025-04-16
Attending: ORTHOPAEDIC SURGERY
Payer: COMMERCIAL

## 2025-04-16 ENCOUNTER — OFFICE VISIT (OUTPATIENT)
Dept: ORTHOPEDICS | Facility: CLINIC | Age: 55
End: 2025-04-16
Payer: COMMERCIAL

## 2025-04-16 VITALS — WEIGHT: 148.81 LBS | BODY MASS INDEX: 21.35 KG/M2

## 2025-04-16 DIAGNOSIS — M77.11 LATERAL EPICONDYLITIS OF RIGHT ELBOW: Primary | ICD-10-CM

## 2025-04-16 DIAGNOSIS — M71.22 POPLITEAL CYST, LEFT: ICD-10-CM

## 2025-04-16 DIAGNOSIS — M25.562 LEFT KNEE PAIN, UNSPECIFIED CHRONICITY: ICD-10-CM

## 2025-04-16 PROCEDURE — 73564 X-RAY EXAM KNEE 4 OR MORE: CPT | Mod: TC,PN,LT

## 2025-04-16 PROCEDURE — 3008F BODY MASS INDEX DOCD: CPT | Mod: CPTII,S$GLB,, | Performed by: ORTHOPAEDIC SURGERY

## 2025-04-16 PROCEDURE — 99214 OFFICE O/P EST MOD 30 MIN: CPT | Mod: S$GLB,,, | Performed by: ORTHOPAEDIC SURGERY

## 2025-04-16 PROCEDURE — 1160F RVW MEDS BY RX/DR IN RCRD: CPT | Mod: CPTII,S$GLB,, | Performed by: ORTHOPAEDIC SURGERY

## 2025-04-16 PROCEDURE — 73564 X-RAY EXAM KNEE 4 OR MORE: CPT | Mod: 26,LT,, | Performed by: RADIOLOGY

## 2025-04-16 PROCEDURE — 1159F MED LIST DOCD IN RCRD: CPT | Mod: CPTII,S$GLB,, | Performed by: ORTHOPAEDIC SURGERY

## 2025-04-16 PROCEDURE — 99999 PR PBB SHADOW E&M-EST. PATIENT-LVL III: CPT | Mod: PBBFAC,,, | Performed by: ORTHOPAEDIC SURGERY

## 2025-04-16 NOTE — PROGRESS NOTES
Patient ID:   Liza Byrne is a 54 y.o. female.    Chief Complaint:   Left knee pain    HPI:   54-year-old female presenting for left leg pain and swelling.  She says that about a week ago she noticed more swelling on her lower leg.  She has some pain in the back of the leg as well in the calf.  She had an ultrasound that showed a popliteal cyst.  She denies any trauma.  She denies any shooting pains from her back or buttock pain.      She also mentions pain over the lateral aspect of the forearm the started about 3-4 days ago.  She says she is barely able to pick things up now because of this.  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    Medications:  Current Medications[1]    Allergies:  Review of patient's allergies indicates:  No Known Allergies    Past Medical History:  History reviewed. No pertinent past medical history.     Past Surgical History:  Past Surgical History:   Procedure Laterality Date    AUGMENTATION OF BREAST  2004    BREAST SURGERY       SECTION  10/17/2017    CHONDROPLASTY OF KNEE  09/15/2022    Procedure: CHONDROPLASTY, KNEE;  Surgeon: Josiah Butler MD;  Location: [facility] OR;  Service: Orthopedics;;    COSMETIC SURGERY      EXCISION OF MEDIAL MENISCUS OF KNEE  09/15/2022    Procedure: MENISCECTOMY, KNEE, MEDIAL;  Surgeon: Josiah Butler MD;  Location: [facility] OR;  Service: Orthopedics;;    KNEE ARTHROSCOPY W/ ACL RECONSTRUCTION Right 09/15/2022    Procedure: RECONSTRUCTION, KNEE, ACL, ARTHROSCOPIC;  Surgeon: Josiah Butler MD;  Location: [facility] OR;  Service: Orthopedics;  Laterality: Right;  [facility] ACL and meniscal repair instruments, BTB allograft in freezer, interference screws, large frag set with washers  Linvate confirmed Cw     KNEE ARTHROSCOPY W/ MENISCECTOMY Right 2022    Procedure: Right knee arthroscopy, partial meniscectomy, bone grafting of old ACL tunnels;  Surgeon: Josiah Butler MD;  Location: [facility] OR;  Service: Orthopedics;   Laterality: Right;  Linvatec ACL instrumentation, Cloward bone graft dowels    MAMMO BREAST STEREOTACTIC BREAST BIOPSY RIGHT         Social History:  Social History     Occupational History    Not on file   Tobacco Use    Smoking status: Former     Current packs/day: 0.00     Average packs/day: 1 pack/day for 5.0 years (5.0 ttl pk-yrs)     Types: Cigarettes     Start date: 2012     Quit date: 2017     Years since quittin.2     Passive exposure: Never    Smokeless tobacco: Never   Substance and Sexual Activity    Alcohol use: Not Currently    Drug use: Never    Sexual activity: Yes     Partners: Male     Birth control/protection: None       Family History:  Family History   Problem Relation Name Age of Onset    Drug abuse Mother      COPD Mother      Arthritis Mother      Alcohol abuse Mother      Drug abuse Father      Alcohol abuse Father      Heart attack Father          in his 60's    Coronary artery disease Father      Arthritis Sister half     Heart failure Maternal Grandmother      Diabetes Maternal Grandmother      Diverticulitis Maternal Grandmother      Kidney failure Maternal Grandfather      Diabetes Maternal Grandfather      Heart attack Maternal Grandfather      Colon cancer Neg Hx      Breast cancer Neg Hx      Ovarian cancer Neg Hx      Stroke Neg Hx      Osteoporosis Neg Hx          ROS:  Review of Systems   Musculoskeletal:  Positive for joint pain and myalgias.   All other systems reviewed and are negative.      Vitals:  Wt 67.5 kg (148 lb 13 oz)   LMP  (LMP Unknown)   BMI 21.35 kg/m²     Physical Examination:  Comprehensive Orthopaedic Musculoskeletal Exam    General      Constitutional: appears stated age, well-developed and well-nourished    Scleral icterus: no    Labored breathing: no    Psychiatric: normal mood and affect and no acute distress    Neurological: alert and oriented x3    Skin: intact    Lymphadenopathy: none     Ortho Exam   Right elbow exam: She has full range  motion of the elbow.  She is tender over the common extensor origin.  Increased pain with resisted wrist and finger extension consistent with tennis elbow.    Left knee exam:  No effusion.  The knee is stable to varus and valgus, Lachman's, posterior drawer.  Full range of motion.  Palpable Baker's cyst.    Imaging:  I have ordered and independently reviewed the following imaging studies performed at [facility] today    X-Ray Knee Complete 4 or More Views Left  Narrative: EXAMINATION:  XR KNEE COMP 4 OR MORE VIEWS LEFT    CLINICAL HISTORY:  Pain in left knee    FINDINGS:  Knee complete four views left.    There is a spur on the patella.  No fracture, dislocation, or bone destruction seen.  No acute trauma seen.  There is a small joint effusion.  No OCD seen.  Impression: No acute process seen.    Electronically signed by: Stevie Edwards MD  Date:    04/16/2025  Time:    14:37    Assessment:  1. Lateral epicondylitis of right elbow    2. Popliteal cyst, left      Plan:  I reviewed the clinical and radiographic findings with the patient.  Her left knee joint looks well-preserved.  She has a Baker's cyst.  She is also suffering from right tennis elbow.  We will provide her with a counterforce brace.  I have also suggested use of meloxicam.  If no improvement, she may be a candidate for rheumatology referral given her history of psoriasis.     No follow-ups on file.              [1]   Current Outpatient Medications:     estradioL (VIVELLE-DOT) 0.025 mg/24 hr, Apply patch BIW (ie Monday and Thursday), Disp: 8 patch, Rfl: 11    metoclopramide HCl (REGLAN) 10 MG tablet, Take 1 tablet (10 mg total) by mouth 3 (three) times daily as needed (nausea of migraines)., Disp: 10 tablet, Rfl: 5    naproxen (NAPROSYN) 500 MG tablet, Take 1 tablet (500 mg total) by mouth 2 (two) times daily as needed (migraines)., Disp: 10 tablet, Rfl: 5    progesterone (PROMETRIUM) 100 MG capsule, Take 2 capsules (200 mg total) by mouth every evening.  30-60 minutes before bed, Disp: 30 capsule, Rfl: 11    sertraline (ZOLOFT) 100 MG tablet, Take 1 tablet (100 mg total) by mouth once daily., Disp: 90 tablet, Rfl: 3    ubrogepant (UBRELVY) 100 mg tablet, Take 1 tablet (100 mg total) by mouth as needed for Migraine. If symptoms persist or return, may repeat dose after 2 hours. Maximum: 200 mg per 24 hours, Disp: 10 tablet, Rfl: 11    UNABLE TO FIND, medication name: Testosterone 2% Cream Apply to inner thigh daily, Disp: 30 g, Rfl: 5    valACYclovir (VALTREX) 500 MG tablet, Take 1 tablet (500 mg total) by mouth 2 (two) times daily., Disp: 60 tablet, Rfl: 0    b complex vitamins capsule, Take 1 capsule by mouth once daily., Disp: , Rfl:     naratriptan (AMERGE) 2.5 MG tablet, 2.5 mg at onset of headache, may repeat in 4 hours if needed. Maximum: 5 mg per 24 hours, Disp: 8 tablet, Rfl: 5    vitamin D (VITAMIN D3) 1000 units Tab, Take 1,000 Units by mouth once daily., Disp: , Rfl:   No current facility-administered medications for this visit.    Facility-Administered Medications Ordered in Other Visits:     LIDOcaine HCL 10 mg/ml (1%) injection 4 mL, 4 mL, , , Josiah Butler MD, 4 mL at 12/06/24 0900    triamcinolone acetonide injection 40 mg, 40 mg, Intra-articular, , Josiah Butler MD, 40 mg at 12/06/24 0900

## 2025-05-15 DIAGNOSIS — F41.1 GENERALIZED ANXIETY DISORDER: ICD-10-CM

## 2025-05-15 NOTE — TELEPHONE ENCOUNTER
Refill Encounter    PCP Visits: Recent Visits  Date Type Provider Dept   04/09/25 Office Visit Jose Guadalupe Wan PA-C [facility]   06/11/24 Office Visit Yelitza Garvin MD [facility]   Showing recent visits within past 360 days and meeting all other requirements  Future Appointments  No visits were found meeting these conditions.  Showing future appointments within next 720 days and meeting all other requirements      Last 3 Blood Pressure:   BP Readings from Last 3 Encounters:   04/09/25 124/60   12/17/24 105/69   09/24/24 114/76     Preferred Pharmacy:   [id number] - [place], [place] - [address] [place] [address]  Phone: [phone] Fax: [phone]    Requested RX:  Requested Prescriptions     Pending Prescriptions Disp Refills    sertraline (ZOLOFT) 100 MG tablet 90 tablet 3     Sig: Take 1 tablet (100 mg total) by mouth once daily.      RX Route: Normal

## 2025-05-15 NOTE — TELEPHONE ENCOUNTER
No care due was identified.  [facility] Embedded Care Due Messages. Reference number: [id number].   5/15/2025 10:33:27 AM CDT

## 2025-05-16 RX ORDER — SERTRALINE HYDROCHLORIDE 100 MG/1
100 TABLET, FILM COATED ORAL DAILY
Qty: 90 TABLET | Refills: 1 | Status: SHIPPED | OUTPATIENT
Start: 2025-05-16

## 2025-06-02 DIAGNOSIS — N95.1 PERIMENOPAUSAL SYMPTOMS: ICD-10-CM

## 2025-06-03 ENCOUNTER — TELEPHONE (OUTPATIENT)
Dept: OBSTETRICS AND GYNECOLOGY | Facility: CLINIC | Age: 55
End: 2025-06-03
Payer: COMMERCIAL

## 2025-06-04 ENCOUNTER — TELEPHONE (OUTPATIENT)
Dept: OBSTETRICS AND GYNECOLOGY | Facility: CLINIC | Age: 55
End: 2025-06-04
Payer: COMMERCIAL

## 2025-06-04 DIAGNOSIS — N95.1 PERIMENOPAUSAL SYMPTOMS: ICD-10-CM

## 2025-06-11 ENCOUNTER — TELEPHONE (OUTPATIENT)
Dept: OBSTETRICS AND GYNECOLOGY | Facility: CLINIC | Age: 55
End: 2025-06-11
Payer: COMMERCIAL

## 2025-06-11 NOTE — TELEPHONE ENCOUNTER
----- Message from Dalia sent at 6/11/2025 11:45 AM CDT -----  Pt. Called about a medication and I didn't see it on her med list. She is out of town and would like someone to give her a call at  [phone]

## 2025-06-11 NOTE — TELEPHONE ENCOUNTER
Spoke with patient regarding Testosterone prescription. Patient is traveling out of town and requested for it to be called into [facility] pharmacy in [place]. Medication called in and patient notified.

## 2025-07-02 DIAGNOSIS — N95.1 PERIMENOPAUSAL SYMPTOMS: ICD-10-CM

## 2025-07-02 RX ORDER — PROGESTERONE 100 MG/1
200 CAPSULE ORAL NIGHTLY
Qty: 60 CAPSULE | Refills: 11 | Status: SHIPPED | OUTPATIENT
Start: 2025-07-02 | End: 2026-07-02

## 2025-07-02 NOTE — TELEPHONE ENCOUNTER
Copied from CRM #[id number]. Topic: Medications - Medication Refill  >> Jul 2, 2025  1:17 PM Joceline wrote:  Type: Patient Call Back    Who called: self     What is the request in detail: pt stated due to progesterone (PROMETRIUM) 100 MG capsule RX stating 2x a day and only given 30, she is running out sooner and her ins is charging her for an early refill. Pt is requesting the Rx be updated to reflect 60 a month 2x a day.    [id number] - [place], [place] - [address] [place] [address]  Phone: [phone] Fax: [phone]    Can the clinic reply by [facility]?   No     Would the patient rather a call back or a response via [facility]? Call back     Best call back number: [phone]

## 2025-07-31 DIAGNOSIS — N95.1 PERIMENOPAUSAL SYMPTOMS: ICD-10-CM

## 2025-07-31 NOTE — TELEPHONE ENCOUNTER
Copied from CRM #[id number]. Topic: Medications - Medication Refill  >> Jul 31, 2025 10:14 AM Jermain wrote:  .Type:  RX Refill Request    Who Called: Pt  Refill or New Rx:Refill  RX Name and Strength:Compound      Preferred Pharmacy with phone number:[facility] [address] [place] [address]  Local or Mail Order:[place]  Ordering Provider:[place]  Would the patient rather a call back or a response via [facility]? Call back  Best Call Back Number:[phone]   Additional Information: Pt states she will be in town on 8/12 and can be seen if you need to see her, states she really need her medication

## 2025-07-31 NOTE — TELEPHONE ENCOUNTER
Matt Ross,    Please reprint patient's testosterone prescription. She's traveling and needs a refill before appointment in August.     Thank you

## 2025-08-04 ENCOUNTER — TELEPHONE (OUTPATIENT)
Dept: OBSTETRICS AND GYNECOLOGY | Facility: CLINIC | Age: 55
End: 2025-08-04
Payer: COMMERCIAL

## 2025-08-04 NOTE — TELEPHONE ENCOUNTER
Copied from CRM #[id number]. Topic: General Inquiry - Patient Advice  >> Aug 4, 2025 10:41 AM Jose Luis wrote:  Type: Patient Call Back    Who called: Self     What is the request in detail: pt is following up on a request to get a prescription that was sent. Stated she has been waiting a week for it. Would like a call back.     Can the clinic reply by [facility]? No     Would the patient rather a call back or a response via [facility]? Call back     Best call back number: [phone]     Additional Information:    Thank you.

## 2025-08-12 ENCOUNTER — OFFICE VISIT (OUTPATIENT)
Dept: OBSTETRICS AND GYNECOLOGY | Facility: CLINIC | Age: 55
End: 2025-08-12
Payer: COMMERCIAL

## 2025-08-12 ENCOUNTER — LAB VISIT (OUTPATIENT)
Dept: LAB | Facility: OTHER | Age: 55
End: 2025-08-12
Attending: PHYSICIAN ASSISTANT
Payer: COMMERCIAL

## 2025-08-12 VITALS
SYSTOLIC BLOOD PRESSURE: 106 MMHG | DIASTOLIC BLOOD PRESSURE: 74 MMHG | BODY MASS INDEX: 22.76 KG/M2 | WEIGHT: 159 LBS | HEART RATE: 73 BPM | HEIGHT: 70 IN

## 2025-08-12 DIAGNOSIS — Z12.31 SCREENING MAMMOGRAM, ENCOUNTER FOR: ICD-10-CM

## 2025-08-12 DIAGNOSIS — N95.1 PERIMENOPAUSAL SYMPTOMS: Primary | ICD-10-CM

## 2025-08-12 DIAGNOSIS — N63.20 MASS OF LEFT BREAST, UNSPECIFIED QUADRANT: ICD-10-CM

## 2025-08-12 DIAGNOSIS — N95.1 PERIMENOPAUSAL SYMPTOMS: ICD-10-CM

## 2025-08-12 LAB
ESTRADIOL SERPL HS-MCNC: 83 PG/ML
FSH SERPL-ACNC: 19.01 MIU/ML
PROGEST SERPL-MCNC: 12.4 NG/ML
TESTOST SERPL-MCNC: 71 NG/DL (ref 5–73)

## 2025-08-12 PROCEDURE — 1160F RVW MEDS BY RX/DR IN RCRD: CPT | Mod: CPTII,S$GLB,, | Performed by: PHYSICIAN ASSISTANT

## 2025-08-12 PROCEDURE — 3078F DIAST BP <80 MM HG: CPT | Mod: CPTII,S$GLB,, | Performed by: PHYSICIAN ASSISTANT

## 2025-08-12 PROCEDURE — 84144 ASSAY OF PROGESTERONE: CPT

## 2025-08-12 PROCEDURE — 36415 COLL VENOUS BLD VENIPUNCTURE: CPT

## 2025-08-12 PROCEDURE — 1159F MED LIST DOCD IN RCRD: CPT | Mod: CPTII,S$GLB,, | Performed by: PHYSICIAN ASSISTANT

## 2025-08-12 PROCEDURE — 83001 ASSAY OF GONADOTROPIN (FSH): CPT

## 2025-08-12 PROCEDURE — 99213 OFFICE O/P EST LOW 20 MIN: CPT | Mod: S$GLB,,, | Performed by: PHYSICIAN ASSISTANT

## 2025-08-12 PROCEDURE — 99999 PR PBB SHADOW E&M-EST. PATIENT-LVL IV: CPT | Mod: PBBFAC,,, | Performed by: PHYSICIAN ASSISTANT

## 2025-08-12 PROCEDURE — 84403 ASSAY OF TOTAL TESTOSTERONE: CPT

## 2025-08-12 PROCEDURE — 3074F SYST BP LT 130 MM HG: CPT | Mod: CPTII,S$GLB,, | Performed by: PHYSICIAN ASSISTANT

## 2025-08-12 PROCEDURE — 3008F BODY MASS INDEX DOCD: CPT | Mod: CPTII,S$GLB,, | Performed by: PHYSICIAN ASSISTANT

## 2025-08-12 PROCEDURE — 82670 ASSAY OF TOTAL ESTRADIOL: CPT

## 2025-08-12 RX ORDER — RIZATRIPTAN BENZOATE 5 MG/1
TABLET, ORALLY DISINTEGRATING ORAL
COMMUNITY
Start: 2025-04-14

## 2025-08-13 ENCOUNTER — RESULTS FOLLOW-UP (OUTPATIENT)
Dept: OBSTETRICS AND GYNECOLOGY | Facility: CLINIC | Age: 55
End: 2025-08-13
Payer: COMMERCIAL

## 2025-08-13 DIAGNOSIS — N95.1 PERIMENOPAUSAL SYMPTOMS: ICD-10-CM

## 2025-08-23 DIAGNOSIS — F41.1 GENERALIZED ANXIETY DISORDER: ICD-10-CM

## 2025-08-23 DIAGNOSIS — Z86.19 HISTORY OF COLD SORES: ICD-10-CM

## 2025-08-23 DIAGNOSIS — G43.839 INTRACTABLE MENSTRUAL MIGRAINE WITHOUT STATUS MIGRAINOSUS: ICD-10-CM

## 2025-08-23 DIAGNOSIS — N95.1 PERIMENOPAUSAL SYMPTOMS: ICD-10-CM

## 2025-08-24 RX ORDER — SERTRALINE HYDROCHLORIDE 100 MG/1
100 TABLET, FILM COATED ORAL DAILY
Qty: 90 TABLET | Refills: 2 | Status: SHIPPED | OUTPATIENT
Start: 2025-08-24

## 2025-08-25 RX ORDER — PROGESTERONE 100 MG/1
200 CAPSULE ORAL NIGHTLY
Qty: 60 CAPSULE | Refills: 11 | Status: SHIPPED | OUTPATIENT
Start: 2025-08-25 | End: 2026-08-25

## 2025-08-25 RX ORDER — NARATRIPTAN 2.5 MG/1
TABLET ORAL
Qty: 12 TABLET | Refills: 11 | Status: SHIPPED | OUTPATIENT
Start: 2025-08-25 | End: 2025-09-22

## 2025-08-25 RX ORDER — METOCLOPRAMIDE 10 MG/1
10 TABLET ORAL 3 TIMES DAILY PRN
Qty: 15 TABLET | Refills: 11 | Status: SHIPPED | OUTPATIENT
Start: 2025-08-25

## 2025-08-25 RX ORDER — ESTRADIOL 0.03 MG/D
FILM, EXTENDED RELEASE TRANSDERMAL
Qty: 8 PATCH | Refills: 11 | Status: SHIPPED | OUTPATIENT
Start: 2025-08-25 | End: 2026-08-23

## 2025-08-25 RX ORDER — NAPROXEN 500 MG/1
500 TABLET ORAL 2 TIMES DAILY PRN
Qty: 15 TABLET | Refills: 5 | Status: SHIPPED | OUTPATIENT
Start: 2025-08-25

## 2025-08-26 RX ORDER — VALACYCLOVIR HYDROCHLORIDE 500 MG/1
500 TABLET, FILM COATED ORAL 2 TIMES DAILY
Qty: 60 TABLET | Refills: 0 | Status: SHIPPED | OUTPATIENT
Start: 2025-08-26

## 2025-08-29 ENCOUNTER — HOSPITAL ENCOUNTER (OUTPATIENT)
Dept: RADIOLOGY | Facility: OTHER | Age: 55
Discharge: HOME | End: 2025-08-29
Attending: PHYSICIAN ASSISTANT
Payer: COMMERCIAL

## (undated) DEVICE — GOWN POLY REINF BRTH SLV LG

## (undated) DEVICE — SEE L#120831

## (undated) DEVICE — Device

## (undated) DEVICE — GOWN POLY REINF BRTH SLV XL

## (undated) DEVICE — MAT SUCTION PUDDLEVAC ORANGE

## (undated) DEVICE — PROBE ARTHO ENERGY 90 DEG

## (undated) DEVICE — BLADE SHAVER 4.5 6/BX

## (undated) DEVICE — GAUZE SPONGE 4X4 12PLY

## (undated) DEVICE — BLADE SURG CARBON STEEL SZ11

## (undated) DEVICE — SHAVER ULTRAFFR 4.2MM

## (undated) DEVICE — SEE MEDLINE ITEM 156955

## (undated) DEVICE — TUBING SUC UNIV W/CONN 12FT

## (undated) DEVICE — COVER OVERHEAD SURG LT BLUE

## (undated) DEVICE — PACK BASIC

## (undated) DEVICE — CONSTANT DIAMETER REAMER

## (undated) DEVICE — SUT CTD VICRYL 2.0

## (undated) DEVICE — BNDG COFLEX FOAM LF2 ST 6X5YD

## (undated) DEVICE — PACK SURGERY START

## (undated) DEVICE — DRAPE STERI U-SHAPED 47X51IN

## (undated) DEVICE — CLOSURE SKIN STERI STRIP 1/2X4

## (undated) DEVICE — APPLICATOR CHLORAPREP ORN 26ML

## (undated) DEVICE — DRAPE THREE-QTR REINF 53X77IN

## (undated) DEVICE — DRAPE STERI INSTRUMENT 1018

## (undated) DEVICE — BANDAGE ESMARK ELASTIC ST 6X9

## (undated) DEVICE — SUT HSE FIBER 36IN MO6 NDL

## (undated) DEVICE — PADDING WYTEX UNDRCST 6INX4YD

## (undated) DEVICE — TOURNIQUET SB QC DP 44X4IN

## (undated) DEVICE — SPONGE LAP 18X18 PREWASHED

## (undated) DEVICE — NDL SPINAL 18GX3.5 SPINOCAN

## (undated) DEVICE — COVER BACK TBL HD 2-TIER 72IN

## (undated) DEVICE — PAD ABDOMINAL 5X9 STERILE

## (undated) DEVICE — MANIFOLD 4 PORT

## (undated) DEVICE — SUT VICRYL PLUS 0 CT1 18IN

## (undated) DEVICE — BLADE SHAVER LANZA 4.2X13CM

## (undated) DEVICE — REAMER GRAFTMAX FLEX 10MM

## (undated) DEVICE — SEE MEDLINE ITEM 157216

## (undated) DEVICE — GLOVE BIOGEL PI MICRO INDIC 8

## (undated) DEVICE — DRESSING XEROFORM FOIL PK 1X8

## (undated) DEVICE — GLOVE BIOGEL ORTHOPEDIC 8

## (undated) DEVICE — SUT ETHILON 3-0 BLK MONO FS

## (undated) DEVICE — TUBE SET INFLOW/OUTFLOW

## (undated) DEVICE — KIT ANATOMIC ACL DISPOSABLE

## (undated) DEVICE — DRAPE ARTHSCP T ORTHOMAX POUCH

## (undated) DEVICE — DRAPE PLASTIC U 60X72

## (undated) DEVICE — LOOP PASSING HI-FI

## (undated) DEVICE — COVER PROXIMA MAYO STAND

## (undated) DEVICE — SEE MEDLINE ITEM 157116

## (undated) DEVICE — SUT ETHILON 3/0 18IN PS-1

## (undated) DEVICE — BLADE SURG #15 CARBON STEEL

## (undated) DEVICE — BANDAGE MATRIX HK LOOP 6IN 5YD

## (undated) DEVICE — TOURNIQUET SB QC DP 34X4IN

## (undated) DEVICE — SEE MEDLINE ITEM 146292

## (undated) DEVICE — NDL 18GA X1 1/2 REG BEVEL

## (undated) DEVICE — BRACE KNEE T SCOPE PREMIER

## (undated) DEVICE — GOWN POLY REINF X-LONG 2XL

## (undated) DEVICE — ELECTRODE REM PLYHSV RETURN 9

## (undated) DEVICE — DRESSING AQUACEL SACRAL 9 X 9

## (undated) DEVICE — GOWN SURGICAL XX LARGE X LONG

## (undated) DEVICE — SUT 1 36IN PDS II

## (undated) DEVICE — SEE MEDLINE ITEM 157117

## (undated) DEVICE — TOWEL OR DISP STRL BLUE 4/PK

## (undated) DEVICE — DRAPE EMERALD 87X114.75X113

## (undated) DEVICE — DRAPE U SPLIT SHEET 54X76IN

## (undated) DEVICE — DRAPE C-ARM/MOBILE XRAY 44X80

## (undated) DEVICE — SOL IRR NACL .9% 3000ML

## (undated) DEVICE — BANDAGE ACE ELASTIC 6"

## (undated) DEVICE — PAD PREP 50/CA

## (undated) DEVICE — SEE MEDLINE ITEM 157131

## (undated) DEVICE — SPONGE GAUZE 16PLY 4X4